# Patient Record
Sex: FEMALE | Race: BLACK OR AFRICAN AMERICAN | NOT HISPANIC OR LATINO | Employment: FULL TIME | URBAN - METROPOLITAN AREA
[De-identification: names, ages, dates, MRNs, and addresses within clinical notes are randomized per-mention and may not be internally consistent; named-entity substitution may affect disease eponyms.]

---

## 2017-01-05 ENCOUNTER — OFFICE VISIT (OUTPATIENT)
Dept: SURGERY | Facility: CLINIC | Age: 32
End: 2017-01-05
Payer: COMMERCIAL

## 2017-01-05 VITALS
BODY MASS INDEX: 24.44 KG/M2 | TEMPERATURE: 99 F | WEIGHT: 165 LBS | HEIGHT: 69 IN | HEART RATE: 93 BPM | DIASTOLIC BLOOD PRESSURE: 79 MMHG | SYSTOLIC BLOOD PRESSURE: 127 MMHG

## 2017-01-05 DIAGNOSIS — K80.20 GALLSTONES: ICD-10-CM

## 2017-01-05 PROCEDURE — 99203 OFFICE O/P NEW LOW 30 MIN: CPT | Mod: S$GLB,,, | Performed by: SURGERY

## 2017-01-05 PROCEDURE — 99999 PR PBB SHADOW E&M-EST. PATIENT-LVL III: CPT | Mod: PBBFAC,,, | Performed by: SURGERY

## 2017-01-05 PROCEDURE — 1159F MED LIST DOCD IN RCRD: CPT | Mod: S$GLB,,, | Performed by: SURGERY

## 2017-01-05 NOTE — LETTER
January 5, 2017        Donna Akhtar MD  [address]  [facility] [address]             [facility] - General Surgery  [address]  [place] [address]  Phone: [phone] January 5, 2017      Donna Akhtar MD  [address]  [facility] [address]    Patient: Gautam Sotomayor   MR Number: [id number]   YOB: 1985   Date of Visit: 1/5/2017     Dear Dr. Akhtar:    Thank you for referring Gautam Sotomayor to me for evaluation. Below are the relevant portions of my assessment and plan of care.    Patient presents with biliary colic.     PLAN:  I think she should have her gallbladder removed.  Likely this could be laparoscopic, depending on adhesions.  Open in the navel and possibly RUQ.    If you have questions, please do not hesitate to call me. I look forward to following Gautam along with you.    Sincerely,    Pasha Nuno MD   Section Head - General, Laparoscopic, Bariatric  Acute Care and Oncologic Surgery   - Surgical Weight Loss Program  [facility]    WSGEO/fer

## 2017-01-05 NOTE — PROGRESS NOTES
History & Physical    SUBJECTIVE:     History of Present Illness:  Patient is a 31 y.o. female presents with gallstones.  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  Starting in 2009 she developed ruq pains but these have worsened over the last 5 years.  The pain is sharp but brief, non-radiating, with no aggravating or relieving factors.  She is not sure if food changes the pain but movement doesn't.      Chief Complaint   Patient presents with    Gall Bladder Problem       Review of patient's allergies indicates:   Allergen Reactions    Nickel Rash       Current Outpatient Prescriptions   Medication Sig Dispense Refill    fluticasone (FLONASE) 50 mcg/actuation nasal spray 2 sprays by Each Nare route once daily. 1 Bottle 4    folic acid (FOLVITE) 400 MCG tablet Take 400 mcg by mouth once daily.       No current facility-administered medications for this visit.        Past Medical History   Diagnosis Date    Allergy     Anemia     Sickle cell anemia      Trait     Past Surgical History   Procedure Laterality Date    Appendectomy      Splenectomy, total      Foot surgery Left     [place] tooth extraction       Family History   Problem Relation Age of Onset    Breast cancer Neg Hx     Colon cancer Neg Hx     Ovarian cancer Neg Hx      Social History   Substance Use Topics    Smoking status: Current Every Day Smoker     Packs/day: 0.25     Years: 10.00    Smokeless tobacco: None    Alcohol use 0.6 oz/week     1 Cans of beer per week      Comment: socially        Review of Systems:  Review of Systems   Constitutional: Negative for fever and unexpected weight change.   Respiratory: Negative for cough, chest tightness and shortness of breath.    Cardiovascular: Negative for chest pain.   Gastrointestinal: Negative for constipation, diarrhea, nausea and vomiting.   Genitourinary: Positive for frequency. Negative for difficulty urinating and dysuria.   Skin: Negative for rash and wound.   Neurological:  "Negative for seizures and headaches.   Hematological:        Has bruising but no easy bleeding       OBJECTIVE:     Vital Signs (Most Recent)  Temp: 98.5 °F (36.9 °C) (01/05/17 1346)  Pulse: 93 (01/05/17 1346)  BP: 127/79 (01/05/17 1346)  5' 9" (1.753 m)  74.8 kg (165 lb)     Physical Exam:  Physical Exam   Constitutional: She is oriented to person, place, and time. She appears well-developed and well-nourished.   Neck: Normal range of motion. Neck supple.   Cardiovascular: Normal rate, regular rhythm and normal heart sounds.    Pulmonary/Chest: Effort normal and breath sounds normal.   Abdominal: Soft. Bowel sounds are normal. There is no tenderness.       Neurological: She is alert and oriented to person, place, and time.   Skin: Skin is warm and dry.   Psychiatric: She has a normal mood and affect. Her behavior is normal. Judgment and thought content normal.   Vitals reviewed.      Laboratory  CBC: Reviewed  CMP: Reviewed    Diagnostic Results:  US: Reviewed    ASSESSMENT/PLAN:     Biliary colic    PLAN:Plan     I think she should have her gallbladder removed.  Likely this could be laparoscopic, depending on adhesions.  Open in the navel and possibly ruq.       "

## 2017-01-09 ENCOUNTER — TELEPHONE (OUTPATIENT)
Dept: SURGERY | Facility: CLINIC | Age: 32
End: 2017-01-09

## 2017-01-09 ENCOUNTER — CLINICAL SUPPORT (OUTPATIENT)
Dept: AUDIOLOGY | Facility: CLINIC | Age: 32
End: 2017-01-09
Payer: COMMERCIAL

## 2017-01-09 ENCOUNTER — OFFICE VISIT (OUTPATIENT)
Dept: OTOLARYNGOLOGY | Facility: CLINIC | Age: 32
End: 2017-01-09
Payer: COMMERCIAL

## 2017-01-09 VITALS — WEIGHT: 168.88 LBS | BODY MASS INDEX: 25.01 KG/M2 | HEIGHT: 69 IN

## 2017-01-09 DIAGNOSIS — H69.90 ETD (EUSTACHIAN TUBE DYSFUNCTION), UNSPECIFIED LATERALITY: ICD-10-CM

## 2017-01-09 DIAGNOSIS — R42 VERTIGO: Primary | ICD-10-CM

## 2017-01-09 DIAGNOSIS — H60.63 CHRONIC NON-INFECTIVE OTITIS EXTERNA OF BOTH EARS, UNSPECIFIED TYPE: ICD-10-CM

## 2017-01-09 DIAGNOSIS — H81.09 VERTIGO, LABYRINTHINE, UNSPECIFIED LATERALITY: ICD-10-CM

## 2017-01-09 DIAGNOSIS — H81.319 AURAL VERTIGO, UNSPECIFIED LATERALITY: Primary | ICD-10-CM

## 2017-01-09 DIAGNOSIS — J30.1 SEASONAL ALLERGIC RHINITIS DUE TO POLLEN: Primary | ICD-10-CM

## 2017-01-09 PROCEDURE — 99205 OFFICE O/P NEW HI 60 MIN: CPT | Mod: S$PBB,,, | Performed by: OTOLARYNGOLOGY

## 2017-01-09 PROCEDURE — 92537 CALORIC VSTBLR TEST W/REC: CPT | Mod: 51,S$GLB,, | Performed by: AUDIOLOGIST-HEARING AID FITTER

## 2017-01-09 PROCEDURE — 92557 COMPREHENSIVE HEARING TEST: CPT | Mod: S$GLB,,, | Performed by: AUDIOLOGIST

## 2017-01-09 PROCEDURE — 92540 BASIC VESTIBULAR EVALUATION: CPT | Mod: S$GLB,,, | Performed by: AUDIOLOGIST-HEARING AID FITTER

## 2017-01-09 PROCEDURE — 92567 TYMPANOMETRY: CPT | Mod: 51,S$GLB,, | Performed by: AUDIOLOGIST

## 2017-01-09 PROCEDURE — 99999 PR PBB SHADOW E&M-EST. PATIENT-LVL III: CPT | Mod: PBBFAC,,, | Performed by: OTOLARYNGOLOGY

## 2017-01-09 RX ORDER — BETAMETHASONE VALERATE 0.1 %
LOTION (ML) TOPICAL 2 TIMES DAILY
Qty: 60 ML | Refills: 2 | Status: SHIPPED | OUTPATIENT
Start: 2017-01-09 | End: 2017-11-16

## 2017-01-09 NOTE — PROGRESS NOTES
VNG/Postuography Evaluation    Referring physician:  Dr. Mckeon    31 y.o. female complains of vertigo (one episode that lasted 3 days), dizziness and occasional imbalance.  Symptoms are provoked by airising from bed and have been recurring over the past year and a half.    Gaze nystagmus was absent.    Oculomotor function was normal.    Spontaneous nystagmus was absent.    The head-hanging left Hallpike was negative.    The head-hanging right Hallpike was negative.    Static positional nystagmus was absent.    Unilateral weakness: 3% (right)  Directional preponderance 13% (right beating)    RC: 33 d/s   d/s  RW: 85 d/s  LW: 73 d/s    Fixation suppression was normal.    Impression: Normal VNG; no evidence of vestibular system dysfunction including BPPV    Recommendations: Trial period with Cawthorne exercises to reduce subjective symptoms

## 2017-01-09 NOTE — PROGRESS NOTES
Otolaryngology - Head and Neck Surgery  Consultation Report      Chief Complaint: dizziness, otalgia, itchy ears AU    History of Present Illness: 31 y.o. female presents for evaluation of dizziness, otalgia, and itchy ears AU. She reports that over the last year or so she has noticed increasing flaking and crusting AU as well as otalgia, AD > AS for years. She reports pain with flying. She also notes dizziness which is an off-balance/lightheaded sensation without triggers. She says it comes and goes, may last for seconds to minutes, and resolves spontaneously. No associated hearing loss or ear fullness. Her last episode was several months ago. She denies hearing loss overall.     She does have a history of sickle cell trait. No migraines or headaches. No prior ear surgery. She has allergies and takes flonase and zyrtec daily -- she has been told she has ETD by her PCP previously.     Review of Systems - Negative except as mentioned in HPI.   History obtained from chart review and the patient  General ROS: negative  Psychological ROS: negative  Ophthalmic ROS: negative  ENT ROS: positive for - nasal congestion, nasal discharge, tinnitus and vertigo  negative for - hearing change  Allergy and Immunology ROS: positive for allergies  Hematological and Lymphatic ROS: negative  Respiratory ROS: no cough, shortness of breath, or wheezing  Cardiovascular ROS: no chest pain or dyspnea on exertion  Gastrointestinal ROS: no abdominal pain, change in bowel habits, or black or bloody stools  Genito-Urinary ROS: no dysuria, trouble voiding, or hematuria  Musculoskeletal ROS: negative  Neurological ROS: no TIA or stroke symptoms  Dermatological ROS: negative    Past Medical History: she  has a past medical history of Allergy; Anemia; and Sickle cell anemia.    Past Surgical History: she  has a past surgical history that includes Appendectomy; Splenectomy, total; Foot surgery (Left); and [place] tooth extraction.    Social History:  she  reports that she has been smoking.  She has a 2.50 pack-year smoking history. She does not have any smokeless tobacco history on file. She reports that she drinks about 0.6 oz of alcohol per week  She reports that she does not use illicit drugs.    Family History: family history is negative for Breast cancer, Colon cancer, and Ovarian cancer.    Medications:     Allergies: she is allergic to nickel.    Physical Exam:  [unfilled]    General: nad, alert, oriented  Head: ncat  Eyes: eomi, perrl  Ears:   AU: dry, flaking skin with mild OE AU, TM intact, no NANCY  Nose: septum straight, congested mucosa  Mouth: adequate dentition, no lesions  Neck: soft, supple  Pulmonary: normal effort  Cardiovascular: RRR        VNG:   Gaze nystagmus was absent.  Oculomotor function was normal.  Spontaneous nystagmus was absent.  The head-hanging left Hallpike was negative.  The head-hanging right Hallpike was negative.  Static positional nystagmus was absent.  Unilateral weakness: 3% (right)  Directional preponderance 13% (right beating)     RC: 33 d/s   d/s  RW: 85 d/s  LW: 73 d/s     Fixation suppression was normal.     Impression: Normal VNG; no evidence of vestibular system dysfunction including BPPV    Assessment: 31F with mild OE, ETD. Hearing normal. VNG normal.     Plan:   - Continue allergy medication  - Clobetasol cream bid  - RTC PRN    Gautam was seen today for dizziness, otalgia, otitis media and itchy ears.    Diagnoses and all orders for this visit:    Seasonal allergic rhinitis due to pollen  -     Ambulatory referral to Audiology    ETD (eustachian tube dysfunction), unspecified laterality  -     Ambulatory referral to Audiology    Chronic non-infective otitis externa of both ears, unspecified type  -     Ambulatory referral to Audiology    Vertigo, labyrinthine, unspecified laterality    Other orders  -     betamethasone valerate 0.1% (VALISONE) 0.1 % Lotn; Apply topically 2 (two) times daily. qtts 3 AU  Bid.

## 2017-01-09 NOTE — MR AVS SNAPSHOT
[facility] - Otorhinolaryngology  [address]  [facility] [address]  Phone: [phone]  Fax: [phone]                  Gautam Sotomayor   2017 9:30 AM   Office Visit    Description:  Female : 1985   Provider:  Tom Mckeon MD   Department:  [facility] - Otorhinolaryngology           Reason for Visit     Dizziness     Otalgia     Otitis Media     itchy ears           Diagnoses this Visit        Comments    Seasonal allergic rhinitis due to pollen    -  Primary     ETD (eustachian tube dysfunction), unspecified laterality         Chronic non-infective otitis externa of both ears, unspecified type         Vertigo, labyrinthine, unspecified laterality                To Do List           Future Appointments        Provider Department Dept Phone    2017 3:30 PM Angela Sullivan MD [facility] - OB/GYN [address] [phone]      Goals (5 Years of Data)     None      Follow-Up and Disposition     Return if symptoms worsen or fail to improve.       These Medications        Disp Refills Start End    betamethasone valerate 0.1% (VALISONE) 0.1 % Lotn 60 mL 2 2017    Apply topically 2 (two) times daily. qtts 3 AU Bid. - Topical    Pharmacy: [facility]/pharm[facility] #[id number] - [place], [place] - [address]  #: [phone]         [facility]     [facility] Care [facility] -  Assistance  Registered nurses in the [facility] On Call Center provide clinical advisement, health education, appointment booking, and other advisory services.  Call for this free service at [phone].             Medications           Message regarding Medications     Verify the changes and/or additions to your medication regime listed below are the same as discussed with your clinician today.  If any of these changes or additions are incorrect, please notify your healthcare provider.        START taking these NEW medications        Refills    betamethasone valerate 0.1%  "(VALISONE) 0.1 % Lotn 2    Sig: Apply topically 2 (two) times daily. qtts 3 AU Bid.    Class: Normal    Route: Topical           Verify that the below list of medications is an accurate representation of the medications you are currently taking.  If none reported, the list may be blank. If incorrect, please contact your healthcare provider. Carry this list with you in case of emergency.           Current Medications     betamethasone valerate 0.1% (VALISONE) 0.1 % Lotn Apply topically 2 (two) times daily. qtts 3 AU Bid.    BIOTIN ORAL Take by mouth.    CETIRIZINE HCL (ZYRTEC ORAL) Take by mouth.    fluticasone (FLONASE) 50 mcg/actuation nasal spray 2 sprays by Each Nare route once daily.    folic acid (FOLVITE) 400 MCG tablet Take 400 mcg by mouth once daily.           Clinical Reference Information           Vital Signs - Last Recorded  Most recent update: 1/9/2017  9:43 AM by Chelita Davis MA    Ht Wt BMI          5' 9" (1.753 m) 76.6 kg (168 lb 14 oz) 24.94 kg/m2        Allergies as of 1/9/2017     Nickel      Immunizations Administered on Date of Encounter - 1/9/2017     None      Orders Placed During Today's Visit      Normal Orders This Visit    Ambulatory referral to Audiology       Smoking Cessation     If you would like to quit smoking:   You may be eligible for free services if you are a [place] resident and started smoking cigarettes before September 1, 1988.  Call the Smoking Cessation Trust (SCT) toll free at ([phone] or [phone].   Call 2-800-QUIT-NOW if you do not meet the above criteria.            "

## 2017-01-12 ENCOUNTER — OFFICE VISIT (OUTPATIENT)
Dept: OBSTETRICS AND GYNECOLOGY | Facility: CLINIC | Age: 32
End: 2017-01-12
Attending: OBSTETRICS & GYNECOLOGY
Payer: COMMERCIAL

## 2017-01-12 VITALS
SYSTOLIC BLOOD PRESSURE: 100 MMHG | DIASTOLIC BLOOD PRESSURE: 60 MMHG | WEIGHT: 167.75 LBS | BODY MASS INDEX: 24.85 KG/M2 | HEIGHT: 69 IN

## 2017-01-12 DIAGNOSIS — N83.202 LEFT OVARIAN CYST: Primary | ICD-10-CM

## 2017-01-12 PROCEDURE — 99213 OFFICE O/P EST LOW 20 MIN: CPT | Mod: S$GLB,,, | Performed by: OBSTETRICS & GYNECOLOGY

## 2017-01-12 PROCEDURE — 99999 PR PBB SHADOW E&M-EST. PATIENT-LVL II: CPT | Mod: PBBFAC,,, | Performed by: OBSTETRICS & GYNECOLOGY

## 2017-01-12 RX ORDER — TERCONAZOLE 8 MG/G
1 CREAM VAGINAL NIGHTLY
Qty: 20 G | Refills: 0 | Status: SHIPPED | OUTPATIENT
Start: 2017-01-12 | End: 2017-01-19

## 2017-01-12 RX ORDER — FLUCONAZOLE 150 MG/1
150 TABLET ORAL ONCE
Qty: 1 TABLET | Refills: 0 | Status: SHIPPED | OUTPATIENT
Start: 2017-01-12 | End: 2017-01-12

## 2017-01-12 NOTE — MR AVS SNAPSHOT
[facility] - OB/GYN [address]  [address] [facility] [address]  Phone: [phone]                  Gautam Sotomayor   2017 3:30 PM   Office Visit    Description:  Female : 1985   Provider:  Angela Sullivan MD   Department:  [facility] - OB/GYN [address]                To Do List           Future Appointments        Provider Department Dept Phone    2017 3:30 PM Angela Sullivan MD [facility] - OB/GYN [address] [phone]    2017 3:00 PM Eloisa Gutierrez MD [facility] General Surgery [phone]      Goals (5 Years of Data)     None      [facility]     [facility] On Call Nurse Care Line -  Assistance  Registered nurses in the [facility] On Call Center provide clinical advisement, health education, appointment booking, and other advisory services.  Call for this free service at [phone].             Medications           Message regarding Medications     Verify the changes and/or additions to your medication regime listed below are the same as discussed with your clinician today.  If any of these changes or additions are incorrect, please notify your healthcare provider.             Verify that the below list of medications is an accurate representation of the medications you are currently taking.  If none reported, the list may be blank. If incorrect, please contact your healthcare provider. Carry this list with you in case of emergency.           Current Medications     betamethasone valerate 0.1% (VALISONE) 0.1 % Lotn Apply topically 2 (two) times daily. qtts 3 AU Bid.    BIOTIN ORAL Take by mouth.    CETIRIZINE HCL (ZYRTEC ORAL) Take by mouth.    fluticasone (FLONASE) 50 mcg/actuation nasal spray 2 sprays by Each Nare route once daily.    folic acid (FOLVITE) 400 MCG tablet Take 400 mcg by mouth once daily.           Clinical Reference Information           Vital Signs - Last Recorded  Most recent update: 2017  3:21 PM by Li Zamudio MA    BP Ht Wt  "LMP BMI    100/60 (BP Location: Right arm, Patient Position: Sitting, BP Method: Manual) 5' 9" (1.753 m) 76.1 kg (167 lb 12.3 oz) 12/13/2016 24.78 kg/m2      Blood Pressure          Most Recent Value    BP  100/60      Allergies as of 1/12/2017     Nickel      Immunizations Administered on Date of Encounter - 1/12/2017     None      Smoking Cessation     If you would like to quit smoking:   You may be eligible for free services if you are a [place] resident and started smoking cigarettes before September 1, 1988.  Call the Smoking Cessation Trust (SCT) toll free at (957) 620-7702 or (188) 615-3070.   Call 0-792-QUIT-NOW if you do not meet the above criteria.            "

## 2017-01-13 ENCOUNTER — TELEPHONE (OUTPATIENT)
Dept: VASCULAR SURGERY | Facility: CLINIC | Age: 32
End: 2017-01-13

## 2017-01-13 NOTE — TELEPHONE ENCOUNTER
----- Message from Collin Brantley sent at 1/13/2017  8:53 AM CST -----  Patient states that she needs to speak with nurse in ref to rescheduling her appt on 01/16 to an earlier time if available;pt states that she will keep the original time if an earlier time isn't available//please call back at [phone]//thank you

## 2017-01-16 ENCOUNTER — INITIAL CONSULT (OUTPATIENT)
Dept: SURGERY | Facility: CLINIC | Age: 32
End: 2017-01-16
Attending: SURGERY
Payer: COMMERCIAL

## 2017-01-16 VITALS
DIASTOLIC BLOOD PRESSURE: 67 MMHG | BODY MASS INDEX: 24.46 KG/M2 | TEMPERATURE: 99 F | WEIGHT: 165.13 LBS | SYSTOLIC BLOOD PRESSURE: 105 MMHG | HEART RATE: 84 BPM | HEIGHT: 69 IN

## 2017-01-16 DIAGNOSIS — E04.1 THYROID NODULE: Primary | ICD-10-CM

## 2017-01-16 PROCEDURE — 99999 PR PBB SHADOW E&M-EST. PATIENT-LVL III: CPT | Mod: PBBFAC,,, | Performed by: SURGERY

## 2017-01-16 PROCEDURE — 99214 OFFICE O/P EST MOD 30 MIN: CPT | Mod: S$GLB,,, | Performed by: SURGERY

## 2017-01-16 NOTE — LETTER
Endocrine/General Surgery  [address] [place] [address]  Phone: [phone]  Fax: [phone] January 16, 2017         Donna Akhtar MD  [address]  [facility] [address]    Patient: Gautam Sotomayor   YOB: 1985   Date of Visit: 1/16/2017     Dear Dr. Akhtar:    I saw Ms. Sotomayor today in clinic. Attached you will find a copy of my note.    As you know, she is a 31 y.o. female who presented with thyromegaly in the setting of a small solitary nodule. I was able to discuss the natural history of thyroid nodules and the indications for intervention. The current plan includes repeat US in approximately 18 months.  Perhaps she could have this completed with you.    If you have any questions or concerns, please don't hesitate to contact my office. Again, thank you kindly for this referral.    Sincerely,    Eloisa Gutierrez MD

## 2017-01-16 NOTE — MR AVS SNAPSHOT
[facility] - Endo Surgery  [address], [address]  [facility] [address]  Phone: [phone]  Fax: [phone]                  Gautam Sotomayor   2017 8:30 AM   Initial consult    Description:  Female : 1985   Provider:  Eloisa Gutierrez MD   Department:  [facility] - Endo Surgery           Reason for Visit     Consult                To Do List           Goals (5 Years of Data)     None      [facility]     [facility] On Call Nurse [facility] Line -  Assistance  Registered nurses in the [facility] On Call Center provide clinical advisement, health education, appointment booking, and other advisory services.  Call for this free service at [phone].             Medications           Message regarding Medications     Verify the changes and/or additions to your medication regime listed below are the same as discussed with your clinician today.  If any of these changes or additions are incorrect, please notify your healthcare provider.             Verify that the below list of medications is an accurate representation of the medications you are currently taking.  If none reported, the list may be blank. If incorrect, please contact your healthcare provider. Carry this list with you in case of emergency.           Current Medications     betamethasone valerate 0.1% (VALISONE) 0.1 % Lotn Apply topically 2 (two) times daily. qtts 3 AU Bid.    BIOTIN ORAL Take by mouth.    CETIRIZINE HCL (ZYRTEC ORAL) Take by mouth.    fluticasone (FLONASE) 50 mcg/actuation nasal spray 2 sprays by Each Nare route once daily.    folic acid (FOLVITE) 400 MCG tablet Take 400 mcg by mouth once daily.    terconazole (TERAZOL 3) 0.8 % vaginal cream Place 1 applicator vaginally every evening.           Clinical Reference Information           Vital Signs - Last Recorded  Most recent update: 2017  8:26 AM by Rex Mckenzie MA    BP Pulse Temp Ht Wt LMP    105/67 (BP Location: Right arm, Patient Position:  "Sitting, BP Method: Automatic) 84 98.7 °F (37.1 °C) 5' 9" (1.753 m) 74.9 kg (165 lb 2 oz) 12/13/2016    BMI                24.38 kg/m2          Blood Pressure          Most Recent Value    BP  105/67      Allergies as of 1/16/2017     Nickel      Immunizations Administered on Date of Encounter - 1/16/2017     None      Smoking Cessation     If you would like to quit smoking:   You may be eligible for free services if you are a [place] resident and started smoking cigarettes before September 1, 1988.  Call the Smoking Cessation Trust (SCT) toll free at ([phone] or (977) 470-8476.   Call 7-182-QUIT-NOW if you do not meet the above criteria.            "

## 2017-01-16 NOTE — PATIENT INSTRUCTIONS
"The patient was given our "Understanding Thyroid Disease" booklet and directed to the [employer] patient education portal as a resource.  "

## 2017-01-16 NOTE — PROGRESS NOTES
Consult Note  Endocrine Surgery    Visit Diagnosis: Thyroid nodule [E04.1]    SUBJECTIVE:     Gautam Sotomayor is a 31 y.o. female seen at the request of Self Referral today in the Endocrine Surgery Clinic for evaluation of thyroid nodule(s). Her history dates back to several months when on routine physical exam thyroid enlargement was palpated. Subsequent evaluation included neck ultrasound. Gautam Sotomayor complains of no thyroid type symptoms currently aside for possible cold intolerance. The patient denies fatigue, unexplained weight changes, difficulty with swallowing, difficulty with shortness of breath especially with postural changes, change in voice quality, palpable neck mass and growth of thyroid nodule over time. She does not have a history of head and neck radiation therapy. She does have a family history of thyroid disease (maternal side). She does not have a family history of endocrinopathies. She is not taking thyroid hormone supplementation. She has not undergone radioactive iodine treatment.      Review of patient's allergies indicates:   Allergen Reactions    Nickel Rash       Current Outpatient Prescriptions   Medication Sig Dispense Refill    betamethasone valerate 0.1% (VALISONE) 0.1 % Lotn Apply topically 2 (two) times daily. qtts 3 AU Bid. 60 mL 2    BIOTIN ORAL Take by mouth.      CETIRIZINE HCL (ZYRTEC ORAL) Take by mouth.      fluticasone (FLONASE) 50 mcg/actuation nasal spray 2 sprays by Each Nare route once daily. 1 Bottle 4    folic acid (FOLVITE) 400 MCG tablet Take 400 mcg by mouth once daily.      terconazole (TERAZOL 3) 0.8 % vaginal cream Place 1 applicator vaginally every evening. 20 g 0     No current facility-administered medications for this visit.        Past Medical History   Diagnosis Date    Allergy     Anemia     Sickle cell anemia      Trait     Past Surgical History   Procedure Laterality Date    Appendectomy      Splenectomy, total      Foot  "surgery Left     [place] tooth extraction       Social History   Substance Use Topics    Smoking status: Current Every Day Smoker     Packs/day: 0.25     Years: 10.00    Smokeless tobacco: None    Alcohol use 0.6 oz/week     1 Cans of beer per week      Comment: socially          Review of Systems:    Review of Systems   Constitutional: negative for chills, fatigue, fevers, malaise and night sweats  Eyes: negative for icterus and irritation  Respiratory: negative for cough and dyspnea on exertion  Cardiovascular: negative for chest pain and palpitations  Gastrointestinal: negative for constipation, diarrhea and dysphagia  Genitourinary:negative for dysuria, hematuria and nocturia  Integument/breast: negative for rash and skin color change  Hematologic/lymphatic: negative for bleeding and easy bruising  Neurological: negative for gait problems, headaches and seizures  Behavioral/Psych: negative for anxiety and depression   ENT ROS: negative  Endocrine ROS: negative for - hair pattern changes, malaise/lethargy, mood swings, palpitations or polydipsia/polyuria      OBJECTIVE:     Vital Signs:  Visit Vitals    /67 (BP Location: Right arm, Patient Position: Sitting, BP Method: Automatic)    Pulse 84    Temp 98.7 °F (37.1 °C)    Ht 5' 9" (1.753 m)    Wt 74.9 kg (165 lb 2 oz)    LMP 12/13/2016    BMI 24.38 kg/m2      Body mass index is 24.38 kg/(m^2).      Physical Exam:    General:  no distress, see vitals for BMI    Eyes:  conjunctivae/corneas clear   Neck: trachea midline and symmetric, no adenopathy    Thyroid:  thyroid enlarged and thyroid no palpable nodule   Lung: clear to auscultation bilaterally   Heart:  regular rate and rhythm   Abdomen: soft, non-tender; bowel sounds normal; no masses,  no organomegaly   Skin/Extremities: warm and well-perfused   Pulses: 2+ and symmetric   Neuro: normal without focal findings and mental status, speech normal, alert and oriented x3       Laboratory/Radiologic " Studies    Studies:  Date:       Results:         Ultrasound:  10/15/2016 Sonographic evaluation of the thyroid gland was performed.  The thyroid gland appears enlarged with the right lobe of the thyroid gland measuring 6.1 x 2.0 x 1.9 cm in size and the left lobe of the thyroid gland measuring 6.7 x 2.2 x 2.5 cm.  The isthmus measures 0.4 cm in thickness.  There is a small left lobe thyroid nodule present within the mid left lobe measuring 0.2 x 0.4 x 0.5 cm in size.  The remainder of the thyroid gland is homogeneous in echotexture.                   Component Value Date   TSH 1.046 10/17/2016   Sodium 142 10/17/2016   Potassium 4.3 10/17/2016   Chloride 110 10/17/2016   CO2 24 10/17/2016   Glucose 90 10/17/2016   BUN, Bld 9 10/17/2016   Creatinine 0.8 10/17/2016   Calcium 9.5 10/17/2016   Anion Gap 8 10/17/2016   eGFR if African American >60.0 10/17/2016   eGFR if non African American >60.0 10/17/2016       ASSESSMENT/PLAN:     Assessment    Ms. Sotomayor is a 31 y.o. female who presents with evidence of a Thyroid nodule [E04.1].    Plan    During this visit, lab and imaging results were reviewed with the patient. Thyroid anatomy and physiology were reviewed and she was given a copy of our patient education booklet, Understanding Thyroid Disease. The above testing and examination support the diagnosis of enlarged thyroid with associated small thyroid nodule.    The risks and benefits of my recommendations, as well as other treatment options were discussed with the patient today. Patient's questions were answered.    No surgical intervention is required at this time.  I recommend that the patient  have a repeat US in 18-24 months.

## 2017-01-19 NOTE — PROGRESS NOTES
SUBJECTIVE:   31 y.o. female   for follow up of ovarian cyst. Patient's last menstrual period was 2016..  See for WWE  and reported RLQ.  Requested pelvic ultrasound to assess for fibroids.  Had initial scan  and had subsequent follow up  for left ovarian cyst 2 cm.  Has regular periods and uses condoms for contraception.      Pelvic ultrasound  showed:  Findings: The uterus measures 6.2 x 2.6 x 4.7 cm. Uterine parenchyma is homogeneous without evidence for masses. The endometrial echo complex is normal in thickness and measures 8 mm.    The right ovary is normal in size and measures 3.3 x 1.3 x 1.9 cm. The left ovary is normal in size and measures 4.0 x 1.8 x 3.6 cm. Follicles are seen in both ovaries.  Left ovarian cyst is visualized measuring 2.8 x 1.6 x 2.2 cm.  Small suspected hemorrhagic cyst visualized measuring 1.5 x 1.4 x 1.8 cm with surrounding hypervascularity.  Arterial and venous flow are preserved bilaterally with resistive indices of 0.48 on the right and 0.65 on the left. No free fluid is seen.   Impression     Left ovarian cyst and suspected small hemorrhagic cyst.  If patient is symptomatic, consider repeat pelvic ultrasound in 6 weeks to ensure resolution.         Pelvic ultrasound  showed  Findings: The uterus measures 6.3 cm x 3.5 cm x 3.9 cm. No uterine masses. The endometrium measures approximately 1 cm. The right ovary measures 2.2 cm x 2 cm x 1.1 cm.  The left ovary measures 2.2 cm x 2.1 cm x 3.8 cm.  A complex cystic structure measuring approximately 2.4 cm x 2.5 cm x 1.9 cm is identified on the left ovary.  Normal ovarian arterial and venous flow.  No adnexal abnormalities identified.   Impression    Complex cystic structure on the left ovary.  Although this is likely physiologic, it would not be unreasonable to perform an additional followup in 8-12 weeks to document resolution.           Past Medical History   Diagnosis Date    Allergy      Anemia     Sickle cell anemia      Trait     Past Surgical History   Procedure Laterality Date    Appendectomy      Splenectomy, total      Foot surgery Left     [place] tooth extraction       Social History     Social History    Marital status: Single     Spouse name: N/A    Number of children: N/A    Years of education: N/A     Occupational History    Not on file.     Social History Main Topics    Smoking status: Current Every Day Smoker     Packs/day: 0.25     Years: 10.00    Smokeless tobacco: Not on file    Alcohol use 0.6 oz/week     1 Cans of beer per week      Comment: socially    Drug use: No    Sexual activity: Yes     Partners: Male     Birth control/ protection: None, Condom     Other Topics Concern    Not on file     Social History Narrative     Family History   Problem Relation Age of Onset    Breast cancer Neg Hx     Colon cancer Neg Hx     Ovarian cancer Neg Hx      OB History    Para Term  AB SAB TAB Ectopic Multiple Living   0 0 0 0 0 0 0 0 0 0               Current Outpatient Prescriptions   Medication Sig Dispense Refill    betamethasone valerate 0.1% (VALISONE) 0.1 % Lotn Apply topically 2 (two) times daily. qtts 3 AU Bid. 60 mL 2    BIOTIN ORAL Take by mouth.      CETIRIZINE HCL (ZYRTEC ORAL) Take by mouth.      fluticasone (FLONASE) 50 mcg/actuation nasal spray 2 sprays by Each Nare route once daily. 1 Bottle 4    folic acid (FOLVITE) 400 MCG tablet Take 400 mcg by mouth once daily.      terconazole (TERAZOL 3) 0.8 % vaginal cream Place 1 applicator vaginally every evening. 20 g 0     No current facility-administered medications for this visit.      Allergies: Nickel     ROS:  Constitutional: no weight loss, weight gain, fever, fatigue  Eyes:  No vision changes, glasses/contacts  ENT/Mouth: No ulcers, sinus problems, ears ringing, headache  Cardiovascular: No inability to lie flat, chest pain, exercise intolerance, swelling, heart  "palpitations  Respiratory: No wheezing, coughing blood, shortness of breath, or cough  Gastrointestinal: No diarrhea, bloody stool, nausea/vomiting, constipation, gas, hemorrhoids  Genitourinary: No blood in urine, painful urination, urgency of urination, frequency of urination, incomplete emptying, incontinence, abnormal bleeding, painful periods, heavy periods, vaginal discharge, vaginal odor, painful intercourse, sexual problems, bleeding after intercourse.  Musculoskeletal: No muscle weakness  Skin/Breast: No painful breasts, nipple discharge, masses, rash, ulcers  Neurological: No passing out, seizures, numbness, headache  Endocrine: No diabetes, hypothyroid, hyperthyroid, hot flashes, hair loss, abnormal hair growth, ance  Psychiatric: No depression, crying  Hematologic: No bruises, bleeding, swollen lymph nodes, anemia.      OBJECTIVE:   The patient appears well, alert, oriented x 3, in no distress.  Visit Vitals    /60 (BP Location: Right arm, Patient Position: Sitting, BP Method: Manual)    Ht 5' 9" (1.753 m)    Wt 76.1 kg (167 lb 12.3 oz)    LMP 12/13/2016    BMI 24.78 kg/m2       ASSESSMENT:   1. Left ovarian cyst  US Pelvis Comp with Transvag NON-OB (xpd       PLAN:   Orders Placed This Encounter    US Pelvis Comp with Transvag NON-OB (xpd    terconazole (TERAZOL 3) 0.8 % vaginal cream    fluconazole (DIFLUCAN) 150 MG Tab     Discussed pelvic ultrasound and 2 cm left ovarian cyst.  Has some occasional RLQ pain, but not persistent.  Discussed cyst characteristics and low suspicion for malignancy.  Option of surgery and risks thereof.  I recommend following in 8-12 weeks.  Sooner if pain bothersome.  Return to clinic 12 weeks after next ultrasound    At the end of the visit, patient reported taking ABX for a sinus infection.  Now has vaginal irritation.  Requests meds for a yeast infection.  "

## 2017-02-22 ENCOUNTER — PATIENT MESSAGE (OUTPATIENT)
Dept: INTERNAL MEDICINE | Facility: CLINIC | Age: 32
End: 2017-02-22

## 2017-03-10 ENCOUNTER — HOSPITAL ENCOUNTER (EMERGENCY)
Facility: HOSPITAL | Age: 32
Discharge: HOME | End: 2017-03-10
Attending: EMERGENCY MEDICINE | Admitting: EMERGENCY MEDICINE
Payer: COMMERCIAL

## 2017-03-10 VITALS
WEIGHT: 165 LBS | RESPIRATION RATE: 16 BRPM | SYSTOLIC BLOOD PRESSURE: 101 MMHG | OXYGEN SATURATION: 100 % | HEIGHT: 69 IN | BODY MASS INDEX: 24.44 KG/M2 | HEART RATE: 101 BPM | TEMPERATURE: 99 F | DIASTOLIC BLOOD PRESSURE: 53 MMHG

## 2017-03-10 DIAGNOSIS — D57.00 SICKLE CELL PAIN CRISIS: ICD-10-CM

## 2017-03-10 DIAGNOSIS — A08.4 VIRAL GASTROENTERITIS: Primary | ICD-10-CM

## 2017-03-10 DIAGNOSIS — E86.0 DEHYDRATION: ICD-10-CM

## 2017-03-10 LAB
ALBUMIN SERPL BCP-MCNC: 4.7 G/DL
ALP SERPL-CCNC: 79 U/L
ALT SERPL W/O P-5'-P-CCNC: 16 U/L
ANION GAP SERPL CALC-SCNC: 13 MMOL/L
ANISOCYTOSIS BLD QL SMEAR: SLIGHT
AST SERPL-CCNC: 21 U/L
B-HCG UR QL: NEGATIVE
BASOPHILS # BLD AUTO: 0.02 K/UL
BASOPHILS NFR BLD: 0.1 %
BILIRUB SERPL-MCNC: 1.8 MG/DL
BILIRUB UR QL STRIP: NEGATIVE
BUN SERPL-MCNC: 13 MG/DL
CALCIUM SERPL-MCNC: 10.3 MG/DL
CHLORIDE SERPL-SCNC: 108 MMOL/L
CLARITY UR REFRACT.AUTO: CLEAR
CO2 SERPL-SCNC: 24 MMOL/L
COLOR UR AUTO: YELLOW
CREAT SERPL-MCNC: 0.8 MG/DL
CTP QC/QA: YES
DIFFERENTIAL METHOD: ABNORMAL
EOSINOPHIL # BLD AUTO: 0 K/UL
EOSINOPHIL NFR BLD: 0 %
ERYTHROCYTE [DISTWIDTH] IN BLOOD BY AUTOMATED COUNT: 14.9 %
EST. GFR  (AFRICAN AMERICAN): >60 ML/MIN/1.73 M^2
EST. GFR  (NON AFRICAN AMERICAN): >60 ML/MIN/1.73 M^2
GLUCOSE SERPL-MCNC: 105 MG/DL
GLUCOSE UR QL STRIP: NEGATIVE
HCT VFR BLD AUTO: 37.1 %
HGB BLD-MCNC: 13.5 G/DL
HGB UR QL STRIP: NEGATIVE
KETONES UR QL STRIP: ABNORMAL
LEUKOCYTE ESTERASE UR QL STRIP: NEGATIVE
LIPASE SERPL-CCNC: 4 U/L
LYMPHOCYTES # BLD AUTO: 0.8 K/UL
LYMPHOCYTES NFR BLD: 3.1 %
MCH RBC QN AUTO: 29.3 PG
MCHC RBC AUTO-ENTMCNC: 36.4 %
MCV RBC AUTO: 81 FL
MONOCYTES # BLD AUTO: 1.9 K/UL
MONOCYTES NFR BLD: 7.4 %
NEUTROPHILS # BLD AUTO: 22.5 K/UL
NEUTROPHILS NFR BLD: 89.4 %
NITRITE UR QL STRIP: NEGATIVE
PH UR STRIP: 7 [PH] (ref 5–8)
PLATELET # BLD AUTO: 319 K/UL
PMV BLD AUTO: 10.8 FL
POIKILOCYTOSIS BLD QL SMEAR: SLIGHT
POTASSIUM SERPL-SCNC: 4 MMOL/L
PROT SERPL-MCNC: 8.7 G/DL
PROT UR QL STRIP: NEGATIVE
RBC # BLD AUTO: 4.61 M/UL
RETICS/RBC NFR AUTO: 3.1 %
SODIUM SERPL-SCNC: 145 MMOL/L
SP GR UR STRIP: 1.01 (ref 1–1.03)
TARGETS BLD QL SMEAR: ABNORMAL
URN SPEC COLLECT METH UR: ABNORMAL
UROBILINOGEN UR STRIP-ACNC: NEGATIVE EU/DL
WBC # BLD AUTO: 25.27 K/UL

## 2017-03-10 PROCEDURE — 63600175 PHARM REV CODE 636 W HCPCS: Performed by: EMERGENCY MEDICINE

## 2017-03-10 PROCEDURE — 99284 EMERGENCY DEPT VISIT MOD MDM: CPT | Mod: 25

## 2017-03-10 PROCEDURE — 96375 TX/PRO/DX INJ NEW DRUG ADDON: CPT

## 2017-03-10 PROCEDURE — 96374 THER/PROPH/DIAG INJ IV PUSH: CPT

## 2017-03-10 PROCEDURE — 99285 EMERGENCY DEPT VISIT HI MDM: CPT | Mod: ,,, | Performed by: EMERGENCY MEDICINE

## 2017-03-10 PROCEDURE — 83690 ASSAY OF LIPASE: CPT

## 2017-03-10 PROCEDURE — 81025 URINE PREGNANCY TEST: CPT | Performed by: EMERGENCY MEDICINE

## 2017-03-10 PROCEDURE — 85025 COMPLETE CBC W/AUTO DIFF WBC: CPT

## 2017-03-10 PROCEDURE — 85045 AUTOMATED RETICULOCYTE COUNT: CPT

## 2017-03-10 PROCEDURE — 25500020 PHARM REV CODE 255: Performed by: EMERGENCY MEDICINE

## 2017-03-10 PROCEDURE — C9113 INJ PANTOPRAZOLE SODIUM, VIA: HCPCS | Performed by: EMERGENCY MEDICINE

## 2017-03-10 PROCEDURE — 25000003 PHARM REV CODE 250: Performed by: EMERGENCY MEDICINE

## 2017-03-10 PROCEDURE — 63600175 PHARM REV CODE 636 W HCPCS: Performed by: PHYSICIAN ASSISTANT

## 2017-03-10 PROCEDURE — 96361 HYDRATE IV INFUSION ADD-ON: CPT

## 2017-03-10 PROCEDURE — 81003 URINALYSIS AUTO W/O SCOPE: CPT

## 2017-03-10 PROCEDURE — 80053 COMPREHEN METABOLIC PANEL: CPT

## 2017-03-10 RX ORDER — DICYCLOMINE HYDROCHLORIDE 20 MG/1
20 TABLET ORAL 2 TIMES DAILY
Qty: 20 TABLET | Refills: 0 | Status: SHIPPED | OUTPATIENT
Start: 2017-03-10 | End: 2017-04-09

## 2017-03-10 RX ORDER — ONDANSETRON 4 MG/1
4 TABLET, FILM COATED ORAL EVERY 8 HOURS PRN
Qty: 12 TABLET | Refills: 0 | Status: SHIPPED | OUTPATIENT
Start: 2017-03-10 | End: 2017-09-18

## 2017-03-10 RX ORDER — PANTOPRAZOLE SODIUM 40 MG/10ML
40 INJECTION, POWDER, LYOPHILIZED, FOR SOLUTION INTRAVENOUS
Status: COMPLETED | OUTPATIENT
Start: 2017-03-10 | End: 2017-03-10

## 2017-03-10 RX ORDER — METOCLOPRAMIDE HYDROCHLORIDE 5 MG/ML
10 INJECTION INTRAMUSCULAR; INTRAVENOUS
Status: COMPLETED | OUTPATIENT
Start: 2017-03-10 | End: 2017-03-10

## 2017-03-10 RX ORDER — MORPHINE SULFATE 2 MG/ML
6 INJECTION, SOLUTION INTRAMUSCULAR; INTRAVENOUS
Status: COMPLETED | OUTPATIENT
Start: 2017-03-10 | End: 2017-03-10

## 2017-03-10 RX ORDER — NAPROXEN 500 MG/1
500 TABLET ORAL 2 TIMES DAILY WITH MEALS
Qty: 14 TABLET | Refills: 0 | Status: SHIPPED | OUTPATIENT
Start: 2017-03-10 | End: 2017-03-17

## 2017-03-10 RX ORDER — KETOROLAC TROMETHAMINE 30 MG/ML
15 INJECTION, SOLUTION INTRAMUSCULAR; INTRAVENOUS
Status: COMPLETED | OUTPATIENT
Start: 2017-03-10 | End: 2017-03-10

## 2017-03-10 RX ADMIN — METOCLOPRAMIDE 10 MG: 5 INJECTION, SOLUTION INTRAMUSCULAR; INTRAVENOUS at 11:03

## 2017-03-10 RX ADMIN — SODIUM CHLORIDE, SODIUM LACTATE, POTASSIUM CHLORIDE, AND CALCIUM CHLORIDE 1000 ML: .6; .31; .03; .02 INJECTION, SOLUTION INTRAVENOUS at 11:03

## 2017-03-10 RX ADMIN — SODIUM CHLORIDE, SODIUM LACTATE, POTASSIUM CHLORIDE, AND CALCIUM CHLORIDE 1000 ML: .6; .31; .03; .02 INJECTION, SOLUTION INTRAVENOUS at 01:03

## 2017-03-10 RX ADMIN — IOHEXOL 75 ML: 350 INJECTION, SOLUTION INTRAVENOUS at 12:03

## 2017-03-10 RX ADMIN — KETOROLAC TROMETHAMINE 15 MG: 30 INJECTION, SOLUTION INTRAMUSCULAR at 11:03

## 2017-03-10 RX ADMIN — PANTOPRAZOLE SODIUM 40 MG: 40 INJECTION, POWDER, FOR SOLUTION INTRAVENOUS at 11:03

## 2017-03-10 RX ADMIN — MORPHINE SULFATE 6 MG: 2 INJECTION, SOLUTION INTRAMUSCULAR; INTRAVENOUS at 11:03

## 2017-03-10 NOTE — ED PROVIDER NOTES
"Encounter Date: 3/10/2017       History     Chief Complaint   Patient presents with    Nausea     N/V/D. denies fever.      Review of patient's allergies indicates:   Allergen Reactions    Nickel Rash     HPI Comments: 31-year-old female with history of sickle cell trait, presents to the ER with chief complaint of bilateral leg pain, nausea, vomiting and diarrhea.  She reports vomiting and diarrhea every hour since 5 pm yesterday.  She describes loose brown stool and denies melena or bloody stool.  She reports left upper and left lower quadrant abdominal pain described as "turning and tightness" with intermittent cramping pain.  Her pain was rated 6/10 this morning but has improved since receiving medications in the ER.  She reports bilateral leg pain involving bilateral hips to her ankles, which began 5 hours prior to arrival.  She denies recent fall, trauma, or heavy lifting.  Patient has sickle cell trait and reports history of sickle cell pain crisis 5 years ago with similar joint pain.  She denies chest pain, shortness of breath, productive cough, dysuria, vaginal discharge, dizziness, near syncope.  The patient had cold symptoms one and a half weeks ago including nasal congestion, cough and body aches, but this has resolved.  She has not taken anything for her pain today.  She denies additional complaints at this time.      Past Medical History:   Diagnosis Date    Allergy     Anemia     Sickle cell anemia     Trait     Past Surgical History:   Procedure Laterality Date    APPENDECTOMY      FOOT SURGERY Left     SPLENECTOMY, TOTAL      WISDOM TOOTH EXTRACTION       Family History   Problem Relation Age of Onset    Breast cancer Neg Hx     Colon cancer Neg Hx     Ovarian cancer Neg Hx      Social History   Substance Use Topics    Smoking status: Current Every Day Smoker     Packs/day: 0.25     Years: 10.00    Smokeless tobacco: None    Alcohol use 0.6 oz/week     1 Cans of beer per week      " Comment: socially     Review of Systems   Constitutional: Negative for chills and fever.   HENT: Negative for sore throat.    Respiratory: Negative for shortness of breath.    Cardiovascular: Negative for chest pain.   Gastrointestinal: Positive for abdominal pain, diarrhea, nausea and vomiting.   Genitourinary: Negative for dysuria.   Musculoskeletal: Negative for back pain.   Skin: Negative for rash.   Neurological: Negative for dizziness, syncope, weakness and headaches.   Hematological: Does not bruise/bleed easily.   Psychiatric/Behavioral: Negative for confusion.       Physical Exam   Initial Vitals   BP Pulse Resp Temp SpO2   03/10/17 0904 03/10/17 0904 03/10/17 0904 03/10/17 0904 03/10/17 0904   113/55 116 16 98.9 °F (37.2 °C) 98 %     Physical Exam    Constitutional: She appears well-developed and well-nourished.   HENT:   Head: Atraumatic.   Eyes: Conjunctivae and EOM are normal. Pupils are equal, round, and reactive to light.   Neck: Normal range of motion. Neck supple.   Cardiovascular: Normal rate and regular rhythm.   Pulmonary/Chest: Breath sounds normal. No respiratory distress. She has no wheezes. She has no rhonchi. She has no rales.   Abdominal: Soft. Bowel sounds are normal. She exhibits no distension. There is no tenderness. There is no rebound and no guarding.   Neurological: She is alert and oriented to person, place, and time.   Skin: No rash noted.   Psychiatric: She has a normal mood and affect.         ED Course   Procedures  Labs Reviewed   CBC W/ AUTO DIFFERENTIAL - Abnormal; Notable for the following:        Result Value    WBC 25.27 (*)     MCV 81 (*)     MCHC 36.4 (*)     RDW 14.9 (*)     Gran # 22.5 (*)     Lymph # 0.8 (*)     Mono # 1.9 (*)     Gran% 89.4 (*)     Lymph% 3.1 (*)     All other components within normal limits   URINALYSIS - Abnormal; Notable for the following:     Ketones, UA 1+ (*)     All other components within normal limits    Narrative:     1 cup of urine    COMPREHENSIVE METABOLIC PANEL - Abnormal; Notable for the following:     Total Protein 8.7 (*)     Total Bilirubin 1.8 (*)     All other components within normal limits   RETICULOCYTES - Abnormal; Notable for the following:     Retic 3.1 (*)     All other components within normal limits   LIPASE   LIPASE   POCT URINE PREGNANCY                   APC / Resident Notes:   Patient presents for evaluation of nausea, vomiting and diarrhea ×2 days.  She reports bilateral hip, knee and ankle pain beginning today.    Patient has sickle cell trait, but does have history of sickle cell pain crisis.  Labs, antiemetics and pain medications were initially started in the intake area.  By the time I evaluated the patient she had received Toradol, Reglan, and fluids.  Her nausea and abdominal pain had improved but she reported continued severe bilateral leg pain.  She has no joint swelling, redness or recent fever and I do not suspect septic joint.  She is ambulating without difficulty.    DDX: Sickle cell pain crisis, viral gastroenteritis, diverticulitis, colitis, UTI, pyelonephritis, dehydration, electrolyte abnormalities.    Patient has elevated white blood cell count and reticulocyte count.  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  We will order CT of the abdomen for further evaluation of abdominal pain.      UA is negative for evidence of infection.  Chemistry shows no significant electrolyte abnormalities, or signs of severe dehydration.    CT is negative for acute process.      Her pain is significantly improved with morphine given in the ER.  She is given 2 liters IV fluids and is tolerating PO in the ER.  The patient is stable for discharge.  I will discharge home with prescription for Zofran and Bentyl to take as needed for symptomatic treatment of suspected viral gastroenteritis.  Her evaluation is also consistent with sickle cell pain crisis.  I'll instruct on close  follow-up with her PCP and hematologist within one week.  I'll prescribe NSAIDs as needed for pain control.  The patient is comfortable with this plan.    I discussed the care of this patient with my supervising MD.                ED Course     Clinical Impression:   The primary encounter diagnosis was Viral gastroenteritis. Diagnoses of Sickle cell pain crisis and Dehydration were also pertinent to this visit.          AYAN Kaye  03/10/17 [id number]

## 2017-03-10 NOTE — DISCHARGE INSTRUCTIONS
Return to the ER for any change or worsening of symptoms, including those listed below.        Viral Gastroenteritis (Adult)    Gastroenteritis is commonly called the stomach flu. It is most often caused by a virus that affects the stomach and intestinal tract and usually lasts from 2 to 7 days. Common viruses causing gastroenteritis include norovirus, rotavirus, and hepatitis A. Non-viral causes of gastroenteritis include bacteria, parasites, and toxins.  The danger from repeated vomiting or diarrhea is dehydration. This is the loss of too much fluid from the body. When this occurs, body fluids must be replaced. Antibiotics do not help with this illness because it is usually viral.Simple home treatment will be helpful.  Symptoms of viral gastroenteritis may include:  · Watery, loose stools  · Stomach pain or abdominal cramps  · Fever and chills  · Nausea and vomiting  · Loss of bowel control  · Headache  Home care  Gastroenteritis is transmitted by contact with the stool or vomit of an infected person. This can occur from person to person or from contact with a contaminated surface.  Follow these guidelines when caring for yourself at home:  · If symptoms are severe, rest at home for the next 24 hours or until you are feeling better.  · Wash your hands with soap and water or use alcohol-based  to prevent the spread of infection. Wash your hands after touching anyone who is sick.  · Wash your hands or use alcohol-based  after using the toilet and before meals. Clean the toilet after each use.  Remember these tips when preparing food:  · People with diarrhea should not prepare or serve food to others. When preparing foods, wash your hands before and after.  · Wash your hands after using cutting boards, countertops, knives, or utensils that have been in contact with raw food.  · Keep uncooked meats away from cooked and ready-to-eat foods.  Medicine  You may use acetaminophen or NSAID medicines like  ibuprofen or naproxen to control fever unless another medicine was given. If you have chronic liver or kidney disease, talk with your healthcare provider before using these medicines. Also talk with your provider if you've had a stomach ulcer or gastrointestinal bleeding. Don't give aspirin to anyone under 18 years of age who is ill with a fever. It may cause severe liver damage. Don't use NSAIDS is you are already taking one for another condition (like arthritis) or are on aspirin (such as for heart disease or after a stroke).  If medicine for vomiting or diarrhea are prescribed, take these only as directed. Do not take over-the-counter medicines for vomiting or diarrhea unless instructed by your healthcare provider.  Diet  Follow these guidelines for food:  · Water and liquids are important so you don't get dehydrated. Drink a small amount at a time or suck on ice chips if you are vomiting.  · If you eat, avoid fatty, greasy, spicy, or fried foods.  · Don't eat dairy if you have diarrhea. This can make diarrhea worse.  · Avoid tobacco, alcohol, and caffeine which may worsen symptoms.  During the first 24 hours (the first full day), follow the diet below:  · Beverages. Sports drinks, soft drinks without caffeine, ginger ale, mineral water (plain or flavored), decaffeinated tea and coffee. If you are very dehydrated, sports drinks aren't a good choice. They have too much sugar and not enough electrolytes. In this case, commercially available products called oral rehydration solutions, are best.  · Soups. Eat clear broth, consommé, and bouillon.  · Desserts. Eat gelatin, popsicles, and fruit juice bars.  During the next 24 hours (the second day), you may add the following to the above:  · Hot cereal, plain toast, bread, rolls, and crackers  · Plain noodles, rice, mashed potatoes, chicken noodle or rice soup  · Unsweetened canned fruit (avoid pineapple), bananas  · Limit fat intake to less than 15 grams per day. Do  this by avoiding margarine, butter, oils, mayonnaise, sauces, gravies, fried foods, peanut butter, meat, poultry, and fish.  · Limit fiber and avoid raw or cooked vegetables, fresh fruits (except bananas), and bran cereals.  · Limit caffeine and chocolate. Don't use spices or seasonings other than salt.  · Limit dairy products.  · Avoid alcohol.  During the next 24 hours:  · Gradually resume a normal diet as you feel better and your symptoms improve.  · If at any time it starts getting worse again, go back to clear liquids until you feel better.  Follow-up care  Follow up with your healthcare provider, or as advised. Call your provider if you don't get better within 24 hours or if diarrhea lasts more than a week. Also follow up if you are unable to keep down liquids and get dehydrated. If a stool (diarrhea) sample was taken, call as directed for the results.  Call 911  Call 911 if any of these occur:  · Trouble breathing  · Chest pain  · Confused  · Severe drowsiness or trouble awakening  · Fainting or loss of consciousness  · Rapid heart rate  · Seizure  · Stiff neck  When to seek medical advice  Call your healthcare provider right away if any of these occur:  · Abdominal pain that gets worse  · Continued vomiting (unable to keep liquids down)  · Frequent diarrhea (more than 5 times a day)  · Blood in vomit or stool (black or red color)  · Dark urine, reduced urine output, or extreme thirst  · Weakness or dizziness  · Drowsiness  · Fever of 100.4°F (38°C) oral or higher that does not get better with fever medicine  · New rash  Date Last Reviewed: 1/3/2016  © 0261-2555 [employer]. [address] [place] [address]. All rights reserved. This information is not intended as a substitute for professional medical care. Always follow your healthcare professional's instructions.      Sickle Cell Pain Crisis  Sickle cell anemia is an inherited disease that affects your red blood cells. It occurs most  often in people of  descent. The most common symptom of sickle cell anemia is a pain crisis. This occurs when the red blood cells change shape and block blood flow in the smaller blood vessels. Pain crisis can affect the bones, joints, chest, or abdomen. This may happen if you:  · Are dehydrated  · Have an infection  · Drink too much alcohol  · Are stressed  · Are exhausted  Having too little oxygen in your blood may also trigger a pain crisis.  Home care  Follow these guidelines when caring for yourself at home:  · Drink at least 3 quarts of fluid (12 8-ounce glasses) over the next 24 hours. This will make sure you are not dehydrated.  · Rest until all your pain is gone.  · Apply heat to the painful areas.  · Take any prescribed pain medicines as directed. You may take acetaminophen or ibuprofen instead for milder pain. If you have chronic liver or kidney disease, talk with your healthcare provider before using these medicines. Also talk with your provider if youve had a stomach ulcer or GI bleeding.  Preventing future attacks  · Keep yourself well-hydrated. Drink at least 8 glasses of water and other fluids every day. Drink more when you are sick with a fever, driving at high altitudes, or traveling by air.  · Avoid overexerting yourself, exposure to cold temperatures, and prolonged strenuous work. These may make you overly tired or dehydrated.  · Use oxygen during air travel. Contact the airline to make these arrangements.  · Limit how much alcohol you drink. An occasional drink may be OK when you dont have symptoms.  · Dont allow smoking in your home.  Follow-up care  Follow up with your healthcare provider in 1 week, or as advised.  When to seek medical advice  Call your healthcare provider right away if any of these occur:  · Pain that doesnt get better after taking the medicines prescribed  · Fever of 100.4°F (38ºC) or higher, or as directed by your healthcare provider  · Cough with dark sputum or  shortness of breath  · Blood (pink, brown, or red) in your urine  · Difficulty with speech or vision  · Any painful joint that becomes hot, swollen, or red  · Headache that is different from normal  · Not being able to move, or having part of your body become numb  · Sudden weakness or numbness  · Painful erection that does not go away  Date Last Reviewed: 2/28/2016  © 5004-5839 [employer]. [address] [place] [address], [place] [address]. All rights reserved. This information is not intended as a substitute for professional medical care. Always follow your healthcare professional's instructions.

## 2017-03-10 NOTE — ED NOTES
Bed: [facility] 01  Expected date:   Expected time:   Means of arrival:   Comments:

## 2017-03-10 NOTE — PROVIDER PROGRESS NOTES - EMERGENCY DEPT.
"Encounter Date: 3/10/2017    ED Physician Progress Notes       SCRIBE NOTE: I, Anne Moser, am scribing for, and in the presence of,  Dr. Mata.  Physician Statement: I, Dr. Mata, personally performed the services described in this documentation as scribed by Anne Moser in my presence, and it is both accurate and complete.     Physician Note:   I initially evaluated this patient and ordered workup while in intake.  The patient will receive a full evaluation in an ED pod when space is available.  All results from tests ordered in intake will not be followed by the intake team, including myself. All results will be followed by the ED Pod team.     Time seen by provider: 10:28 AM    This is a 31 y.o. female with the sickle cell trait and history of sickle cell crisis who presents with complaint of vomiting, inability to eat, severe abdominal pain which she describes as "turning" but not cramp-like, and several bilateral lower extremity pain.  The patient also indicates slight bilateral upper extremity pain and CP.  She denies associated fever and SOB. The patient indicates that she received the flu vaccination.       "

## 2017-03-10 NOTE — ED AVS SNAPSHOT
[facility]  [address]  [place] [address]               Gautam Sotomayor   3/10/2017  9:49 AM   ED    Description:  Female : 1985   Department:  [facility]           Your Care was Coordinated By:     Provider Role From To    Mata Mata MD Attending Provider 03/10/17 1010 03/10/17 1109    Joe Hsu MD Attending Provider 03/10/17 1109 --    AYAN Kaye Physician Assistant 03/10/17 110 --      Reason for Visit     Nausea           ED Disposition     None           To Do List            These Medications        Disp Refills Start End    dicyclomine (BENTYL) 20 mg tablet 20 tablet 0 3/10/2017 2017    Take 1 tablet (20 mg total) by mouth 2 (two) times daily. - Oral    Pharmacy: [facility]/pharmacy #[id number] [facility] [place] - [address] Ph #: [phone]       ondansetron (ZOFRAN) 4 MG tablet 12 tablet 0 3/10/2017     Take 1 tablet (4 mg total) by mouth every 8 (eight) hours as needed. - Oral    Pharmacy: [facility]/pharmacy #[facility] [place] - [address] Ph #: [phone]       naproxen (NAPROSYN) 500 MG tablet 14 tablet 0 3/10/2017 3/17/2017    Take 1 tablet (500 mg total) by mouth 2 (two) times daily with meals. - Oral    Pharmacy: [facility]/pharmacy #[facility] [place] - [address] Ph #: [phone]         [facility]     [facility] -  Assistance  Registered nurses in the [facility] provide clinical advisement, health education, appointment booking, and other advisory services.  Call for this free service at [phone].             Medications           Message regarding Medications     Verify the changes and/or additions to your medication regime listed below are the same as discussed with your clinician today.  If any of these changes or additions are incorrect, please notify your healthcare provider.        START taking these NEW  medications        Refills    dicyclomine (BENTYL) 20 mg tablet 0    Sig: Take 1 tablet (20 mg total) by mouth 2 (two) times daily.    Class: Print    Route: Oral    ondansetron (ZOFRAN) 4 MG tablet 0    Sig: Take 1 tablet (4 mg total) by mouth every 8 (eight) hours as needed.    Class: Print    Route: Oral    naproxen (NAPROSYN) 500 MG tablet 0    Sig: Take 1 tablet (500 mg total) by mouth 2 (two) times daily with meals.    Class: Print    Route: Oral      These medications were administered today        Dose Freq    lactated ringers bolus 1,000 mL 1,000 mL ED 1 Time    Sig: Inject 1,000 mLs into the vein ED 1 Time.    Class: Normal    Route: Intravenous    lactated ringers bolus 1,000 mL 1,000 mL ED 1 Time    Sig: Inject 1,000 mLs into the vein ED 1 Time.    Class: Normal    Route: Intravenous    ketorolac injection 15 mg 15 mg ED 1 Time    Sig: Inject 15 mg into the vein ED 1 Time.    Class: Normal    Route: Intravenous    pantoprazole injection 40 mg 40 mg ED 1 Time    Sig: Inject 40 mg into the vein ED 1 Time.    Class: Normal    Route: Intravenous    metoclopramide HCl injection 10 mg 10 mg ED 1 Time    Sig: Inject 2 mLs (10 mg total) into the vein ED 1 Time.    Class: Normal    Route: Intravenous    morphine injection 6 mg 6 mg ED 1 Time    Sig: Inject 3 mLs (6 mg total) into the vein ED 1 Time.    Class: Normal    Route: Intravenous    Cosign for Ordering: Required by Joe Hsu MD    omnipaque 350 iohexol 75 mL 75 mL IMG once as needed    Sig: Inject 75 mLs into the vein ONCE PRN for contrast.    Class: Normal    Route: Intravenous           Verify that the below list of medications is an accurate representation of the medications you are currently taking.  If none reported, the list may be blank. If incorrect, please contact your healthcare provider. Carry this list with you in case of emergency.           Current Medications     BIOTIN ORAL Take by mouth.    CETIRIZINE HCL (ZYRTEC ORAL) Take by  "mouth.    fluticasone (FLONASE) 50 mcg/actuation nasal spray 2 sprays by Each Nare route once daily.    betamethasone valerate 0.1% (VALISONE) 0.1 % Lotn Apply topically 2 (two) times daily. qtts 3 AU Bid.    dicyclomine (BENTYL) 20 mg tablet Take 1 tablet (20 mg total) by mouth 2 (two) times daily.    folic acid (FOLVITE) 400 MCG tablet Take 400 mcg by mouth once daily.    lactated ringers bolus 1,000 mL Inject 1,000 mLs into the vein ED 1 Time.    naproxen (NAPROSYN) 500 MG tablet Take 1 tablet (500 mg total) by mouth 2 (two) times daily with meals.    ondansetron (ZOFRAN) 4 MG tablet Take 1 tablet (4 mg total) by mouth every 8 (eight) hours as needed.           Clinical Reference Information           Your Vitals Were     BP Pulse Temp Resp Height Weight    101/53 (BP Location: Left arm, Patient Position: Sitting, BP Method: Automatic) 101 99.2 °F (37.3 °C) (Oral) 16 5' 9" (1.753 m) 74.8 kg (165 lb)    SpO2 BMI             100% 24.37 kg/m2         Allergies as of 3/10/2017        Reactions    Nickel Rash      Immunizations Administered on Date of Encounter - 3/10/2017     None      ED Micro, Lab, POCT     Start Ordered       Status Ordering Provider    03/10/17 1203 03/10/17 1202  Lipase  Add-on      Completed     03/10/17 1105 03/10/17 1104  POCT urine pregnancy  Once      Final result     03/10/17 1036 03/10/17 1036  Lipase  Once      In process     03/10/17 1034 03/10/17 1036  CBC auto differential  STAT      Final result     03/10/17 1034 03/10/17 1036  Urinalysis  STAT      Final result     03/10/17 1034 03/10/17 1036  Comprehensive metabolic panel  STAT      Final result     03/10/17 1034 03/10/17 1036  Reticulocytes  Once      Final result       ED Imaging Orders     Start Ordered       Status Ordering Provider    03/10/17 1141 03/10/17 1141  CT Abdomen Pelvis With Contrast  1 time imaging      Final result         Discharge Instructions       Return to the ER for any change or worsening of symptoms, " including those listed below.        Viral Gastroenteritis (Adult)    Gastroenteritis is commonly called the stomach flu. It is most often caused by a virus that affects the stomach and intestinal tract and usually lasts from 2 to 7 days. Common viruses causing gastroenteritis include norovirus, rotavirus, and hepatitis A. Non-viral causes of gastroenteritis include bacteria, parasites, and toxins.  The danger from repeated vomiting or diarrhea is dehydration. This is the loss of too much fluid from the body. When this occurs, body fluids must be replaced. Antibiotics do not help with this illness because it is usually viral.Simple home treatment will be helpful.  Symptoms of viral gastroenteritis may include:  · Watery, loose stools  · Stomach pain or abdominal cramps  · Fever and chills  · Nausea and vomiting  · Loss of bowel control  · Headache  Home care  Gastroenteritis is transmitted by contact with the stool or vomit of an infected person. This can occur from person to person or from contact with a contaminated surface.  Follow these guidelines when caring for yourself at home:  · If symptoms are severe, rest at home for the next 24 hours or until you are feeling better.  · Wash your hands with soap and water or use alcohol-based  to prevent the spread of infection. Wash your hands after touching anyone who is sick.  · Wash your hands or use alcohol-based  after using the toilet and before meals. Clean the toilet after each use.  Remember these tips when preparing food:  · People with diarrhea should not prepare or serve food to others. When preparing foods, wash your hands before and after.  · Wash your hands after using cutting boards, countertops, knives, or utensils that have been in contact with raw food.  · Keep uncooked meats away from cooked and ready-to-eat foods.  Medicine  You may use acetaminophen or NSAID medicines like ibuprofen or naproxen to control fever unless another  medicine was given. If you have chronic liver or kidney disease, talk with your healthcare provider before using these medicines. Also talk with your provider if you've had a stomach ulcer or gastrointestinal bleeding. Don't give aspirin to anyone under 18 years of age who is ill with a fever. It may cause severe liver damage. Don't use NSAIDS is you are already taking one for another condition (like arthritis) or are on aspirin (such as for heart disease or after a stroke).  If medicine for vomiting or diarrhea are prescribed, take these only as directed. Do not take over-the-counter medicines for vomiting or diarrhea unless instructed by your healthcare provider.  Diet  Follow these guidelines for food:  · Water and liquids are important so you don't get dehydrated. Drink a small amount at a time or suck on ice chips if you are vomiting.  · If you eat, avoid fatty, greasy, spicy, or fried foods.  · Don't eat dairy if you have diarrhea. This can make diarrhea worse.  · Avoid tobacco, alcohol, and caffeine which may worsen symptoms.  During the first 24 hours (the first full day), follow the diet below:  · Beverages. Sports drinks, soft drinks without caffeine, ginger ale, mineral water (plain or flavored), decaffeinated tea and coffee. If you are very dehydrated, sports drinks aren't a good choice. They have too much sugar and not enough electrolytes. In this case, commercially available products called oral rehydration solutions, are best.  · Soups. Eat clear broth, consommé, and bouillon.  · Desserts. Eat gelatin, popsicles, and fruit juice bars.  During the next 24 hours (the second day), you may add the following to the above:  · Hot cereal, plain toast, bread, rolls, and crackers  · Plain noodles, rice, mashed potatoes, chicken noodle or rice soup  · Unsweetened canned fruit (avoid pineapple), bananas  · Limit fat intake to less than 15 grams per day. Do this by avoiding margarine, butter, oils, mayonnaise,  sauces, gravies, fried foods, peanut butter, meat, poultry, and fish.  · Limit fiber and avoid raw or cooked vegetables, fresh fruits (except bananas), and bran cereals.  · Limit caffeine and chocolate. Don't use spices or seasonings other than salt.  · Limit dairy products.  · Avoid alcohol.  During the next 24 hours:  · Gradually resume a normal diet as you feel better and your symptoms improve.  · If at any time it starts getting worse again, go back to clear liquids until you feel better.  Follow-up care  Follow up with your healthcare provider, or as advised. Call your provider if you don't get better within 24 hours or if diarrhea lasts more than a week. Also follow up if you are unable to keep down liquids and get dehydrated. If a stool (diarrhea) sample was taken, call as directed for the results.  Call 911  Call 911 if any of these occur:  · Trouble breathing  · Chest pain  · Confused  · Severe drowsiness or trouble awakening  · Fainting or loss of consciousness  · Rapid heart rate  · Seizure  · Stiff neck  When to seek medical advice  Call your healthcare provider right away if any of these occur:  · Abdominal pain that gets worse  · Continued vomiting (unable to keep liquids down)  · Frequent diarrhea (more than 5 times a day)  · Blood in vomit or stool (black or red color)  · Dark urine, reduced urine output, or extreme thirst  · Weakness or dizziness  · Drowsiness  · Fever of 100.4°F (38°C) oral or higher that does not get better with fever medicine  · New rash  Date Last Reviewed: 1/3/2016  © 4195-8302 [employer]. [address] [place] [address], [place] [address]. All rights reserved. This information is not intended as a substitute for professional medical care. Always follow your healthcare professional's instructions.      Sickle Cell Pain Crisis  Sickle cell anemia is an inherited disease that affects your red blood cells. It occurs most often in people of  descent. The most common  symptom of sickle cell anemia is a pain crisis. This occurs when the red blood cells change shape and block blood flow in the smaller blood vessels. Pain crisis can affect the bones, joints, chest, or abdomen. This may happen if you:  · Are dehydrated  · Have an infection  · Drink too much alcohol  · Are stressed  · Are exhausted  Having too little oxygen in your blood may also trigger a pain crisis.  Home care  Follow these guidelines when caring for yourself at home:  · Drink at least 3 quarts of fluid (12 8-ounce glasses) over the next 24 hours. This will make sure you are not dehydrated.  · Rest until all your pain is gone.  · Apply heat to the painful areas.  · Take any prescribed pain medicines as directed. You may take acetaminophen or ibuprofen instead for milder pain. If you have chronic liver or kidney disease, talk with your healthcare provider before using these medicines. Also talk with your provider if youve had a stomach ulcer or GI bleeding.  Preventing future attacks  · Keep yourself well-hydrated. Drink at least 8 glasses of water and other fluids every day. Drink more when you are sick with a fever, driving at high altitudes, or traveling by air.  · Avoid overexerting yourself, exposure to cold temperatures, and prolonged strenuous work. These may make you overly tired or dehydrated.  · Use oxygen during air travel. Contact the airline to make these arrangements.  · Limit how much alcohol you drink. An occasional drink may be OK when you dont have symptoms.  · Dont allow smoking in your home.  Follow-up care  Follow up with your healthcare provider in 1 week, or as advised.  When to seek medical advice  Call your healthcare provider right away if any of these occur:  · Pain that doesnt get better after taking the medicines prescribed  · Fever of 100.4°F (38ºC) or higher, or as directed by your healthcare provider  · Cough with dark sputum or shortness of breath  · Blood (pink, brown, or red)  in your urine  · Difficulty with speech or vision  · Any painful joint that becomes hot, swollen, or red  · Headache that is different from normal  · Not being able to move, or having part of your body become numb  · Sudden weakness or numbness  · Painful erection that does not go away  Date Last Reviewed: 2/28/2016  © 9010-0861 "[employer]. [address] [place] [address], [place] [address]. All rights reserved. This information is not intended as a substitute for professional medical care. Always follow your healthcare professional's instructions.             [facility] complies with applicable Federal civil rights laws and does not discriminate on the basis of race, color, national origin, age, disability, or sex.        Language Assistance Services     ATTENTION: Language assistance services are available, free of charge. Please call [phone].      ATENCIÓN: Si santy toussaint, tiene a kwon disposición servicios gratuitos de asistencia lingüística. Llame al [phone].     CHÚ Ý: N?u b?n nói Ti?ng Vi?t, có các d?ch v? h? tr? ngôn ng? mi?n phí dành cho b?n. G?i s? [phone].

## 2017-03-17 ENCOUNTER — TELEPHONE (OUTPATIENT)
Dept: HEMATOLOGY/ONCOLOGY | Facility: CLINIC | Age: 32
End: 2017-03-17

## 2017-03-17 NOTE — TELEPHONE ENCOUNTER
----- Message from Natan Contreras sent at 3/16/2017  2:34 PM CDT -----  Contact: self   Pt is calling to schedule a new consult for sickle cell trait, pt was seen in ER and was referred to Hematology.  Contact number [phone]

## 2017-03-20 ENCOUNTER — OFFICE VISIT (OUTPATIENT)
Dept: INTERNAL MEDICINE | Facility: CLINIC | Age: 32
End: 2017-03-20
Attending: FAMILY MEDICINE
Payer: COMMERCIAL

## 2017-03-20 VITALS
BODY MASS INDEX: 24.23 KG/M2 | HEART RATE: 86 BPM | DIASTOLIC BLOOD PRESSURE: 66 MMHG | HEIGHT: 69 IN | WEIGHT: 163.56 LBS | SYSTOLIC BLOOD PRESSURE: 118 MMHG

## 2017-03-20 DIAGNOSIS — H66.92 LEFT OTITIS MEDIA, UNSPECIFIED CHRONICITY, UNSPECIFIED OTITIS MEDIA TYPE: ICD-10-CM

## 2017-03-20 DIAGNOSIS — D57.3 SICKLE CELL TRAIT: ICD-10-CM

## 2017-03-20 DIAGNOSIS — A08.4 VIRAL GASTROENTERITIS: Primary | ICD-10-CM

## 2017-03-20 DIAGNOSIS — H92.02 CHRONIC LEFT EAR PAIN: ICD-10-CM

## 2017-03-20 DIAGNOSIS — G89.29 CHRONIC LEFT EAR PAIN: ICD-10-CM

## 2017-03-20 PROCEDURE — 99213 OFFICE O/P EST LOW 20 MIN: CPT | Mod: S$GLB,,, | Performed by: FAMILY MEDICINE

## 2017-03-20 PROCEDURE — 99999 PR PBB SHADOW E&M-EST. PATIENT-LVL III: CPT | Mod: PBBFAC,,, | Performed by: FAMILY MEDICINE

## 2017-03-20 RX ORDER — AZITHROMYCIN 250 MG/1
TABLET, FILM COATED ORAL
Qty: 6 TABLET | Refills: 0 | Status: SHIPPED | OUTPATIENT
Start: 2017-03-20 | End: 2017-03-25

## 2017-03-20 NOTE — MR AVS SNAPSHOT
[facility]  [address]  [facility] [address]  Phone: [phone]  Fax: [phone]                  Gautam Sotomayor   3/20/2017 2:40 PM   Office Visit    Description:  Female : 1985   Provider:  Donna Akhtar MD   Department:  [facility]           Reason for Visit     ED F/U           Diagnoses this Visit        Comments    Sickle cell trait    -  Primary     Chronic left ear pain         Left otitis media, unspecified chronicity, unspecified otitis media type                To Do List           Future Appointments        Provider Department Dept Phone    3/29/2017 9:00 AM Yao Le Jr., MD [place] - Hematology Oncology [phone]      Goals (5 Years of Data)     None       These Medications        Disp Refills Start End    azithromycin (Z-ADRIAN) 250 MG tablet 6 tablet 0 3/20/2017 3/25/2017    Take 2 tablets by mouth on day 1; Take 1 tablet by mouth on days 2-5    Pharmacy: [facility]/pharmacy #[id number] - [place], [place] - [address]  #: [phone]         [facility]     [facility] On Call Nurse Care Line -  Assistance  Registered nurses in the [facility] Call Center provide clinical advisement, health education, appointment booking, and other advisory services.  Call for this free service at [phone].             Medications           Message regarding Medications     Verify the changes and/or additions to your medication regime listed below are the same as discussed with your clinician today.  If any of these changes or additions are incorrect, please notify your healthcare provider.        START taking these NEW medications        Refills    azithromycin (Z-ADRIAN) 250 MG tablet 0    Sig: Take 2 tablets by mouth on day 1; Take 1 tablet by mouth on days 2-5    Class: Normal           Verify that the below list of medications is an accurate representation of the medications you are currently taking.  If none reported, the  "list may be blank. If incorrect, please contact your healthcare provider. Carry this list with you in case of emergency.           Current Medications     azithromycin (Z-ADRIAN) 250 MG tablet Take 2 tablets by mouth on day 1; Take 1 tablet by mouth on days 2-5    betamethasone valerate 0.1% (VALISONE) 0.1 % Lotn Apply topically 2 (two) times daily. qtts 3 AU Bid.    BIOTIN ORAL Take by mouth.    CETIRIZINE HCL (ZYRTEC ORAL) Take by mouth.    dicyclomine (BENTYL) 20 mg tablet Take 1 tablet (20 mg total) by mouth 2 (two) times daily.    fluticasone (FLONASE) 50 mcg/actuation nasal spray 2 sprays by Each Nare route once daily.    folic acid (FOLVITE) 400 MCG tablet Take 400 mcg by mouth once daily.    ondansetron (ZOFRAN) 4 MG tablet Take 1 tablet (4 mg total) by mouth every 8 (eight) hours as needed.           Clinical Reference Information           Your Vitals Were     BP Pulse Height Weight BMI    118/66 (BP Location: Left arm, Patient Position: Sitting, BP Method: Manual) 86 5' 9" (1.753 m) 74.2 kg (163 lb 9.3 oz) 24.16 kg/m2      Blood Pressure          Most Recent Value    BP  118/66      Allergies as of 3/20/2017     Nickel      Immunizations Administered on Date of Encounter - 3/20/2017     None      Orders Placed During Today's Visit      Normal Orders This Visit    Ambulatory referral to ENT     Ambulatory Referral to Hematology / Oncology       Smoking Cessation     If you would like to quit smoking:   You may be eligible for free services if you are a [place] resident and started smoking cigarettes before September 1, 1988.  Call the [employer]oking [employer]essation [employer] (S[employer]) toll free at (524) 092-0099 or (146) [phone].   Call 0-223-QUIT-NOW if you do not meet the above criteria.            Language Assistance Services     ATTENTION: Language assistance services are available, free of charge. Please call [phone].      ATENCIÓN: Si habla español, tiene a kwon disposición servicios gratuitos de asistencia " lingüística. Agustin al [phone].     ELZA Ý: N?u b?n nói Ti?ng Vi?t, có các d?ch v? h? tr? ngôn ng? mi?n phí dành cho b?n. G?i s? [phone].         [facility] complies with applicable Federal civil rights laws and does not discriminate on the basis of race, color, national origin, age, disability, or sex.

## 2017-03-21 NOTE — PROGRESS NOTES
Subjective:       Patient ID: Gautam Sotomayor is a 31 y.o. female.    Chief Complaint: ED F/U (stomach virus)    HPI Comments: Pt presents today for f/u from ED. Pt was dx'd with viral gastroenteritis along with sickle cell crisis. Pt also c/o today with left ear pain. Has ongoing sinus issues and uses flonase as well as zyrtec daily. Helping pt but she is also not using nasal sprays regularly. Pt does have chronic pain in her left ear and was told by ENT that it was not infectious and just to take steroid ear drops to help. Pt states that it still hurts    Review of Systems   Constitutional: Negative.    HENT: Positive for congestion, ear pain and sinus pressure.    Eyes: Negative.    Respiratory: Negative for cough, chest tightness and shortness of breath.    Cardiovascular: Negative for chest pain, palpitations and leg swelling.   Gastrointestinal: Negative.    Musculoskeletal: Negative.  Negative for joint swelling.   Skin: Negative.    Neurological: Negative for dizziness, weakness, light-headedness and headaches.       Objective:      Physical Exam   Constitutional: She is oriented to person, place, and time. She appears well-developed and well-nourished. No distress.   HENT:   Head: Normocephalic and atraumatic.   Right Ear: A middle ear effusion is present.   Left Ear: No tenderness. Tympanic membrane is not erythematous.  No middle ear effusion.   Nose: Mucosal edema and rhinorrhea present.   Mouth/Throat: Posterior oropharyngeal edema and posterior oropharyngeal erythema present. No oropharyngeal exudate or tonsillar abscesses.   Eyes: Conjunctivae and EOM are normal. Pupils are equal, round, and reactive to light.   Neck: Normal range of motion. Neck supple. No thyromegaly present.   Cardiovascular: Normal rate, regular rhythm, normal heart sounds and intact distal pulses.    No murmur heard.  Pulmonary/Chest: Effort normal and breath sounds normal. No respiratory distress. She has no wheezes. She  has no rales. She exhibits no tenderness.   Musculoskeletal: Normal range of motion. She exhibits no edema.   Lymphadenopathy:     She has no cervical adenopathy.   Neurological: She is alert and oriented to person, place, and time. She displays normal reflexes. No cranial nerve deficit. She exhibits normal muscle tone. Coordination normal.   Skin: Skin is warm. She is not diaphoretic. No erythema.   Psychiatric: She has a normal mood and affect. Her behavior is normal. Judgment and thought content normal.       Assessment:       1. Sickle cell trait    2. Chronic left ear pain    3. Left otitis media, unspecified chronicity, unspecified otitis media type        Plan:       Sickle cell trait: pt states that pain has since resolved. Will refer to heme  Chronic left ear pain: early ssx of sinusitis /acute OM noted, will order abx  Viral Gastro: resolved. BRAT diet suggested with increased fluids  RTC prn symptoms  Sickle cell trait  -     Ambulatory Referral to Hematology / Oncology    Chronic left ear pain  -     Ambulatory referral to ENT    Left otitis media, unspecified chronicity, unspecified otitis media type  -     azithromycin (Z-ADRIAN) 250 MG tablet; Take 2 tablets by mouth on day 1; Take 1 tablet by mouth on days 2-5  Dispense: 6 tablet; Refill: 0

## 2017-03-29 ENCOUNTER — LAB VISIT (OUTPATIENT)
Dept: LAB | Facility: HOSPITAL | Age: 32
End: 2017-03-29
Attending: INTERNAL MEDICINE
Payer: COMMERCIAL

## 2017-03-29 ENCOUNTER — INITIAL CONSULT (OUTPATIENT)
Dept: HEMATOLOGY/ONCOLOGY | Facility: CLINIC | Age: 32
End: 2017-03-29
Payer: COMMERCIAL

## 2017-03-29 VITALS
WEIGHT: 166 LBS | HEART RATE: 68 BPM | DIASTOLIC BLOOD PRESSURE: 78 MMHG | BODY MASS INDEX: 24.59 KG/M2 | SYSTOLIC BLOOD PRESSURE: 118 MMHG | HEIGHT: 69 IN

## 2017-03-29 DIAGNOSIS — Z90.81 POST-SPLENECTOMY: ICD-10-CM

## 2017-03-29 DIAGNOSIS — D57.20 SICKLE CELL-HEMOGLOBIN C DISEASE WITHOUT CRISIS: ICD-10-CM

## 2017-03-29 DIAGNOSIS — D57.20 SICKLE CELL-HEMOGLOBIN C DISEASE WITHOUT CRISIS: Primary | ICD-10-CM

## 2017-03-29 DIAGNOSIS — D72.829 LEUKOCYTOSIS, UNSPECIFIED TYPE: ICD-10-CM

## 2017-03-29 LAB
BASOPHILS # BLD AUTO: 0.06 K/UL
BASOPHILS NFR BLD: 0.7 %
DIFFERENTIAL METHOD: ABNORMAL
EOSINOPHIL # BLD AUTO: 0.1 K/UL
EOSINOPHIL NFR BLD: 1.6 %
ERYTHROCYTE [DISTWIDTH] IN BLOOD BY AUTOMATED COUNT: 15.7 %
FERRITIN SERPL-MCNC: 143 NG/ML
HCT VFR BLD AUTO: 32.3 %
HGB BLD-MCNC: 11.5 G/DL
LYMPHOCYTES # BLD AUTO: 3.4 K/UL
LYMPHOCYTES NFR BLD: 41.1 %
MCH RBC QN AUTO: 28.9 PG
MCHC RBC AUTO-ENTMCNC: 35.6 %
MCV RBC AUTO: 81 FL
MONOCYTES # BLD AUTO: 0.9 K/UL
MONOCYTES NFR BLD: 10.3 %
NEUTROPHILS # BLD AUTO: 3.9 K/UL
NEUTROPHILS NFR BLD: 46.1 %
PLATELET # BLD AUTO: 334 K/UL
PMV BLD AUTO: 10.7 FL
RBC # BLD AUTO: 3.98 M/UL
WBC # BLD AUTO: 8.35 K/UL

## 2017-03-29 PROCEDURE — 99244 OFF/OP CNSLTJ NEW/EST MOD 40: CPT | Mod: S$GLB,,, | Performed by: INTERNAL MEDICINE

## 2017-03-29 PROCEDURE — 85025 COMPLETE CBC W/AUTO DIFF WBC: CPT

## 2017-03-29 PROCEDURE — 83020 HEMOGLOBIN ELECTROPHORESIS: CPT

## 2017-03-29 PROCEDURE — 99999 PR PBB SHADOW E&M-EST. PATIENT-LVL III: CPT | Mod: PBBFAC,,, | Performed by: INTERNAL MEDICINE

## 2017-03-29 PROCEDURE — 83020 HEMOGLOBIN ELECTROPHORESIS: CPT | Mod: 91

## 2017-03-29 PROCEDURE — 36415 COLL VENOUS BLD VENIPUNCTURE: CPT

## 2017-03-29 PROCEDURE — 82728 ASSAY OF FERRITIN: CPT

## 2017-03-29 NOTE — LETTER
March 29, 2017      Donna Akhtar MD  [address]  [facility] [address]           [facility]  [address]  [facility] [address]  Phone: [phone]          Patient: Gautam Sotomayor   MR Number: [id number]   YOB: 1985   Date of Visit: 3/29/2017       Dear Dr. Donna Akhtar:    Thank you for referring Gautam Sotomayor to me for evaluation. Attached you will find relevant portions of my assessment and plan of care.    If you have questions, please do not hesitate to call me. I look forward to following Gautam Sotomayor along with you.    Sincerely,    Yao Le Jr., MD    Enclosure  CC:  No Recipients    If you would like to receive this communication electronically, please contact [email or web address] or [phone] to request more information on [employer] access.    For providers and/or their staff who would like to refer a patient to [facility], please contact us through our one-stop-shop provider referral line, [facility], at [phone].    If you feel you have received this communication in error or would no longer like to receive these types of communications, please e-mail [email or web address]

## 2017-03-29 NOTE — MR AVS SNAPSHOT
[facility]  [address]  [facility] [address]  Phone: [phone]                  Gautam Sotomayor   3/29/2017 9:00 AM   Appointment    Description:  Female : 1985   Provider:  Yao Le Jr., MD   Department:  [facility]                To Do List           Goals (5 Years of Data)     None      [facility]     [facility] Nurse Care Line -  Assistance  Registered nurses in the [facility] Center provide clinical advisement, health education, appointment booking, and other advisory services.  Call for this free service at [phone].             Medications           Message regarding Medications     Verify the changes and/or additions to your medication regime listed below are the same as discussed with your clinician today.  If any of these changes or additions are incorrect, please notify your healthcare provider.             Verify that the below list of medications is an accurate representation of the medications you are currently taking.  If none reported, the list may be blank. If incorrect, please contact your healthcare provider. Carry this list with you in case of emergency.           Current Medications     betamethasone valerate 0.1% (VALISONE) 0.1 % Lotn Apply topically 2 (two) times daily. qtts 3 AU Bid.    BIOTIN ORAL Take by mouth.    CETIRIZINE HCL (ZYRTEC ORAL) Take by mouth.    dicyclomine (BENTYL) 20 mg tablet Take 1 tablet (20 mg total) by mouth 2 (two) times daily.    fluticasone (FLONASE) 50 mcg/actuation nasal spray 2 sprays by Each Nare route once daily.    folic acid (FOLVITE) 400 MCG tablet Take 400 mcg by mouth once daily.    ondansetron (ZOFRAN) 4 MG tablet Take 1 tablet (4 mg total) by mouth every 8 (eight) hours as needed.           Clinical Reference Information           Allergies as of 3/29/2017     Nickel      Immunizations Administered on Date of Encounter - 3/29/2017     None      Smoking  Cessation     If you would like to quit smoking:   You may be eligible for free services if you are a [place] resident and started smoking cigarettes before September 1, 1988.  Call the Smoking Cessation Trust ([employer]) toll free at (061) 276-7515 or (480) 503-8238.   Call 1-800-QUIT-NOW if you do not meet the above criteria.            Language Assistance Services     ATTENTION: Language assistance services are available, free of charge. Please call [phone].      ATENCIÓN: Si habla breana, tiene a kwon disposición servicios gratuitos de asistencia lingüística. Llame al [phone].     CHÚ Ý: N?u b?n nói Ti?ng Vi?t, có các d?ch v? h? tr? ngôn ng? mi?n phí dành cho b?n. G?i s? [phone].         [facility] complies with applicable Federal civil rights laws and does not discriminate on the basis of race, color, national origin, age, disability, or sex.

## 2017-03-29 NOTE — PROGRESS NOTES
"REFERRING PHYSICIAN:  Donna Akhtar M.D.    Ms. Sotomayor is a 31-year-old woman who is seen in consultation from Dr. Donna Akhtar.  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    Unfortunately, the term sickle cell trait appears at many locations in her   chart.  This is probably because the patient was confused about her diagnosis.    After reviewing her history, it is very clear that she does not have sickle cell   trait.  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    She was found to have a hemoglobin disorder as a small child.  She recalls   having various pains primarily in joints and sometimes in bones that prompted   physician or Emergency Room visits.  As she aged, these became far less common.    She had one episode of pain about five years ago and two weeks ago, she had   pain involving both of her legs.  She had a visit to the Emergency Room on   03/13/2017.  She had symptoms of gastroenteritis and apparently became volume   depleted.  It was at that time that she had the leg pain.  She was treated with   intravenous analgesics and intravenous fluids, and the symptoms have resolved.    She has had some fatigue since then.    She was told at an early age that she had an enlarged spleen, which was likely a   consequence of her hemoglobinopathy.  At age 21, she had splenic rupture, which   she thinks was a consequence of receiving a massage in a machine that caused    abdominal pressure.  She had an emergency appendectomy at a hospital in   [place] and appendectomy was performed at that time.    She has had some of the appropriate vaccines for patients who have no spleen.    She had a 23-valent pneumococcal vaccine in October 2016.  She last had a   meningococcal vaccine in 2010, and she has had haemophilus influenza B vaccines.    At this time, she should have a pneumococcal 13 vaccine and " "a booster of   meningococcal vaccine.  The meningococcal booster should be repeated every five   years.  It is unclear at this time whether repeated pneumococcal vaccines are of   value, but it is reasonable to consider repeating the 23-valent vaccine every   5-10 years.    I gave her a long explanation about the extreme importance of explaining to any   medical personnel who are seeing her for fever or infection that she does not   have a spleen and I explained to her that she has an increased risk of infection   with encapsulated organisms that cannot be controlled easily.  One is much more   liberal with early antibiotic use in patients who are splenectomized.    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  I told her that it is important   to continue with these visits, since retinopathy is the most common   manifestation of hemoglobin SC disease.  She has cholelithiasis, which is likely   a consequence of lifelong hemolysis.  She has had several ear infections in the   last six months.    She has regular menstrual periods, which she describes as being light and   lasting for three or four days.  She has thyroid nodules and ovarian cyst.    She drinks little alcohol.  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  She smokes one or two cigarettes a day.  She is single.  She   has no children.    Her father had "sickle cell trait," as did a paternal cousin.  A paternal uncle   had sickle cell anemia and  in his 60s of complications of this disease.    Additional past history, system review, social history and family history have   been reviewed and updated in the electronic record.    PHYSICAL EXAMINATION:  GENERAL APPEARANCE:  A well-developed, well-nourished  female in   no distress.  EYES:  No jaundice or pallor.  SINUSES:  No tenderness.  MOUTH AND THROAT:  Good dentition.  No mucosal " lesions.  NECK:  Mild thyromegaly with multiple tiny nodules.  No bruits.  LYMPH NODES:  No enlarged cervical, axillary or inguinal nodes.  CHEST AND LUNGS:  Normal respiratory effort.  Clear to auscultation and   percussion.  HEART:  Regular rate and rhythm, without murmur or gallop.  ABDOMEN:  Soft, without masses or tenderness.  No hepatosplenomegaly.  Midline   abdominal scar.  EXTREMITIES:  No edema or cyanosis.  PERIPHERAL VASCULATURE:  Good pulses in the feet and ankles.  NEUROLOGIC:  Motor function is normal.  Mental status is good.  She is fully   oriented.  SKIN:  Several tattoos, including one on the lower abdomen.    IMPRESSION:  1.  Hemoglobin SC disease.  2.  Prior splenectomy.  3.  Intermittent mild leukocytosis and thrombocytosis, which is commonly seen in   patients who have had splenectomy.    RECOMMENDATIONS:  1.  As noted above, she should have pneumococcal 13 and meningococcal   vaccinations and I ordered them  2.  She should continue to take folic acid daily.  3.  She has been informed about the importance of notifying any medical   personnel that she has no spleen and seeking immediate attention for any fever   or other signs of infection.  4.  Blood will be drawn today for CBC, hemoglobin electrophoresis and ferritin.  5.  She is considering pregnancy.  I have told her that her partner should have   a CBC and a hemoglobin electrophoresis to determine if he has any hemoglobin   disorder.  If he does not have microcytosis or anemia and has a normal   hemoglobin electrophoresis, I do not think any additional investigation is   required.  If there is some evidence of a hemoglobinopathy, this should be   reviewed in detail with an obstetrician and/or a .  6.  I described in the above note issues of future vaccinations.  7.  I am not giving her a return appointment, but she can arrange to see me, if   other problems related to hemoglobin SC disease occur in the future.      ERIBERTOB/HN  dd:  03/29/2017 09:59:30 (CDT)  td: 03/30/2017 10:09:33 (CDT)  Doc ID   #[id number]  Job ID #[id number]    CC: Donna Akhtar M.D.

## 2017-04-04 LAB
HGB A2 MFR BLD HPLC: 3.7 %
HGB FRACT BLD ELPH PH6.0-IMP: NORMAL
HGB FRACT BLD ELPH-IMP: ABNORMAL
HGB FRACT BLD ELPH-IMP: ABNORMAL

## 2017-04-05 ENCOUNTER — CLINICAL SUPPORT (OUTPATIENT)
Dept: INFECTIOUS DISEASES | Facility: CLINIC | Age: 32
End: 2017-04-05
Payer: COMMERCIAL

## 2017-04-05 PROCEDURE — 90670 PCV13 VACCINE IM: CPT | Mod: S$GLB,,, | Performed by: INTERNAL MEDICINE

## 2017-04-05 PROCEDURE — 90734 MENACWYD/MENACWYCRM VACC IM: CPT | Mod: S$GLB,,, | Performed by: INTERNAL MEDICINE

## 2017-04-05 PROCEDURE — 90471 IMMUNIZATION ADMIN: CPT | Mod: S$GLB,,, | Performed by: INTERNAL MEDICINE

## 2017-04-05 PROCEDURE — 99999 PR PBB SHADOW E&M-EST. PATIENT-LVL II: CPT | Mod: PBBFAC,,,

## 2017-04-05 PROCEDURE — 90472 IMMUNIZATION ADMIN EACH ADD: CPT | Mod: S$GLB,,, | Performed by: INTERNAL MEDICINE

## 2017-04-06 ENCOUNTER — OFFICE VISIT (OUTPATIENT)
Dept: OPTOMETRY | Facility: CLINIC | Age: 32
End: 2017-04-06
Payer: COMMERCIAL

## 2017-04-06 DIAGNOSIS — D57.20 SICKLE CELL-HEMOGLOBIN C DISEASE WITHOUT CRISIS: ICD-10-CM

## 2017-04-06 DIAGNOSIS — H35.9 RETINA DISORDER, BILATERAL: Primary | ICD-10-CM

## 2017-04-06 DIAGNOSIS — Z46.0 ENCOUNTER FOR FITTING OR ADJUSTMENT OF SPECTACLES OR CONTACT LENSES: Primary | ICD-10-CM

## 2017-04-06 DIAGNOSIS — H52.13 MYOPIA, BILATERAL: ICD-10-CM

## 2017-04-06 PROCEDURE — 92310 CONTACT LENS FITTING OU: CPT | Mod: ,,, | Performed by: OPTOMETRIST

## 2017-04-06 PROCEDURE — 92250 FUNDUS PHOTOGRAPHY W/I&R: CPT | Mod: S$GLB,,, | Performed by: OPTOMETRIST

## 2017-04-06 PROCEDURE — 92015 DETERMINE REFRACTIVE STATE: CPT | Mod: S$GLB,,, | Performed by: OPTOMETRIST

## 2017-04-06 PROCEDURE — 99499 UNLISTED E&M SERVICE: CPT | Mod: S$GLB,,, | Performed by: OPTOMETRIST

## 2017-04-06 PROCEDURE — 92004 COMPRE OPH EXAM NEW PT 1/>: CPT | Mod: S$GLB,,, | Performed by: OPTOMETRIST

## 2017-04-06 PROCEDURE — 99999 PR PBB SHADOW E&M-EST. PATIENT-LVL III: CPT | Mod: PBBFAC,,, | Performed by: OPTOMETRIST

## 2017-04-06 PROCEDURE — 99999 PR PBB SHADOW E&M-EST. PATIENT-LVL II: CPT | Mod: PBBFAC,,, | Performed by: OPTOMETRIST

## 2017-04-06 NOTE — PATIENT INSTRUCTIONS
History of sickle cell hemoglobin C disease.  Bilateral periheral retinal changes/scarring.      Bilateral fundus photos.  Generate referral for retina consult.     Myopia in each eye.  Spectacle lens Rx issued for use in lieu of CLs.    Wearing soft contact leneses.  Good contact lens lens fit in each eye.  Wearing CLs well in both eyes.   Power of right CL okay as is.  Showing need for power change in left lens per over-refraction done today.   Defer CL Rx.  Ms. Sotomayor will send info on current CL Rx,  Thereafter, finalize CL Rx and will send new CL Rx to Ms. Sotomayor.    Repeat general eye examination and refraction, and contact lens follow-up, in one year.     Received message from Ms. Sotomayor that she is currently wearing Acuvue Oasys SCLs:    OD  8.4  14.0   -2.75    OS  8.4   14.0  -2.75  Thus, if new CL Rx were to be issued per over-refraction done on 04/06/2017, the bertrand would be -2.75 in the right eye, and -3.25 in the left eye.  However, the refraction done following removal of the CLs on 04/06/2017 indicated the need for correction as follows:     OD  -4.50 DS     OS  -3.50 DS   Defer new CL Rx.  Will have Ms. Mason call Ms. Sotomayor to have her return for a few minutes for reassessment of over-refraction (over CLs) AND repeat of post CL refraction

## 2017-04-06 NOTE — MR AVS SNAPSHOT
[facility]  [address]  [facility] [address]  Phone: [phone]  Fax: [phone]                  Gautam Sotomayor   2017 10:15 AM   Office Visit    Description:  Female : 1985   Provider:  Jaylon Crisostomo OD   Department:  [facility]           Reason for Visit     Contact Lens Follow Up                To Do List           Future Appointments        Provider Department Dept Phone    5/15/2017 1:30 PM Skylar Aviles MD [facility] - OB[facility]N [address] [phone]      Goals (5 Years of Data)     None      [facility]     [facility] On Call Nurse Care Line -  Assistance  Unless otherwise directed by your provider, please contact [facility], our nurse care line that is available for  assistance.     Registered nurses in the [facility] On Call Center provide: appointment scheduling, clinical advisement, health education, and other advisory services.  Call: [phone] (toll free)               Medications           Message regarding Medications     Verify the changes and/or additions to your medication regime listed below are the same as discussed with your clinician today.  If any of these changes or additions are incorrect, please notify your healthcare provider.             Verify that the below list of medications is an accurate representation of the medications you are currently taking.  If none reported, the list may be blank. If incorrect, please contact your healthcare provider. Carry this list with you in case of emergency.           Current Medications     betamethasone valerate 0.1% (VALISONE) 0.1 % Lotn Apply topically 2 (two) times daily. qtts 3 AU Bid.    BIOTIN ORAL Take by mouth.    CETIRIZINE HCL (ZYRTEC ORAL) Take by mouth.    dicyclomine (BENTYL) 20 mg tablet Take 1 tablet (20 mg total) by mouth 2 (two) times daily.    fluticasone (FLONASE) 50 mcg/actuation nasal spray 2 sprays by Each Nare route once daily.    folic acid  (FOLVITE) 400 MCG tablet Take 400 mcg by mouth once daily.    ondansetron (ZOFRAN) 4 MG tablet Take 1 tablet (4 mg total) by mouth every 8 (eight) hours as needed.           Clinical Reference Information           Your Vitals Were     Last Period                   03/24/2017           Allergies as of 4/6/2017     Nickel      Immunizations Administered on Date of Encounter - 4/6/2017     None      Instructions    Bilateral periheral retinal changes/scarring.  Note h/o Hemoglobin SC  Bilateral fundus photos.  Generate referral for retina consult.   Defer CL Rx.  Send info on CL Rx - will send new CL rx thereafter.  Myopia OD and OS.  New spectacle lens Rx issuued       Smoking Cessation     If you would like to quit smoking:   You may be eligible for free services if you are a [place] resident and started smoking cigarettes before September 1, 1988.  Call the Smoking Cessation Trust ([facility]) toll free at ([phone] [phone] or (981) 496-0501.   Call 1-800-QUIT-NOW if you do not meet the above criteria.   Contact us via email: [email or web address]   View our website for more information: [email or web address]        Language Assistance Services     ATTENTION: Language assistance services are available, free of charge. Please call [phone].      ATENCIÓN: Si habla katerynaañol, tiene a kwon disposición servicios gratuitos de asistencia lingüística. Llame al [phone].     ELZA Ý: N?u b?n nói Ti?ng Vi?t, có các d?ch v? h? tr? ngôn ng? mi?n phí dành cho b?n. G?i s? [phone].         [facility] complies with applicable Federal civil rights laws and does not discriminate on the basis of race, color, national origin, age, disability, or sex.

## 2017-04-06 NOTE — MR AVS SNAPSHOT
[facility]  [address]  [facility] [address]  Phone: [phone]  Fax: [phone]                  Gautam Sotomayor   2017 10:45 AM   Office Visit    Description:  Female : 1985   Provider:  Jaylon Crisostomo OD   Department:  [facility]           Reason for Visit     Concerns About Ocular Health           Diagnoses this Visit        Comments    Retina disorder, bilateral    -  Primary            To Do List           Future Appointments        Provider Department Dept Phone    5/15/2017 1:30 PM Skylar Aviles MD [facility] - OB/GYN [address] [phone]      Goals (5 Years of Data)     None      [facility]     [facility] On Call Nurse Care Line -  Assistance  Unless otherwise directed by your provider, please contact [facility], our nurse care line that is available for  assistance.     Registered nurses in the [facility] On Call Center provide: appointment scheduling, clinical advisement, health education, and other advisory services.  Call: [phone] (toll free)               Medications           Message regarding Medications     Verify the changes and/or additions to your medication regime listed below are the same as discussed with your clinician today.  If any of these changes or additions are incorrect, please notify your healthcare provider.             Verify that the below list of medications is an accurate representation of the medications you are currently taking.  If none reported, the list may be blank. If incorrect, please contact your healthcare provider. Carry this list with you in case of emergency.           Current Medications     betamethasone valerate 0.1% (VALISONE) 0.1 % Lotn Apply topically 2 (two) times daily. qtts 3 AU Bid.    BIOTIN ORAL Take by mouth.    CETIRIZINE HCL (ZYRTEC ORAL) Take by mouth.    dicyclomine (BENTYL) 20 mg tablet Take 1 tablet (20 mg total) by mouth 2 (two) times daily.    fluticasone  (FLONASE) 50 mcg/actuation nasal spray 2 sprays by Each Nare route once daily.    folic acid (FOLVITE) 400 MCG tablet Take 400 mcg by mouth once daily.    ondansetron (ZOFRAN) 4 MG tablet Take 1 tablet (4 mg total) by mouth every 8 (eight) hours as needed.           Clinical Reference Information           Your Vitals Were     Last Period                   03/24/2017           Allergies as of 4/6/2017     Nickel      Immunizations Administered on Date of Encounter - 4/6/2017     None      Orders Placed During Today's Visit      Normal Orders This Visit    Color Fundus Photography - OU - Both Eyes       Instructions    Bilateral periheral retinal changes/scarring.  Note h/o Hemoglobin SC  Bilateral fundus photos.  Generate referral for retina consult.   Defer CL Rx.  Send info on CL Rx - will send new CL rx thereafter.  Myopia OD and OS.  New spectacle lens Rx issuued         Smoking Cessation     If you would like to quit smoking:   You may be eligible for free services if you are a [place] resident and started smoking cigarettes before September 1, 1988.  Call the Smoking Cessation Trust ([facility]) toll free at (998) 098-5904 or (044) 499-6043.   Call 1-800-QUIT-NOW if you do not meet the above criteria.   Contact us via email: [email or web address]   View our website for more information: [email or web address]        Language Assistance Services     ATTENTION: Language assistance services are available, free of charge. Please call [phone].      ATENCIÓN: Si habla español, tiene a kwon disposición servicios gratuitos de asistencia lingüística. Llame al [phone].     CHÚ Ý: N?u b?n nói Ti?ng Vi?t, có các d?ch v? h? tr? ngôn ng? mi?n phí dành cho b?n. G?i s? [phone].         [facility] complies with applicable Federal civil rights laws and does not discriminate on the basis of race, color, national origin, age, disability, or sex.

## 2017-04-07 ENCOUNTER — PATIENT MESSAGE (OUTPATIENT)
Dept: OPTOMETRY | Facility: CLINIC | Age: 32
End: 2017-04-07

## 2017-04-07 NOTE — PROGRESS NOTES
HPI     Concerns About Ocular Health    Additional comments: Eye exam and refraction, and contact lens follow-up              Comments   Patient's age: 31 y.o. AA female  Occupation: Nurse [employer]   Approximate date of last eye examination: 1 yr ago  Name of last eye doctor seen: Dr. tanner Ramachandran   City/State: [employer]  In [place], [place]   Wears glasses? No - states she has no glasses.    Any problem with [facility] with glasses?  Pt would like a rx for eye glasses   Wears CLs?:  Yes            If yes:              Type of CL worn:  SCL's  - did not bring any info on current   CL Rx, but has info at home.               Wears full-time or part-time:  Full time                Sleeps with contact lenses:  No                CL Solution used:                 How often replace CLs:  2-3 weeks               Any problem with [facility] with CLs?  Patient states the contact lens   are not as comfortable as before.              Has patient read and signed the contact lens fee information   document? yes  Headaches?  No  Eye pain/discomfort?  No                                                                                     Flashes?  No  Floaters?  No  Diplopia/Double vision?  No  Patient's Ocular History:         Any eye surgeries? No         Any eye injury?  No         Any treatment for eye disease?  ? Sickle cell - like retinal   changes - followed only by Dr. Ramachandran  - s/p spleenectomy.    Family history of eye disease?  No  Significant patient medical history:         1. Diabetes?  No       If yes, IDDM or NIDDM? n/a   2. HBP?  No              3. 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 OTC eyedrops currently using: Visine    ! Prescription eye meds currently using:  No   ! Any history of allergy/adverse reaction to any eye meds used   previously?  No    ! Any history of allergy/adverse reaction to eyedrops used during prior   eye exam(s)? No    ! Any history of allergy/adverse reaction to Novacaine or similar  meds?   No   ! Any history of allergy/adverse reaction to Epinephrine or similar meds?   No    ! Patient okay with use of anesthetic eyedrops to check eye pressure?    yes        ! Patient okay with use of eyedrops to dilate pupils today?  yes   !  Allergies/Medications/Medical History/Family History reviewed today?    Yes       PD =   65/61    Post CL auto refraction:    OD  -5.50  DS     OS  -4.00 DS                                                  Last edited by Jaylon Crisostomo, OD on 4/6/2017 11:37 AM. (History)            Assessment /Plan     For exam results, see Encounter Report.    1. Retina disorder, bilateral  Color Fundus Photography - OU - Both Eyes    Ambulatory Referral to Ophthalmology   2. 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 Myopia, bilateral                  History of sickle cell hemoglobin C disease.  Bilateral periheral retinal changes/scarring.      Bilateral fundus photos.  Generate referral for retina consult.     Myopia in each eye.  Spectacle lens Rx issued for use in lieu of CLs.    Wearing soft contact leneses.  Good contact lens lens fit in each eye.  Wearing CLs well in both eyes.   Power of right CL okay as is.  Showing need for power change in left lens per over-refraction done today.   Defer CL Rx.  Ms. Sotomayor will send info on current CL Rx,  Thereafter, finalize CL Rx and will send new CL Rx to Ms. Sotomayor.    Repeat general eye examination and refraction, and contact lens follow-up, in one year.       Received message from Ms. Sotomayor that she is currently wearing Acuvue Oasys SCLs:    OD  8.4  14.0   -2.75    OS  8.4   14.0  -2.75  Thus, if new CL Rx were to be issued per over-refraction done on 04/06/2017, the bertrand would be -2.75 in the right eye, and -3.25 in the left eye.  However, the refraction done following removal of the CLs on 04/06/2017 indicated the need for correction as follows:     OD  -4.50 DS     OS   -3.50 DS   Defer new CL Rx.  Will have Ms. Mason call Ms. Sotomayor to have her return for a few minutes for reassessment of over-refraction (over CLs) AND repeat of post CL refraction

## 2017-04-19 DIAGNOSIS — J30.1 SEASONAL ALLERGIC RHINITIS DUE TO POLLEN: ICD-10-CM

## 2017-04-20 RX ORDER — FLUTICASONE PROPIONATE 50 MCG
2 SPRAY, SUSPENSION (ML) NASAL DAILY
Qty: 16 G | Refills: 0 | Status: SHIPPED | OUTPATIENT
Start: 2017-04-20 | End: 2017-10-16

## 2017-04-25 ENCOUNTER — INITIAL CONSULT (OUTPATIENT)
Dept: OPHTHALMOLOGY | Facility: CLINIC | Age: 32
End: 2017-04-25
Payer: COMMERCIAL

## 2017-04-25 DIAGNOSIS — D57.20 SICKLE CELL-HEMOGLOBIN C DISEASE WITHOUT CRISIS: ICD-10-CM

## 2017-04-25 DIAGNOSIS — D57.1 SICKLE CELL RETINOPATHY WITHOUT CRISIS: Primary | ICD-10-CM

## 2017-04-25 DIAGNOSIS — H36.89 SICKLE CELL RETINOPATHY WITHOUT CRISIS: Primary | ICD-10-CM

## 2017-04-25 PROCEDURE — 92014 COMPRE OPH EXAM EST PT 1/>: CPT | Mod: S$GLB,,, | Performed by: OPHTHALMOLOGY

## 2017-04-25 PROCEDURE — 99999 PR PBB SHADOW E&M-EST. PATIENT-LVL II: CPT | Mod: PBBFAC,,, | Performed by: OPHTHALMOLOGY

## 2017-04-25 PROCEDURE — 92225 PR SPECIAL EYE EXAM, INITIAL: CPT | Mod: LT,S$GLB,, | Performed by: OPHTHALMOLOGY

## 2017-04-25 NOTE — LETTER
April 25, 2017      Jaylon Crisostomo, OD  [address]  [facility] [address]           [facility] - Ophthalmology  [address]  [facility] [address]  Phone: [phone]  Fax: [phone]          Patient: Gautam Sotomayor   MR Number: [id number]   YOB: 1985   Date of Visit: 4/25/2017       Dear Dr. Jaylon Crisostomo:    Thank you for referring Gautam Sotomayor to me for evaluation. Attached you will find relevant portions of my assessment and plan of care.    If you have questions, please do not hesitate to call me. I look forward to following Gautam Sotomayor along with you.    Sincerely,    TREVOR Wright MD    Enclosure  CC:  No Recipients    If you would like to receive this communication electronically, please contact [email or web address] or [phone] to request more information on [employer] access.    For providers and/or their staff who would like to refer a patient to [facility], please contact us through our one-stop-shop provider referral line, [facility], at [phone].    If you feel you have received this communication in error or would no longer like to receive these types of communications, please e-mail [email or web address]

## 2017-04-25 NOTE — PROGRESS NOTES
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        1 yr.

## 2017-04-25 NOTE — MR AVS SNAPSHOT
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      [facility] On Call     [facility] On Call Nurse Care Line -  Assistance  Unless otherwise directed by your provider, please contact [facility], our nurse care line that is available for  assistance.     Registered nurses in the [facility] On [facility] Center provide: appointment scheduling, clinical advisement, health education, and other advisory services.  Call: [phone] (toll free)               Medications           Message regarding Medications     Verify the changes and/or additions to your medication regime listed below are the same as discussed with your clinician today.  If any of these changes or additions are incorrect, please notify your healthcare provider.             Verify that the below list of medications is an accurate representation of the medications you are currently taking.  If none reported, the list may be blank. If incorrect, please contact your healthcare provider. Carry this list with you in case of emergency.           Current Medications     BIOTIN ORAL Take by mouth.    CETIRIZINE HCL (ZYRTEC ORAL)  Take by mouth.    fluticasone (FLONASE) 50 mcg/actuation nasal spray 2 sprays by Each Nare route once daily.    folic acid (FOLVITE) 400 MCG tablet Take 400 mcg by mouth once daily.    betamethasone valerate 0.1% (VALISONE) 0.1 % Lotn Apply topically 2 (two) times daily. qtts 3 AU Bid.    ondansetron (ZOFRAN) 4 MG tablet Take 1 tablet (4 mg total) by mouth every 8 (eight) hours as needed.           Clinical Reference Information           Your Vitals Were     Last Period                   03/24/2017           Allergies as of 4/25/2017     Nickel      Immunizations Administered on Date of Encounter - 4/25/2017     None      Smoking Cessation     If you would like to quit smoking:   You may be eligible for free services if you are a [place] resident and started smoking cigarettes before September 1, 1988.  Call the Smoking Cessation Trust ([facility]) toll free at (870) 184-6785 or (204) 826-7253.   Call 1-800-QUIT-NOW if you do not meet the above criteria.   Contact us via email: [email or web address]   View our website for more information: [email or web address]        Language Assistance Services     ATTENTION: Language assistance services are available, free of charge. Please call [phone].      ATENCIÓN: Si bassamla katerynanyasia, tiene a kwon disposición servicios gratuitos de asistencia lingüística. Llame al [phone].     [facility] Ý: N?u b?n nói Ti?ng Vi?t, có các d?ch v? h? tr? ngôn ng? mi?n phí dành cho b?n. G?i s? [phone].         [facility] complies with applicable Federal civil rights laws and does not discriminate on the basis of race, color, national origin, age, disability, or sex.

## 2017-05-02 ENCOUNTER — OFFICE VISIT (OUTPATIENT)
Dept: OPTOMETRY | Facility: CLINIC | Age: 32
End: 2017-05-02
Payer: COMMERCIAL

## 2017-05-02 DIAGNOSIS — Z46.0 ENCOUNTER FOR FITTING OR ADJUSTMENT OF SPECTACLES OR CONTACT LENSES: Primary | ICD-10-CM

## 2017-05-02 PROCEDURE — 99999 PR PBB SHADOW E&M-EST. PATIENT-LVL II: CPT | Mod: PBBFAC,,, | Performed by: OPTOMETRIST

## 2017-05-02 PROCEDURE — 99499 UNLISTED E&M SERVICE: CPT | Mod: S$GLB,,, | Performed by: OPTOMETRIST

## 2017-05-02 NOTE — MR AVS SNAPSHOT
[facility]  [address]  [facility] [address]  Phone: [phone]  Fax: [phone]                  Gautam Sotomayor   2017 4:30 PM   Office Visit    Description:  Female : 1985   Provider:  Jaylon Crisostomo OD   Department:  [facility]           Reason for Visit     Contact Lens Follow Up                To Do List           Future Appointments        Provider Department Dept Phone    5/15/2017 1:30 PM Skylar Aviles MD [facility] - OB/GYN [address] [phone]      Goals (5 Years of Data)     None      [facility] On Call     [facility] On Call Nurse Care Line -  Assistance  Unless otherwise directed by your provider, please contact [facility], our nurse care line that is available for  assistance.     Registered nurses in the [facility] On Call Center provide: appointment scheduling, clinical advisement, health education, and other advisory services.  Call: [phone] (toll free)               Medications           Message regarding Medications     Verify the changes and/or additions to your medication regime listed below are the same as discussed with your clinician today.  If any of these changes or additions are incorrect, please notify your healthcare provider.             Verify that the below list of medications is an accurate representation of the medications you are currently taking.  If none reported, the list may be blank. If incorrect, please contact your healthcare provider. Carry this list with you in case of emergency.           Current Medications     betamethasone valerate 0.1% (VALISONE) 0.1 % Lotn Apply topically 2 (two) times daily. qtts 3 AU Bid.    BIOTIN ORAL Take by mouth.    CETIRIZINE HCL (ZYRTEC ORAL) Take by mouth.    fluticasone (FLONASE) 50 mcg/actuation nasal spray 2 sprays by Each Nare route once daily.    folic acid (FOLVITE) 400 MCG tablet Take 400 mcg by mouth once daily.    ondansetron (ZOFRAN) 4 MG tablet Take 1  tablet (4 mg total) by mouth every 8 (eight) hours as needed.           Clinical Reference Information           Allergies as of 5/2/2017     Nickel      Immunizations Administered on Date of Encounter - 5/2/2017     None      Instructions    Prior diagnosis of sickle cell retinopathy bilaterally.  Followed by Dr. Wright in retina clinic.  Good CL fit with present Acuvue Oasys SCLs in both eyes.  Wearing CLs well in each eye, and showing need for only minor power change in each lens per over-refraction done today.  Rechecked post CL refraction, and results are more consistent with the results of the over-refraction done over CLs today.  New spectacle lens Rx issued, and patient advised to return to optical shop for lens make of both spectacle lenses.   New CL Rx issued with slight power change in each lens.  Return in one year, or prior if any problems noted in the interim.  Follow up in retina clinic as advised.        Smoking Cessation     If you would like to quit smoking:   You may be eligible for free services if you are a [place] resident and started smoking cigarettes before September 1, 1988.  Call the Smoking Cessation Trust ([facility]) toll free at (371) 651-6982 or (663) 291-7688.   Call 0-752-QUIT-NOW if you do not meet the above criteria.   Contact us via email: [email or web address]   View our website for more information: [email or web address]        Language Assistance Services     ATTENTION: Language assistance services are available, free of charge. Please call [phone].      ATENCIÓN: Si habla español, tiene a kwon disposición servicios gratuitos de asistencia lingüística. Llame al [phone].     CHÚ Ý: N?u b?n nói Ti?ng Vi?t, có các d?ch v? h? tr? ngôn ng? mi?n phí dành cho b?n. G?i s? [phone].         [facility] complies with applicable Federal civil rights laws and does not discriminate on the basis of race, color, national origin, age, disability, or sex.

## 2017-05-02 NOTE — PROGRESS NOTES
HPI     Contact Lens Follow Up    Additional comments: Patient in for recheck of CLFU, as previously   advised.  Wearing Acuvue Oasys 8.4   14.0  -2.75 OU            Comments   Patient in for contact lens follow-up.  See previous notes.     Contact lenses patient currently wearing:  Acuvue Oasys SCLs                                                                       OD     8.4   14.0   -2.75                                                                       OS     8.4   14.0   -2.75    Any problems with Contact Lens comfort?  no    Contact lens wearing time (hours/per day):  10 - 11 hours    How long have Contact Lenses been in today?  10 hours    Does patient sleep with the contact lenses in?  no         Last edited by Jaylon Crisostomo, OD on 5/2/2017  5:14 PM. (History)            Assessment /Plan     For exam results, see Encounter Report.    1. Encounter for fitting or adjustment of spectacles or contact lenses                Prior diagnosis of sickle cell retinopathy bilaterally.  Followed by Dr. Wright in retina clinic.  Good CL fit with present Acuvue Oasys SCLs in both eyes.  Wearing CLs well in each eye, and showing need for only minor power change in each lens per over-refraction done today.  Rechecked post CL refraction, and results are more consistent with the results of the over-refraction done over CLs today.  New spectacle lens Rx issued, and patient advised to return to optical shop for lens make of both spectacle lenses.   New CL Rx issued with slight power change in each lens.  Return in one year, or prior if any problems noted in the interim.  Follow up in retina clinic as advised.

## 2017-05-02 NOTE — PATIENT INSTRUCTIONS
Prior diagnosis of sickle cell retinopathy bilaterally.  Followed by Dr. Wright in retina clinic.  Good CL fit with present Acuvue Oasys SCLs in both eyes.  Wearing CLs well in each eye, and showing need for only minor power change in each lens per over-refraction done today.  Rechecked post CL refraction, and results are more consistent with the results of the over-refraction done over CLs today.  New spectacle lens Rx issued, and patient advised to return to optical shop for lens make of both spectacle lenses.   New CL Rx issued with slight power change in each lens.  Return in one year, or prior if any problems noted in the interim.  Follow up in retina clinic as advised.

## 2017-05-16 DIAGNOSIS — B00.1 HERPES LABIALIS: Primary | ICD-10-CM

## 2017-05-16 RX ORDER — VALACYCLOVIR HYDROCHLORIDE 1 G/1
TABLET, FILM COATED ORAL
Qty: 30 TABLET | Refills: 3 | Status: SHIPPED | OUTPATIENT
Start: 2017-05-16 | End: 2017-11-16

## 2017-08-10 ENCOUNTER — OFFICE VISIT (OUTPATIENT)
Dept: OBSTETRICS AND GYNECOLOGY | Facility: CLINIC | Age: 32
End: 2017-08-10
Payer: COMMERCIAL

## 2017-08-10 VITALS — SYSTOLIC BLOOD PRESSURE: 110 MMHG | BODY MASS INDEX: 22.89 KG/M2 | WEIGHT: 155 LBS | DIASTOLIC BLOOD PRESSURE: 70 MMHG

## 2017-08-10 DIAGNOSIS — D57.20 SICKLE CELL-HEMOGLOBIN C DISEASE WITHOUT CRISIS: Primary | ICD-10-CM

## 2017-08-10 DIAGNOSIS — Z01.419 WOMEN'S ANNUAL ROUTINE GYNECOLOGICAL EXAMINATION: ICD-10-CM

## 2017-08-10 DIAGNOSIS — Z11.3 SCREENING EXAMINATION FOR STD (SEXUALLY TRANSMITTED DISEASE): ICD-10-CM

## 2017-08-10 LAB
C TRACH DNA SPEC QL NAA+PROBE: NOT DETECTED
CANDIDA RRNA VAG QL PROBE: NEGATIVE
G VAGINALIS RRNA GENITAL QL PROBE: POSITIVE
N GONORRHOEA DNA SPEC QL NAA+PROBE: NOT DETECTED
T VAGINALIS RRNA GENITAL QL PROBE: NEGATIVE

## 2017-08-10 PROCEDURE — 87660 TRICHOMONAS VAGIN DIR PROBE: CPT

## 2017-08-10 PROCEDURE — 87624 HPV HI-RISK TYP POOLED RSLT: CPT

## 2017-08-10 PROCEDURE — 87480 CANDIDA DNA DIR PROBE: CPT

## 2017-08-10 PROCEDURE — 87591 N.GONORRHOEAE DNA AMP PROB: CPT

## 2017-08-10 PROCEDURE — 88142 CYTOPATH C/V THIN LAYER: CPT | Performed by: PATHOLOGY

## 2017-08-10 PROCEDURE — 88141 CYTOPATH C/V INTERPRET: CPT | Mod: ,,, | Performed by: PATHOLOGY

## 2017-08-10 PROCEDURE — 99999 PR PBB SHADOW E&M-EST. PATIENT-LVL II: CPT | Mod: PBBFAC,,, | Performed by: OBSTETRICS & GYNECOLOGY

## 2017-08-10 PROCEDURE — 99395 PREV VISIT EST AGE 18-39: CPT | Mod: S$GLB,,, | Performed by: OBSTETRICS & GYNECOLOGY

## 2017-08-10 NOTE — PROGRESS NOTES
HISTORY OF PRESENT ILLNESS:    Gautam Sotomayor is a 32 y.o. female  Patient's last menstrual period was 2016. presents today complaining of recurrent vaginal discharge.   History of ovarian cyst on last US. Patient desires STD testing.     Past Medical History:   Diagnosis Date    Abnormal Pap smear of cervix     colpo    Allergic rhinitis     Anemia     Genital herpes     Sickle cell disease, type SC        Past Surgical History:   Procedure Laterality Date    APPENDECTOMY      FOOT SURGERY Left     SPLENECTOMY, TOTAL      WISDOM TOOTH EXTRACTION         MEDICATIONS AND ALLERGIES:      Current Outpatient Prescriptions:     BIOTIN ORAL, Take by mouth., Disp: , Rfl:     CETIRIZINE HCL (ZYRTEC ORAL), Take by mouth., Disp: , Rfl:     fluticasone (FLONASE) 50 mcg/actuation nasal spray, 2 sprays by Each Nare route once daily., Disp: 16 g, Rfl: 0    folic acid (FOLVITE) 400 MCG tablet, Take 400 mcg by mouth once daily., Disp: , Rfl:     ondansetron (ZOFRAN) 4 MG tablet, Take 1 tablet (4 mg total) by mouth every 8 (eight) hours as needed., Disp: 12 tablet, Rfl: 0    valacyclovir (VALTREX) 1000 MG tablet, Take 2 pills po bid x 2 days prn first signs of outbreak, Disp: 30 tablet, Rfl: 3    betamethasone valerate 0.1% (VALISONE) 0.1 % Lotn, Apply topically 2 (two) times daily. qtts 3 AU Bid., Disp: 60 mL, Rfl: 2    metronidazole (METROGEL VAGINAL) 0.75 % vaginal gel, Place 1 applicator vaginally once daily. Use at bedtime, Disp: 1 g, Rfl: 0    Review of patient's allergies indicates:   Allergen Reactions    Nickel Rash       COMPREHENSIVE GYN HISTORY:  PAP History: Denies abnormal Paps.  Infection History: Denies STDs. Denies PID.  Benign History: Denies uterine fibroids. Denies ovarian cysts. Denies endometriosis. Denies other conditions.  Cancer History: Denies cervical cancer. Denies uterine cancer or hyperplasia. Denies ovarian cancer. Denies vulvar cancer or pre-cancer. Denies vaginal  cancer or pre-cancer. Denies breast cancer. Denies colon cancer.  Sexual Activity History: Reports currently being sexually active  Menstrual History: Every 28 days, flows for 4 days. Light flow.  Dysmenorrhea History: Denies dysmenorrhea.    ROS:  GENERAL: No fever or chills.  BREASTS: No pain. No lumps. No discharge.  ABDOMEN: No pain. No nausea. No vomiting. No diarrhea. No constipation.  REPRODUCTIVE: No abnormal bleeding.   VULVA: No pain. No lesions. No itching.  VAGINA: No relaxation. No itching. No odor. No discharge. No lesions.  URINARY: No incontinence. No nocturia. No frequency. No dysuria.    PE:  APPEARANCE: Well nourished, well developed, in no acute distress.  AFFECT: WNL, alert and oriented x 3.  ABDOMEN: Soft. No tenderness or masses. No hepatosplenomegaly. No hernias.  BREASTS: Symmetrical, no skin changes or visible lesions. No palpable masses, nipple discharge bilaterally.  PELVIC: Normal external female genitalia without lesions. Normal hair distribution. Adequate perineal body, normal urethral meatus. Vagina pink and well rugated without lesions or discharge. Cervix pink without lesions, discharge or tenderness. No significant cystocele or rectocele. Bimanual exam shows uterus to be 4-6 weeks size, regular, mobile and nontender. Adnexa without masses or tenderness.      1. 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 Screening examination for STD (sexually transmitted disease)    3. Women's annual routine gynecological examination        PLAN:    Orders Placed This Encounter    Vaginosis Screen by DNA Probe    C. trachomatis/N. gonorrhoeae by AMP DNA Cervix    HPV High Risk Genotypes, PCR    HIV-1 and HIV-2 antibodies    RPR    Hepatitis panel, acute    Liquid-based pap smear, screening       FOLLOW-UP with me pending test results.

## 2017-08-15 ENCOUNTER — PATIENT MESSAGE (OUTPATIENT)
Dept: OBSTETRICS AND GYNECOLOGY | Facility: CLINIC | Age: 32
End: 2017-08-15

## 2017-08-15 ENCOUNTER — TELEPHONE (OUTPATIENT)
Dept: OBSTETRICS AND GYNECOLOGY | Facility: CLINIC | Age: 32
End: 2017-08-15

## 2017-08-15 NOTE — TELEPHONE ENCOUNTER
----- Message from Tonia Bosch sent at 8/15/2017  2:33 PM CDT -----  Contact: YARI MUELLER [[id number]]  _x  1st Request  _  2nd Request  _  3rd Request        Who: YARI MUELLER [[id number]]    Why: patient states she would like a call back in regards to getting a pelvis U/S scheduled from her last visit on 8/10. (patient has an order from 1/12 but unsure if that can ne scheduled) please advise    What Number to Call Back: [phone]    When to Expect a call back: (Before the end of the day)   -- if call after 3:00 call back will be tomorrow.

## 2017-08-17 ENCOUNTER — TELEPHONE (OUTPATIENT)
Dept: OBSTETRICS AND GYNECOLOGY | Facility: CLINIC | Age: 32
End: 2017-08-17

## 2017-08-17 DIAGNOSIS — N76.0 BV (BACTERIAL VAGINOSIS): Primary | ICD-10-CM

## 2017-08-17 DIAGNOSIS — B96.89 BV (BACTERIAL VAGINOSIS): Primary | ICD-10-CM

## 2017-08-17 LAB
HPV HR 12 DNA CVX QL NAA+PROBE: POSITIVE
HPV16 DNA SPEC QL NAA+PROBE: NEGATIVE
HPV18 DNA SPEC QL NAA+PROBE: NEGATIVE

## 2017-08-17 RX ORDER — METRONIDAZOLE 7.5 MG/G
1 GEL VAGINAL DAILY
Qty: 1 G | Refills: 0 | Status: SHIPPED | OUTPATIENT
Start: 2017-08-17 | End: 2017-08-24

## 2017-08-22 ENCOUNTER — TELEPHONE (OUTPATIENT)
Dept: OBSTETRICS AND GYNECOLOGY | Facility: CLINIC | Age: 32
End: 2017-08-22

## 2017-08-22 ENCOUNTER — HOSPITAL ENCOUNTER (OUTPATIENT)
Dept: RADIOLOGY | Facility: OTHER | Age: 32
Discharge: HOME | End: 2017-08-22
Attending: OBSTETRICS & GYNECOLOGY
Payer: COMMERCIAL

## 2017-08-22 DIAGNOSIS — B96.89 BV (BACTERIAL VAGINOSIS): Primary | ICD-10-CM

## 2017-08-22 DIAGNOSIS — N83.202 LEFT OVARIAN CYST: ICD-10-CM

## 2017-08-22 DIAGNOSIS — N76.0 BV (BACTERIAL VAGINOSIS): Primary | ICD-10-CM

## 2017-08-22 PROCEDURE — 76856 US EXAM PELVIC COMPLETE: CPT | Mod: 26,,, | Performed by: RADIOLOGY

## 2017-08-22 PROCEDURE — 76856 US EXAM PELVIC COMPLETE: CPT | Mod: TC

## 2017-08-22 PROCEDURE — 76830 TRANSVAGINAL US NON-OB: CPT | Mod: 26,,, | Performed by: RADIOLOGY

## 2017-08-22 RX ORDER — METRONIDAZOLE 7.5 MG/G
1 GEL VAGINAL DAILY
Qty: 1 G | Refills: 0 | Status: SHIPPED | OUTPATIENT
Start: 2017-08-22 | End: 2017-08-29

## 2017-08-22 NOTE — TELEPHONE ENCOUNTER
Schedule colposcopy  Patient needs to have colposcopy done.  ASCUS Pap, HR HPV positive     Once a year we do pap smears to screen for cervical cancer for all women. The results usually take a couple of weeks to return. The pap smear samples some of the cells from the cervix and on your pap smear some of the cells were abnormal. Sometimes this can reflect an abnormality or dysplasia on the cervix. When the pap is abnormal, we schedule the patient to have a colposcopy. This procedure looks at the cervix with a microscope more closely and samples any abnormal looking areas. These biopsies are then sent to the pathology lab for close inspection. Most of the time patients with abnormal pap smears have been exposed to HPV in the past during sexual intercourse. We are not able to tell when this exposure occurred. The changes of dysplasia on the cervix can occur slowly over time, so we can schedule your appointment at some time within the next 4 weeks when you are not on your menstrual cycle.  Dr. Aviles will discuss all of this further with you at the time of your appointment.

## 2017-08-25 PROBLEM — N83.209 OVARIAN CYST: Status: ACTIVE | Noted: 2017-08-25

## 2017-08-25 NOTE — TELEPHONE ENCOUNTER
I left a message for the pt to call the office back to inform her that she has to have a colpo done and it was schedule

## 2017-08-25 NOTE — TELEPHONE ENCOUNTER
----- Message from Tonia Bosch sent at 8/25/2017  9:46 AM CDT -----  Contact: YARI MUELLER [[id number]]  _  1st Request  _  2nd Request  _  3rd Request        Who: YARI MUELLER [[id number]]    Why: patient is returning a call    What Number to Call Back: [phone]    When to Expect a call back: (Before the end of the day)   -- if call after 3:00 call back will be tomorrow.

## 2017-08-28 ENCOUNTER — PATIENT MESSAGE (OUTPATIENT)
Dept: OBSTETRICS AND GYNECOLOGY | Facility: CLINIC | Age: 32
End: 2017-08-28

## 2017-09-06 NOTE — TELEPHONE ENCOUNTER
----- Message from Onelia Pruett sent at 9/6/2017  9:51 AM CDT -----  Contact: self    Who Called: YARI MUELLER [[id number]]  Date of Positive Preg Test: September 5th 1st day of Last Menstrual Cycle: July 28 th    List Any Difficulties: none    What Number to Call Back:[phone]  Pt should expect a return call by the end of the day.

## 2017-09-06 NOTE — TELEPHONE ENCOUNTER
Pt was scheduled for a colpo and then called back and stated that she was pregnant. Pt apt was changed from a colpo to a new ob visit.

## 2017-09-18 ENCOUNTER — OFFICE VISIT (OUTPATIENT)
Dept: OBSTETRICS AND GYNECOLOGY | Facility: CLINIC | Age: 32
End: 2017-09-18
Payer: COMMERCIAL

## 2017-09-18 ENCOUNTER — LAB VISIT (OUTPATIENT)
Dept: LAB | Facility: OTHER | Age: 32
End: 2017-09-18
Attending: NURSE PRACTITIONER
Payer: COMMERCIAL

## 2017-09-18 VITALS
SYSTOLIC BLOOD PRESSURE: 118 MMHG | HEIGHT: 69 IN | DIASTOLIC BLOOD PRESSURE: 60 MMHG | WEIGHT: 158.75 LBS | BODY MASS INDEX: 23.51 KG/M2

## 2017-09-18 DIAGNOSIS — D57.20 SICKLE CELL-HEMOGLOBIN C DISEASE WITHOUT CRISIS: ICD-10-CM

## 2017-09-18 DIAGNOSIS — Z90.81 POST-SPLENECTOMY: ICD-10-CM

## 2017-09-18 DIAGNOSIS — N91.2 AMENORRHEA: Primary | ICD-10-CM

## 2017-09-18 DIAGNOSIS — O36.80X0 PREGNANCY WITH UNCERTAIN FETAL VIABILITY, NOT APPLICABLE OR UNSPECIFIED FETUS: ICD-10-CM

## 2017-09-18 DIAGNOSIS — Z34.01 PREGNANCY, FIRST, FIRST TRIMESTER: ICD-10-CM

## 2017-09-18 DIAGNOSIS — E04.1 THYROID NODULE: ICD-10-CM

## 2017-09-18 DIAGNOSIS — H36.89 SICKLE CELL RETINOPATHY WITHOUT CRISIS: ICD-10-CM

## 2017-09-18 DIAGNOSIS — O21.9 NAUSEA AND VOMITING IN PREGNANCY: ICD-10-CM

## 2017-09-18 DIAGNOSIS — K80.20 GALLSTONES: ICD-10-CM

## 2017-09-18 DIAGNOSIS — D57.1 SICKLE CELL RETINOPATHY WITHOUT CRISIS: ICD-10-CM

## 2017-09-18 DIAGNOSIS — B00.9 HSV-2 INFECTION COMPLICATING PREGNANCY, FIRST TRIMESTER: ICD-10-CM

## 2017-09-18 DIAGNOSIS — O98.511 HSV-2 INFECTION COMPLICATING PREGNANCY, FIRST TRIMESTER: ICD-10-CM

## 2017-09-18 PROBLEM — O98.519 HSV-2 INFECTION COMPLICATING PREGNANCY: Status: ACTIVE | Noted: 2017-09-18

## 2017-09-18 PROBLEM — Z34.00 PRENATAL CARE, FIRST PREGNANCY: Status: ACTIVE | Noted: 2017-09-18

## 2017-09-18 LAB
ABO + RH BLD: NORMAL
BILIRUB UR QL STRIP: NEGATIVE
BLD GP AB SCN CELLS X3 SERPL QL: NORMAL
CLARITY UR REFRACT.AUTO: CLEAR
COLOR UR AUTO: YELLOW
GLUCOSE UR QL STRIP: NEGATIVE
HGB UR QL STRIP: NEGATIVE
KETONES UR QL STRIP: NEGATIVE
LEUKOCYTE ESTERASE UR QL STRIP: NEGATIVE
NITRITE UR QL STRIP: NEGATIVE
PH UR STRIP: 6 [PH] (ref 5–8)
PROT UR QL STRIP: NEGATIVE
SP GR UR STRIP: 1.01 (ref 1–1.03)
TSH SERPL DL<=0.005 MIU/L-ACNC: 0.41 UIU/ML
URN SPEC COLLECT METH UR: NORMAL
UROBILINOGEN UR STRIP-ACNC: NEGATIVE EU/DL

## 2017-09-18 PROCEDURE — 87340 HEPATITIS B SURFACE AG IA: CPT

## 2017-09-18 PROCEDURE — 87086 URINE CULTURE/COLONY COUNT: CPT

## 2017-09-18 PROCEDURE — 86762 RUBELLA ANTIBODY: CPT

## 2017-09-18 PROCEDURE — 84443 ASSAY THYROID STIM HORMONE: CPT

## 2017-09-18 PROCEDURE — 83020 HEMOGLOBIN ELECTROPHORESIS: CPT

## 2017-09-18 PROCEDURE — 83021 HEMOGLOBIN CHROMOTOGRAPHY: CPT

## 2017-09-18 PROCEDURE — 99214 OFFICE O/P EST MOD 30 MIN: CPT | Mod: S$GLB,,, | Performed by: NURSE PRACTITIONER

## 2017-09-18 PROCEDURE — 99999 PR PBB SHADOW E&M-EST. PATIENT-LVL III: CPT | Mod: PBBFAC,,, | Performed by: NURSE PRACTITIONER

## 2017-09-18 PROCEDURE — 86900 BLOOD TYPING SEROLOGIC ABO: CPT

## 2017-09-18 PROCEDURE — 36415 COLL VENOUS BLD VENIPUNCTURE: CPT

## 2017-09-18 PROCEDURE — 87591 N.GONORRHOEAE DNA AMP PROB: CPT

## 2017-09-18 PROCEDURE — 81003 URINALYSIS AUTO W/O SCOPE: CPT

## 2017-09-18 PROCEDURE — 86850 RBC ANTIBODY SCREEN: CPT

## 2017-09-18 RX ORDER — ONDANSETRON 4 MG/1
4 TABLET, FILM COATED ORAL DAILY PRN
Qty: 30 TABLET | Refills: 1 | Status: SHIPPED | OUTPATIENT
Start: 2017-09-18 | End: 2018-09-18

## 2017-09-18 NOTE — PROGRESS NOTES
"HISTORY OF PRESENT ILLNESS:    Gautam Sotomayor is a 32 y.o. female, ,  Patient's last menstrual period was 2017.  for a routine exam complaining of amenorrhea.    -Has mild nausea, no vomiting and requests Zofran.  -Denies cramping, bleeding.   -Worried because "years ago" was told she needed gall bladder surgery, but never followed throught.   -Also has a thyroid nodule "they are following" and history of HSV - takes episodic Valtrex.      Past Medical History:   Diagnosis Date    Abnormal Pap smear of cervix , 2017    colpo    Allergic rhinitis     Anemia     Genital herpes     Sickle cell disease, type SC        Past Surgical History:   Procedure Laterality Date    APPENDECTOMY      FOOT SURGERY Left     SPLENECTOMY, TOTAL      WISDOM TOOTH EXTRACTION         MEDICATIONS AND ALLERGIES:      Current Outpatient Prescriptions:     BIOTIN ORAL, Take by mouth., Disp: , Rfl:     CETIRIZINE HCL (ZYRTEC ORAL), Take by mouth., Disp: , Rfl:     fluticasone (FLONASE) 50 mcg/actuation nasal spray, 2 sprays by Each Nare route once daily., Disp: 16 g, Rfl: 0    folic acid (FOLVITE) 400 MCG tablet, Take 400 mcg by mouth once daily., Disp: , Rfl:     valacyclovir (VALTREX) 1000 MG tablet, Take 2 pills po bid x 2 days prn first signs of outbreak, Disp: 30 tablet, Rfl: 3    betamethasone valerate 0.1% (VALISONE) 0.1 % Lotn, Apply topically 2 (two) times daily. qtts 3 AU Bid., Disp: 60 mL, Rfl: 2    ondansetron (ZOFRAN, AS HYDROCHLORIDE,) 4 MG tablet, Take 1 tablet (4 mg total) by mouth daily as needed for Nausea., Disp: 30 tablet, Rfl: 1    Review of patient's allergies indicates:   Allergen Reactions    Nickel Rash       Family History   Problem Relation Age of Onset    Breast cancer Neg Hx     Colon cancer Neg Hx     Ovarian cancer Neg Hx        Social History     Social History    Marital status: Single     Spouse name: N/A    Number of children: N/A    Years of education: N/A " "    Occupational History    Not on file.     Social History Main Topics    Smoking status: Current Every Day Smoker     Packs/day: 0.25     Years: 10.00    Smokeless tobacco: Never Used    Alcohol use 0.6 oz/week     1 Cans of beer per week      Comment: socially    Drug use: No    Sexual activity: Yes     Partners: Male     Birth control/ protection: None, Condom     Other Topics Concern    Not on file     Social History Narrative    No narrative on file       OB HISTORY: None.     COMPREHENSIVE GYN HISTORY:  PAP History: Reports abnormal Pap: 2016 Treatment: Colposcopy. LAST PAP 8-10-17  ASCUS + HRHPV  Infection History: Reports STDs: HPV. Denies PID.  Benign History: Denies uterine fibroids. Denies ovarian cysts. Denies endometriosis. Denies other conditions.  Cancer History: Denies cervical cancer. Denies uterine cancer or hyperplasia. Denies ovarian cancer. Denies vulvar cancer or pre-cancer. Denies vaginal cancer or pre-cancer. Denies breast cancer. Denies colon cancer.  Sexual Activity History: Reports currently being sexually active  Menstrual History: None.  Contraception: None    ROS:  GENERAL: No weight changes. No swelling. No fatigue. No fever.  CARDIOVASCULAR: No chest pain. No shortness of breath. No leg cramps.   NEUROLOGICAL: No headaches. No vision changes.  BREASTS: No pain. No lumps. No discharge.  ABDOMEN: No pain. + NAUSEA. No vomiting. No diarrhea. No constipation.  REPRODUCTIVE: No abnormal bleeding.   VULVA: No pain. No lesions. No itching.  VAGINA: No relaxation. No itching. No odor. No discharge. No lesions.  URINARY: No incontinence. No nocturia. No frequency. No dysuria.    /60   Ht 5' 9" (1.753 m)   Wt 72 kg (158 lb 11.7 oz)   LMP 07/28/2017   BMI 23.44 kg/m²     PE:  AFFECT: Alert and oriented X 3.  GENERAL: Appearance well-nourished, well-developed, in no acute distress.  HEENT: PERRL, Anicteric Sclera.  TEETH: Good dentition.  THYROID: No thyromegally or " nodules.  BREASTS: No masses, skin changes, nipple discharge or adenopathy bilaterally.  LUNGS: Clear to auscultation bilaterally, no wheezes or rhonchi.  HEART: Regular rate and rhythm without murmurs, gallops or rubs.  ABDOMEN: Soft and nontender without masses or organomegally.  EXTREMITIES: No cyanosis, clubbing or edema. No calf tenderness.  SKIN: No lesions or rashes. MIDLINE VERTICAL SCAR FROM SPLENECTOMY.  LYMPH NODES: No axillary, cervical or inguinal adenopathy.  VULVA: No lesions, masses or tenderness.  VAGINA: Moist and well rugated without lesions or discharge.  CERVIX: Moist and pink without lesions, discharge or tenderness.      UTERUS SIZE: 6 - 8 week size, nontender and without masses.  ADNEXA: No masses or tenderness.  RECTUM: Deferred.  ESTIMATE OF PELVIC CAPACITY: Adequate    PROCEDURES:  UPT Positive    DIAGNOSIS:  1. Amenorrhea    2. Pregnancy, first, first trimester    3. Pregnancy with uncertain fetal viability, not applicable or unspecified fetus    4. Nausea and vomiting in pregnancy    5. 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 Post-splenectomy    8. Gallstones    9. Thyroid nodule    10. HSV-2 infection complicating pregnancy, first trimester        PLAN:    Orders Placed This Encounter    Urine culture    C. trachomatis/N. gonorrhoeae by AMP DNA Cervix    Hepatitis B surface antigen    Rubella antibody, IgG    Urinalysis    TSH    Hemoglobin Electrophoresis,Hgb A2 [place].    POCT Urine Pregnancy    Type & Screen    ondansetron (ZOFRAN, AS HYDROCHLORIDE,) 4 MG tablet    US OB/GYN Procedure ([facility])-Future   WANTS SEQUENTIAL SCREENING - WILL SCHEDULE AFTER DATING TVS      1st TRIMESTER COUNSELING:  Common complaints of pregnancy  HIV and other routine prenatal tests including  genetic screening  Risk factors identified by prenatal history  Anticipated course of prenatal care  Nutrition and weight gain  counseling  Toxoplasmosis precautions (Cats/Raw Meat)  Sexual activity and exercise  Environmental/Work hazards  Travel  Tobacco (Ask, Advise, Assess, Assist, and Arrange), as well as alcohol and drug use  Use of any medications (Including supplements, Vitamins, Herbs, or OTC Drugs)  Indications for Ultrasound  Domestic violence  Seat belt use  Childbirth classes/Hospital facilities   Chronic disease in pregnancy (thyroid nodule, gallstones)  HSV in pregnancy    TERATOLOGY COUNSELING:   Neural tube effect (Meningomyelocele, Spina Bifida, or Anencephaly)  Down Syndrome  Sickle Cell Disease or Trait ()    FOLLOW-UP for a dating TVS and a New Ob Visit in 4 weeks with Dr Katz.    ADDENDUM:  Indication  ========  Estimation of gestational age.    History  ======  General History  Other: BONY KATZ  Previous Outcomes   1  Para 0    Method  ======  Transabdominal ultrasound examination. View: Good view.    Pregnancy  =========  [facility] pregnancy. Number of fetuses: 1.    Dating  ======  LMP on: 2017  GA by LMP 10 w + 4 d  KAYLEE by LMP: 2018  Ultrasound examination on: 10/10/2017  GA by U/S based upon: CRL  GA by U/S 11 w + 5 d  KAYLEE by U/S: 2018  Assigned: Dating performed on 10/10/2017, based on ultrasound (CRL)  Assigned GA 11 w + 5 d  Assigned KAYLEE: 2018    General Evaluation  ===============  Cardiac activity: present.  Placenta: anterior.  Cord vessels: 3 vessel cord.  Amniotic fluid: normal amount.    Fetal Biometry  ===========  CRL 49.9 mm 11w 5d Hadlock   bpm    Fetal Anatomy  ===========  The following structures appear normal:  Cranium. GI tract.  The following structures were visualized:  Urogenital tract. Arms. Legs.    Maternal Structures  ===============  Ovaries / Tubes / Adnexa  Rt ovary D1 4.2 cm  Rt ovary D2 2.3 cm  Rt ovary D3 2.3 cm  Rt ovary mean 2.9 cm  Rt ovary vol 11.6 cmÂ³  Rt ovarian corpus luteum: visualized  Rt ovarian corpus luteum D1 18.0 mm  Rt  ovarian corpus luteum D2 15.0 mm  Rt ovarian corpus luteum D3 16.0 mm  Lt ovary: Visualized  Lt ovary D1 3.7 cm  Lt ovary D2 3.0 cm  Lt ovary D3 1.5 cm  Lt ovary mean 2.7 cm  Lt ovary vol 8.8 cmÂ³  Pouch of Ron / Other Structures  Free fluid: No free fluid visualized    Impression  =========  [facility]son living IUP, not consistent with menstrual dating. KAYLEE reassigned accordingly.                                                      Sonographer: Madalyn Clay  Electronically Signed by: Monica Yoo at 10/10/2017-15:53    SEQUENTIAL SCREENING ORDER PLACED 10-10-17 GH

## 2017-09-19 ENCOUNTER — TELEPHONE (OUTPATIENT)
Dept: OBSTETRICS AND GYNECOLOGY | Facility: CLINIC | Age: 32
End: 2017-09-19

## 2017-09-19 LAB
BACTERIA UR CULT: NO GROWTH
C TRACH DNA SPEC QL NAA+PROBE: NOT DETECTED
HBV SURFACE AG SERPL QL IA: NEGATIVE
N GONORRHOEA DNA SPEC QL NAA+PROBE: NOT DETECTED
RUBV IGG SER-ACNC: 59.4 IU/ML
RUBV IGG SER-IMP: REACTIVE

## 2017-09-19 NOTE — TELEPHONE ENCOUNTER
----- Message from Daniel Prieto sent at 9/19/2017  1:33 PM CDT -----  Ob pt needs to schedule a appt in 4 wks [phone]

## 2017-09-21 LAB
HGB A2 MFR BLD HPLC: 3.2 %
HGB FRACT BLD ELPH-IMP: NORMAL
HGB FRACT BLD ELPH-IMP: NORMAL

## 2017-10-10 ENCOUNTER — PROCEDURE VISIT (OUTPATIENT)
Dept: OBSTETRICS AND GYNECOLOGY | Facility: CLINIC | Age: 32
End: 2017-10-10
Payer: COMMERCIAL

## 2017-10-10 DIAGNOSIS — Z34.01 ENCOUNTER FOR PRENATAL CARE IN FIRST TRIMESTER OF FIRST PREGNANCY: Primary | ICD-10-CM

## 2017-10-10 DIAGNOSIS — N91.2 AMENORRHEA: ICD-10-CM

## 2017-10-10 DIAGNOSIS — Z36.89 ESTABLISH GESTATIONAL AGE, ULTRASOUND: ICD-10-CM

## 2017-10-10 DIAGNOSIS — O36.80X0 PREGNANCY WITH UNCERTAIN FETAL VIABILITY, NOT APPLICABLE OR UNSPECIFIED FETUS: ICD-10-CM

## 2017-10-10 PROCEDURE — 76801 OB US < 14 WKS SINGLE FETUS: CPT | Mod: S$GLB,,, | Performed by: OBSTETRICS & GYNECOLOGY

## 2017-10-16 ENCOUNTER — INITIAL PRENATAL (OUTPATIENT)
Dept: OBSTETRICS AND GYNECOLOGY | Facility: CLINIC | Age: 32
End: 2017-10-16
Payer: COMMERCIAL

## 2017-10-16 VITALS
BODY MASS INDEX: 22.79 KG/M2 | SYSTOLIC BLOOD PRESSURE: 116 MMHG | DIASTOLIC BLOOD PRESSURE: 58 MMHG | WEIGHT: 154.31 LBS

## 2017-10-16 DIAGNOSIS — D57.3 SICKLE CELL TRAIT: ICD-10-CM

## 2017-10-16 DIAGNOSIS — O98.511 HERPES SIMPLEX VIRUS TYPE 2 (HSV-2) INFECTION AFFECTING PREGNANCY IN FIRST TRIMESTER: ICD-10-CM

## 2017-10-16 DIAGNOSIS — Z34.01 ENCOUNTER FOR PRENATAL CARE IN FIRST TRIMESTER OF FIRST PREGNANCY: Primary | ICD-10-CM

## 2017-10-16 DIAGNOSIS — D57.20 SICKLE CELL-HEMOGLOBIN C DISEASE WITHOUT CRISIS: ICD-10-CM

## 2017-10-16 DIAGNOSIS — B00.9 HERPES SIMPLEX VIRUS TYPE 2 (HSV-2) INFECTION AFFECTING PREGNANCY IN FIRST TRIMESTER: ICD-10-CM

## 2017-10-16 DIAGNOSIS — Z3A.12 12 WEEKS GESTATION OF PREGNANCY: ICD-10-CM

## 2017-10-16 PROCEDURE — 99999 PR PBB SHADOW E&M-EST. PATIENT-LVL II: CPT | Mod: PBBFAC,,, | Performed by: NURSE PRACTITIONER

## 2017-10-16 PROCEDURE — 0502F SUBSEQUENT PRENATAL CARE: CPT | Mod: S$GLB,,, | Performed by: NURSE PRACTITIONER

## 2017-10-16 PROCEDURE — 90686 IIV4 VACC NO PRSV 0.5 ML IM: CPT | Mod: S$GLB,,, | Performed by: NURSE PRACTITIONER

## 2017-10-16 PROCEDURE — 90471 IMMUNIZATION ADMIN: CPT | Mod: S$GLB,,, | Performed by: NURSE PRACTITIONER

## 2017-10-16 NOTE — PROGRESS NOTES
"Concerned about wt loss; Has URI sx resolving with OTC Sudafed, Robitussin and "as a result, appetite is poor";   Denies n/v, diarrhea; Discussed diet, wt loss (not greater than 10% of body weight) and second trimester; To add Boost and small freqeuent hi protein snacks; During URI had nose bleeds and none since; Gags with brushing teeth and to get soft toothbrush; Declined genetic screening; States [facility] has called her about her anatomy US and she hasn't called them back; Flu vaccine today; F/U 4 weeks for visit.  "

## 2017-10-18 ENCOUNTER — PATIENT MESSAGE (OUTPATIENT)
Dept: OBSTETRICS AND GYNECOLOGY | Facility: CLINIC | Age: 32
End: 2017-10-18

## 2017-10-18 ENCOUNTER — TELEPHONE (OUTPATIENT)
Dept: MATERNAL FETAL MEDICINE | Facility: CLINIC | Age: 32
End: 2017-10-18

## 2017-10-18 NOTE — TELEPHONE ENCOUNTER
Patient was called to schedule her NT on October 11 and we had to leave her a message.  Patient called again today to see if she could come in today for for NT and she declined because of work.  I offered the patient Monday but explained to her if the baby measured out of range she may still not be able to do the screen.  Patient upset because she was not given enough options or time to set this test up.  Patient informed that  could do the Quad screen between 15-21.  Patient verbalized her understanding.

## 2017-10-19 ENCOUNTER — TELEPHONE (OUTPATIENT)
Dept: OBSTETRICS AND GYNECOLOGY | Facility: CLINIC | Age: 32
End: 2017-10-19

## 2017-10-19 NOTE — TELEPHONE ENCOUNTER
----- Message from Daniel Prieto sent at 10/17/2017  2:47 PM CDT -----  PLEASE CALL OB PT REGARDING HER FLU INJECTION [phone]

## 2017-11-16 ENCOUNTER — ROUTINE PRENATAL (OUTPATIENT)
Dept: OBSTETRICS AND GYNECOLOGY | Facility: CLINIC | Age: 32
End: 2017-11-16
Payer: COMMERCIAL

## 2017-11-16 ENCOUNTER — LAB VISIT (OUTPATIENT)
Dept: LAB | Facility: HOSPITAL | Age: 32
End: 2017-11-16
Attending: OBSTETRICS & GYNECOLOGY
Payer: COMMERCIAL

## 2017-11-16 VITALS — WEIGHT: 154 LBS | SYSTOLIC BLOOD PRESSURE: 100 MMHG | BODY MASS INDEX: 22.74 KG/M2 | DIASTOLIC BLOOD PRESSURE: 60 MMHG

## 2017-11-16 DIAGNOSIS — Z34.02 ENCOUNTER FOR PRENATAL CARE IN SECOND TRIMESTER OF FIRST PREGNANCY: ICD-10-CM

## 2017-11-16 DIAGNOSIS — Z34.02 ENCOUNTER FOR SUPERVISION OF NORMAL FIRST PREGNANCY IN SECOND TRIMESTER: ICD-10-CM

## 2017-11-16 DIAGNOSIS — D57.20 SICKLE CELL-HEMOGLOBIN C DISEASE WITHOUT CRISIS: Primary | ICD-10-CM

## 2017-11-16 DIAGNOSIS — D57.20 SICKLE CELL-HEMOGLOBIN C DISEASE WITHOUT CRISIS: ICD-10-CM

## 2017-11-16 DIAGNOSIS — Z11.3 SCREENING EXAMINATION FOR STD (SEXUALLY TRANSMITTED DISEASE): ICD-10-CM

## 2017-11-16 LAB
ALBUMIN SERPL BCP-MCNC: 3.5 G/DL
ALP SERPL-CCNC: 61 U/L
ALT SERPL W/O P-5'-P-CCNC: 10 U/L
ANION GAP SERPL CALC-SCNC: 8 MMOL/L
AST SERPL-CCNC: 16 U/L
BILIRUB SERPL-MCNC: 0.6 MG/DL
BUN SERPL-MCNC: 4 MG/DL
CALCIUM SERPL-MCNC: 9.4 MG/DL
CHLORIDE SERPL-SCNC: 104 MMOL/L
CO2 SERPL-SCNC: 25 MMOL/L
CREAT SERPL-MCNC: 0.7 MG/DL
EST. GFR  (AFRICAN AMERICAN): >60 ML/MIN/1.73 M^2
EST. GFR  (NON AFRICAN AMERICAN): >60 ML/MIN/1.73 M^2
GLUCOSE SERPL-MCNC: 73 MG/DL
POTASSIUM SERPL-SCNC: 3.9 MMOL/L
PROT SERPL-MCNC: 7.3 G/DL
SODIUM SERPL-SCNC: 137 MMOL/L
URATE SERPL-MCNC: 4.6 MG/DL

## 2017-11-16 PROCEDURE — 86703 HIV-1/HIV-2 1 RESULT ANTBDY: CPT

## 2017-11-16 PROCEDURE — 0502F SUBSEQUENT PRENATAL CARE: CPT | Mod: S$GLB,,, | Performed by: OBSTETRICS & GYNECOLOGY

## 2017-11-16 PROCEDURE — 86592 SYPHILIS TEST NON-TREP QUAL: CPT

## 2017-11-16 PROCEDURE — 36415 COLL VENOUS BLD VENIPUNCTURE: CPT

## 2017-11-16 PROCEDURE — 80074 ACUTE HEPATITIS PANEL: CPT

## 2017-11-16 PROCEDURE — 82570 ASSAY OF URINE CREATININE: CPT

## 2017-11-16 PROCEDURE — 84550 ASSAY OF BLOOD/URIC ACID: CPT

## 2017-11-16 PROCEDURE — 99999 PR PBB SHADOW E&M-EST. PATIENT-LVL III: CPT | Mod: PBBFAC,,, | Performed by: OBSTETRICS & GYNECOLOGY

## 2017-11-16 PROCEDURE — 81220 CFTR GENE COM VARIANTS: CPT

## 2017-11-16 PROCEDURE — 80053 COMPREHEN METABOLIC PANEL: CPT

## 2017-11-16 PROCEDURE — 81511 FTL CGEN ABNOR FOUR ANAL: CPT

## 2017-11-16 RX ORDER — METRONIDAZOLE 7.5 MG/G
GEL VAGINAL
Refills: 0 | COMMUNITY
Start: 2017-11-09 | End: 2017-11-16

## 2017-11-17 ENCOUNTER — TELEPHONE (OUTPATIENT)
Dept: OBSTETRICS AND GYNECOLOGY | Facility: CLINIC | Age: 32
End: 2017-11-17

## 2017-11-17 LAB
CREAT UR-MCNC: 107.8 MG/DL
HAV IGM SERPL QL IA: NEGATIVE
HBV CORE IGM SERPL QL IA: NEGATIVE
HBV SURFACE AG SERPL QL IA: NEGATIVE
HCV AB SERPL QL IA: NEGATIVE
HIV 1+2 AB+HIV1 P24 AG SERPL QL IA: NEGATIVE
PROT UR-MCNC: <7 MG/DL
PROT/CREAT RATIO, UR: NORMAL
RPR SER QL: NORMAL

## 2017-11-17 NOTE — TELEPHONE ENCOUNTER
----- Message from Michelle Navas sent at 11/17/2017 10:11 AM CST -----  x_  1st Request  _  2nd Request  _  3rd Request        Who: [facility]    Why: cinda stating off needs to complete collection tab for pt.lab. Please call to discuss    What Number to Call Back:[phone]    When to Expect a call back: (Before the end of the day)   -- if the call is after 12:00, the call back will be tomorrow.

## 2017-11-20 LAB
# FETUSES US: ABNORMAL
2ND TRIMESTER 4 SCREEN PNL SERPL: POSITIVE
2ND TRIMESTER 4 SCREEN SERPL-IMP: ABNORMAL
AFP MOM SERPL: 0.46
AFP SERPL-MCNC: 19.9 NG/ML
AGE AT DELIVERY: 32
B-HCG MOM SERPL: 2.28
B-HCG SERPL-ACNC: 71 IU/ML
CFTR MUT ANL BLD/T: NORMAL
FET TS 21 RISK FROM MAT AGE: ABNORMAL
GA (DAYS): 0 D
GA (WEEKS): 17 WK
GA METHOD: ABNORMAL
IDDM PATIENT QL: ABNORMAL
INHIBIN A MOM SERPL: 1.08
INHIBIN A SERPL-MCNC: 168.4 PG/ML
SMOKING STATUS FTND: NO
TS 18 RISK FETUS: ABNORMAL
TS 21 RISK FETUS: ABNORMAL
U ESTRIOL MOM SERPL: 0.71
U ESTRIOL SERPL-MCNC: 0.71 NG/ML

## 2017-11-20 NOTE — PROGRESS NOTES
Reviewed labs. Discussed Hemoglobin SC disease in detail. Partner to have electrophoresis done here.

## 2017-11-30 ENCOUNTER — TELEPHONE (OUTPATIENT)
Dept: OBSTETRICS AND GYNECOLOGY | Facility: CLINIC | Age: 32
End: 2017-11-30

## 2017-11-30 DIAGNOSIS — Z34.02 ENCOUNTER FOR SUPERVISION OF LOW-RISK FIRST PREGNANCY IN SECOND TRIMESTER: ICD-10-CM

## 2017-11-30 DIAGNOSIS — O28.0 ABNORMAL QUAD SCREEN: Primary | ICD-10-CM

## 2017-11-30 NOTE — TELEPHONE ENCOUNTER
I spoke to the pt and gave her the resutls of her quad screen and informed her that she will be getting a call for a second screen with the [facility] department .

## 2017-11-30 NOTE — TELEPHONE ENCOUNTER
Called to speak with patient. Left message for her to call this office.    Patient has abnormal quad screen, orders placed for MFM consult .

## 2017-12-05 ENCOUNTER — OFFICE VISIT (OUTPATIENT)
Dept: MATERNAL FETAL MEDICINE | Facility: CLINIC | Age: 32
End: 2017-12-05
Payer: COMMERCIAL

## 2017-12-05 ENCOUNTER — LAB VISIT (OUTPATIENT)
Dept: LAB | Facility: OTHER | Age: 32
End: 2017-12-05
Attending: OBSTETRICS & GYNECOLOGY
Payer: COMMERCIAL

## 2017-12-05 VITALS
BODY MASS INDEX: 23.73 KG/M2 | DIASTOLIC BLOOD PRESSURE: 60 MMHG | SYSTOLIC BLOOD PRESSURE: 112 MMHG | WEIGHT: 160.69 LBS

## 2017-12-05 DIAGNOSIS — O30.222: Primary | ICD-10-CM

## 2017-12-05 DIAGNOSIS — Z34.02 ENCOUNTER FOR SUPERVISION OF NORMAL FIRST PREGNANCY IN SECOND TRIMESTER: Primary | ICD-10-CM

## 2017-12-05 DIAGNOSIS — Z36.9 ANTENATAL SCREENING ENCOUNTER: ICD-10-CM

## 2017-12-05 DIAGNOSIS — Z36.89 ENCOUNTER FOR ULTRASOUND TO CHECK FETAL GROWTH: ICD-10-CM

## 2017-12-05 DIAGNOSIS — D57.20 SICKLE CELL-HEMOGLOBIN C DISEASE WITHOUT CRISIS: ICD-10-CM

## 2017-12-05 DIAGNOSIS — Z34.02 ENCOUNTER FOR SUPERVISION OF NORMAL FIRST PREGNANCY IN SECOND TRIMESTER: ICD-10-CM

## 2017-12-05 LAB
BASOPHILS # BLD AUTO: 0.02 K/UL
BASOPHILS NFR BLD: 0.2 %
DIFFERENTIAL METHOD: ABNORMAL
EOSINOPHIL # BLD AUTO: 0.1 K/UL
EOSINOPHIL NFR BLD: 0.6 %
ERYTHROCYTE [DISTWIDTH] IN BLOOD BY AUTOMATED COUNT: 15.4 %
HCT VFR BLD AUTO: 28.8 %
HGB BLD-MCNC: 10.3 G/DL
LYMPHOCYTES # BLD AUTO: 2.3 K/UL
LYMPHOCYTES NFR BLD: 16.9 %
MCH RBC QN AUTO: 30.4 PG
MCHC RBC AUTO-ENTMCNC: 35.8 G/DL
MCV RBC AUTO: 85 FL
MONOCYTES # BLD AUTO: 1 K/UL
MONOCYTES NFR BLD: 7.4 %
NEUTROPHILS # BLD AUTO: 9.9 K/UL
NEUTROPHILS NFR BLD: 74.4 %
PLATELET # BLD AUTO: 285 K/UL
PMV BLD AUTO: 11.3 FL
RBC # BLD AUTO: 3.39 M/UL
WBC # BLD AUTO: 13.31 K/UL

## 2017-12-05 PROCEDURE — 36415 COLL VENOUS BLD VENIPUNCTURE: CPT

## 2017-12-05 PROCEDURE — 76811 OB US DETAILED SNGL FETUS: CPT | Mod: S$GLB,,, | Performed by: OBSTETRICS & GYNECOLOGY

## 2017-12-05 PROCEDURE — 85025 COMPLETE CBC W/AUTO DIFF WBC: CPT

## 2017-12-05 PROCEDURE — 99243 OFF/OP CNSLTJ NEW/EST LOW 30: CPT | Mod: 25,S$GLB,, | Performed by: OBSTETRICS & GYNECOLOGY

## 2017-12-05 PROCEDURE — 99999 PR PBB SHADOW E&M-EST. PATIENT-LVL II: CPT | Mod: PBBFAC,,, | Performed by: OBSTETRICS & GYNECOLOGY

## 2017-12-05 RX ORDER — FOLIC ACID 1 MG/1
4 TABLET ORAL DAILY
Qty: 120 TABLET | Refills: 6 | Status: SHIPPED | OUTPATIENT
Start: 2017-12-05 | End: 2018-08-10 | Stop reason: SDUPTHER

## 2017-12-05 RX ORDER — FOLIC ACID 1 MG/1
4 TABLET ORAL DAILY
Qty: 120 TABLET | Refills: 6 | Status: CANCELLED | OUTPATIENT
Start: 2017-12-05 | End: 2018-12-05

## 2017-12-05 NOTE — LETTER
December 10, 2017      Skylar Aviles MD  [address]  [address]  [place] [address]           [facility]  [address]  [place] [facility] [address]  Phone: [phone]          Patient: Gautam Sotomayor   MR Number: [id number]   YOB: 1985   Date of Visit: 12/5/2017       Dear Dr. Skylar Aviles:    Thank you for referring Gautam Sotomayor to me for evaluation. Attached you will find relevant portions of my assessment and plan of care.    If you have questions, please do not hesitate to call me. I look forward to following Gautam Sotomayor along with you.    Sincerely,    Aubrie Chaudhry MD    Enclosure  CC:  No Recipients    If you would like to receive this communication electronically, please contact [email or web address] or [phone] to request more information on [employer] Link access.    For providers and/or their staff who would like to refer a patient to [facility], please contact us through our one-stop-shop provider referral line, [facility], at [phone].    If you feel you have received this communication in error or would no longer like to receive these types of communications, please e-mail [email or web address]

## 2017-12-07 ENCOUNTER — PATIENT MESSAGE (OUTPATIENT)
Dept: INTERNAL MEDICINE | Facility: CLINIC | Age: 32
End: 2017-12-07

## 2017-12-07 ENCOUNTER — ROUTINE PRENATAL (OUTPATIENT)
Dept: OBSTETRICS AND GYNECOLOGY | Facility: CLINIC | Age: 32
End: 2017-12-07
Payer: COMMERCIAL

## 2017-12-07 VITALS — SYSTOLIC BLOOD PRESSURE: 120 MMHG | WEIGHT: 159 LBS | BODY MASS INDEX: 23.48 KG/M2 | DIASTOLIC BLOOD PRESSURE: 60 MMHG

## 2017-12-07 DIAGNOSIS — Z34.02 ENCOUNTER FOR PRENATAL CARE IN SECOND TRIMESTER OF FIRST PREGNANCY: ICD-10-CM

## 2017-12-07 DIAGNOSIS — Z90.81 POST-SPLENECTOMY: ICD-10-CM

## 2017-12-07 DIAGNOSIS — N76.0 ACUTE VAGINITIS: ICD-10-CM

## 2017-12-07 DIAGNOSIS — D57.20 SICKLE CELL-HEMOGLOBIN C DISEASE WITHOUT CRISIS: Primary | ICD-10-CM

## 2017-12-07 DIAGNOSIS — O98.512 HERPES SIMPLEX VIRUS TYPE 2 (HSV-2) INFECTION AFFECTING PREGNANCY IN SECOND TRIMESTER: ICD-10-CM

## 2017-12-07 DIAGNOSIS — B00.9 HERPES SIMPLEX VIRUS TYPE 2 (HSV-2) INFECTION AFFECTING PREGNANCY IN SECOND TRIMESTER: ICD-10-CM

## 2017-12-07 PROCEDURE — 0502F SUBSEQUENT PRENATAL CARE: CPT | Mod: S$GLB,,, | Performed by: OBSTETRICS & GYNECOLOGY

## 2017-12-07 PROCEDURE — 99999 PR PBB SHADOW E&M-EST. PATIENT-LVL II: CPT | Mod: PBBFAC,,, | Performed by: OBSTETRICS & GYNECOLOGY

## 2017-12-07 RX ORDER — NAPROXEN SODIUM 220 MG/1
81 TABLET, FILM COATED ORAL DAILY
COMMUNITY
End: 2018-11-12

## 2017-12-07 RX ORDER — TERCONAZOLE 4 MG/G
1 CREAM VAGINAL NIGHTLY
Qty: 1 G | Refills: 0 | Status: SHIPPED | OUTPATIENT
Start: 2017-12-07 | End: 2017-12-14

## 2017-12-08 ENCOUNTER — LAB VISIT (OUTPATIENT)
Dept: LAB | Facility: HOSPITAL | Age: 32
End: 2017-12-08
Attending: OBSTETRICS & GYNECOLOGY
Payer: COMMERCIAL

## 2017-12-08 ENCOUNTER — PATIENT MESSAGE (OUTPATIENT)
Dept: INTERNAL MEDICINE | Facility: CLINIC | Age: 32
End: 2017-12-08

## 2017-12-08 DIAGNOSIS — Z36.9 ANTENATAL SCREENING ENCOUNTER: ICD-10-CM

## 2017-12-08 LAB
ESTIMATED AVG GLUCOSE: ABNORMAL MG/DL
HBA1C MFR BLD HPLC: <4 %

## 2017-12-08 PROCEDURE — 36415 COLL VENOUS BLD VENIPUNCTURE: CPT

## 2017-12-08 PROCEDURE — 83036 HEMOGLOBIN GLYCOSYLATED A1C: CPT

## 2017-12-08 NOTE — TELEPHONE ENCOUNTER
Pt requesting zyrtec refill. Pt does have this rx on medication list, but you have never prescribed it and there is no mg. Please advise and authorize if appropriate.

## 2017-12-10 NOTE — PROGRESS NOTES
Indication  ========    Consultation: Fetal anatomy survey, Abnormal Quad Screen.    History  ======    General History  Other: BONY KATZ  Previous Outcomes   1  Para 0  Risk Factors  Details: Sickle Cell Anemia  Details: thyroid nodule    Pregnancy History  ==============    Maternal Lab Tests  Test: Quad/ Penta Screen  Result: Positive Quad Screen DSR 1:55  Wants to know gender: no    Maternal Assessment  =================    Weight 73 kg  Weight (lb) 161 lb  BP syst 112 mmHg  BP diast 60 mmHg    Method  ======    Transabdominal ultrasound examination.    Pregnancy  =========    [facility] pregnancy. Number of fetuses: 1.    Dating  ======    Cycle: regular cycle  Ultrasound examination on: 2017  GA by U/S based upon: AC, BPD, Femur, HC  GA by U/S 19 w + 4 d  KAYLEE by U/S: 2018  Assigned: Dating performed on 10/10/2017, based on ultrasound (CRL)  Assigned GA 19 w + 5 d  Assigned KAYLEE: 2018    General Evaluation  ==============    Cardiac activity: present.  bpm.  Fetal movements: visualized.  Presentation: breech.  Placenta: anterior.  Umbilical cord: Cord vessels: 3 vessel cord. Cord insertion: placental insertion: normal.  Amniotic fluid: normal amount.    Fetal Biometry  ============    Fetal Biometry  BPD 45.2 mm 19w 5d Hadlock  OFD 56.0 mm 19w 5d Dale  .3 mm 19w 2d Hadlock  .0 mm 19w 6d Hadlock  Femur 30.8 mm 19w 4d Hadlock  Cerebellum tr 20.7 mm 20w 3d Zee  CM 4.4 mm  Nuchal fold 3.64 mm  Humerus 30.1 mm 19w 6d Dale   g  Calculated by: Hadlock (BPD-HC-AC-FL)  EFW (lb) 0 lb  EFW (oz) 11 oz  Cephalic index 0.81  HC / AC 1.13  FL / BPD 0.68  FL / AC 0.21  MVP 7.8 cm  DELISA 19.9 cm   bpm  Head / Face / Neck   6.2 mm  Nasal bone 6.5 mm    Fetal Anatomy  ===========    Cranium: normal  Lateral ventricles: normal  Choroid plexus: normal  Midline falx: normal  Cavum septi pellucidi: normal  Cerebellum: normal  Cisterna magna: normal  Head  shape: normal  Rt lateral ventricle: normal  Lt lateral ventricle: normal  Rt choroid plexus: normal  Lt choroid plexus: normal  Parenchyma: normal  Third ventricle: normal  Posterior fossa: normal  Cerebellar lobes: normal  Vermis: normal  Neck: appears normal  Nuchal fold: normal  Lips: normal  Profile: normal  Nose: normal  Maxilla: normal  Mandible: normal  4-chamber view: normal  RVOT: normal  LVOT: normal  Heart / Thorax: Septal views: normal  Situs: normal  Aortic arch: normal  Ductal arch: normal  SVC: normal  IVC: normal  3-vessel view: suboptimal  3-vessel-trachea view: suboptimal  Cardiac position: normal  Cardiac axis: normal  Cardiac size: normal  Cardiac rhythm: normal  Rt lung: normal  Lt lung: normal  Diaphragm: normal  Cord insertion: normal  Stomach: normal  Kidneys: normal  Bladder: normal  Abdom. wall: appears normal  Abdom. cavity: normal  Rt kidney: normal  Lt kidney: normal  Liver: normal  Bowel: normal  Cervical spine: suboptimal  Thoracic spine: suboptimal  Lumbar spine: suboptimal  Sacral spine: suboptimal  Arms: normal  Legs: normal  Rt arm: normal  Lt arm: normal  Rt hand: present  Lt hand: present  Rt leg: normal  Lt leg: normal  Rt foot: normal  Lt foot: normal  Position of hands: normal  Position of feet: normal  Wants to know gender: no    Maternal Structures  ===============    Uterus / Cervix  Uterus: Normal  Cervical length 32.3 mm  Ovaries / Tubes / Adnexa  Rt ovary: Visualized  Lt ovary: Visualized  Other: Uterus and adnexa normal    Consultation  ==========    Consultation for hemoglobin SC disease, + quad screen  Recent bouts of bacterial vaginitis and yeast vaginitis  C/o low back pain.    Meds: zyrtec, zofran, PNV, tylenol  PMH:  1. 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 Crises more frequent in childhood, but less frequent as aged. Has had  splenectomy at age 21. Is up to date on appropriate vaccines post splenectomy. Last crisis 3/2017 when became  dehydrated with  gastroenteritis. No history of acute chest. Transfused when had splenectomy. T&S neg. Not on folic acid or baby aspirin yet.  2. Thyroid nodule  3. HSV 2  4. Cholelithiasis  PSH  Post teeth  Appendectomy  Splenectomy  Foot surgery  FH:  Cousin's child with hydrocephalus  ELMO on quad: 1:55  Mat father: HTN/kidney disease/COPD/emphysema  Partner: Calvin-completed Hg EE and is normal  Counseling:  Discussed disease course with Hg SC disease is usually more mild than Hg SS. However, symptoms and crises can occur during pregnancy.  Discussed we manage Hg SC disease similar to Hg SS. Discussed genetics and transmission risks as well as pregnancy considerations.  Partner is negative. Discussed with Ms. Sotomayor that offspring will carry either Hg S or Hg C trait. Discussed need for close surveillance for  preeclampsia. Serial growth ultrasounds and  testing indicated in third trimester. Recommend delivery at 39 weeks. Will have patient  start folic acid and baby aspirin. Discussed screening with urine culture q TM.  Additionally, we discussed her abnormal second trimester serum screening result. The patient's serum screen showed a risk for Down  Syndrome (DS) of 1 in 55. The risk for trisomy 18 and open neural tube defects (NTDs) was not elevated. We reviewed the testing options for  further evaluation of fetal Down Syndrome. Specifically, we discussed further screening with cell-free DNA (cfDNA) testing and reviewed the  benefits, limitations, and test performance characteristics of cfDNA testing. Ultrasound as a screening tool for DS was reviewed also. The  sensitivity of prenatal ultrasound for the detection of DS ranges from 50 - 90%; thus, it remains a screening test with limited ability to confirm or  exclude DS. We also discussed genetic amniocentesis as the diagnostic prenatal testing option for detection of DS. The risks, benefits, and  test performance characteristics of amniocentesis  were reviewed. A risk of less than 1 in 500-1 in 1000 for pregnancy loss post-amniocentesis  was also reviewed. After discussion, the patient elected to proceed with ultrasound and cell free fetal DNA which was sent after our clinic  visit.  Recurrent yeast infections (urine culture colonized with yeast). Dr. Aviles prescribed treatment for yeast vaginitis. Screened for diabetes with  hemoglobin A1C and negative. Recommend GDM screen between 24-28 weeks gestation.    Time  I overall spent approximately 40 minutes in face to face time with the patient and her family, greater than 50% of which was in counseling and  care coordination.    Impression  =========    A detailed fetal anatomic ultrasound examination was performed for the following high risk indication: hemoglobin SC disease. No fetal  structural malformations are identified; however, fetal imaging is incomplete today. A follow-up study will be scheduled to complete the fetal  anatomic survey. Fetal size today is consistent with established gestational age. Cervical length is normal. Placental location is normal without  evidence of previa.    Recommendation  ==============    Recommend repeat ultrasound and [facility] visit to complete anatomic survey.  Recommend sending hemoglobin electrophoresis on partner (sent and negative)  Given recent yeast vaginitis, recommend screening for diabetes with hemoglobin A1C (sent and normal); recommend GDM screen between  24-28 weeks gestation  Recommend serial growth ultrasounds at 28 weeks gestation  Recommend twice weekly  testing at 32 weeks gestation due to hemoglobin SC disease.  Recommend delivery between 39 0/7 - 39 6/7 weeks gestation.  Recommend starting low dose aspirin and folic acid 4 mg daily  Recommend urine culture every trimester  Will follow-up results of cell free fetal DNA test.

## 2017-12-11 ENCOUNTER — PATIENT MESSAGE (OUTPATIENT)
Dept: INTERNAL MEDICINE | Facility: CLINIC | Age: 32
End: 2017-12-11

## 2017-12-12 ENCOUNTER — TELEPHONE (OUTPATIENT)
Dept: MATERNAL FETAL MEDICINE | Facility: CLINIC | Age: 32
End: 2017-12-12

## 2017-12-20 ENCOUNTER — ROUTINE PRENATAL (OUTPATIENT)
Dept: OBSTETRICS AND GYNECOLOGY | Facility: CLINIC | Age: 32
End: 2017-12-20
Payer: COMMERCIAL

## 2017-12-20 ENCOUNTER — TELEPHONE (OUTPATIENT)
Dept: OBSTETRICS AND GYNECOLOGY | Facility: CLINIC | Age: 32
End: 2017-12-20

## 2017-12-20 VITALS
SYSTOLIC BLOOD PRESSURE: 120 MMHG | BODY MASS INDEX: 23.73 KG/M2 | DIASTOLIC BLOOD PRESSURE: 66 MMHG | WEIGHT: 160.69 LBS

## 2017-12-20 DIAGNOSIS — Z34.02 ENCOUNTER FOR SUPERVISION OF NORMAL FIRST PREGNANCY IN SECOND TRIMESTER: Primary | ICD-10-CM

## 2017-12-20 LAB
BILIRUB SERPL-MCNC: NORMAL MG/DL
BLOOD, POC UA: ABNORMAL
GLUCOSE UR QL STRIP: ABNORMAL
KETONES UR QL STRIP: ABNORMAL
LEUKOCYTE ESTERASE URINE, POC: ABNORMAL
NITRITE, POC UA: ABNORMAL
PH, POC UA: 5
PROTEIN, POC: ABNORMAL
SPECIFIC GRAVITY, POC UA: 1.01
UROBILINOGEN, POC UA: NORMAL

## 2017-12-20 PROCEDURE — 99999 PR PBB SHADOW E&M-EST. PATIENT-LVL II: CPT | Mod: PBBFAC,,,

## 2017-12-20 PROCEDURE — 0502F SUBSEQUENT PRENATAL CARE: CPT | Mod: S$GLB,,, | Performed by: OBSTETRICS & GYNECOLOGY

## 2017-12-20 NOTE — TELEPHONE ENCOUNTER
----- Message from Tonia Bosch sent at 12/20/2017  9:37 AM CST -----  Contact: YARI MUELLER [[id number]]  _x  1st Request  _  2nd Request  _  3rd Request        Who: YARI MUELLER [[id number]]    Why: patient 21 wks states she has been having some sharp pains on her side and back since yesterday and would like a call back to get an appt with CHUNG Bailey sooner than next available on 1/3/18    What Number to Call Back: [phone]    When to Expect a call back: (Before the end of the day)   -- if call after 3:00 call back will be tomorrow.

## 2017-12-20 NOTE — TELEPHONE ENCOUNTER
I left a message for the pt to call the office back to schedule her an apt to come in and be seen for a visit.

## 2017-12-21 NOTE — PROGRESS NOTES
Pt in for routine OB visit- reports some episodic lower back pain and lower L side abd pain. Denies any s/s UTI. Denies LOF or bleeding. Reports  FM. Denies  contractions. Advised negative urinalysis. Discussed common discomforts of pregnancy, discussed ligament pain, reviewed measures to alleviate.

## 2018-01-02 ENCOUNTER — ROUTINE PRENATAL (OUTPATIENT)
Dept: OBSTETRICS AND GYNECOLOGY | Facility: CLINIC | Age: 33
End: 2018-01-02
Payer: COMMERCIAL

## 2018-01-02 ENCOUNTER — OFFICE VISIT (OUTPATIENT)
Dept: MATERNAL FETAL MEDICINE | Facility: CLINIC | Age: 33
End: 2018-01-02
Payer: COMMERCIAL

## 2018-01-02 VITALS
WEIGHT: 166.44 LBS | BODY MASS INDEX: 24.58 KG/M2 | DIASTOLIC BLOOD PRESSURE: 60 MMHG | SYSTOLIC BLOOD PRESSURE: 118 MMHG

## 2018-01-02 DIAGNOSIS — Z3A.23 23 WEEKS GESTATION OF PREGNANCY: Primary | ICD-10-CM

## 2018-01-02 DIAGNOSIS — D57.20 SICKLE CELL-HEMOGLOBIN C DISEASE WITHOUT CRISIS: ICD-10-CM

## 2018-01-02 DIAGNOSIS — Z36.89 ENCOUNTER FOR ULTRASOUND TO CHECK FETAL GROWTH: ICD-10-CM

## 2018-01-02 PROCEDURE — 76816 OB US FOLLOW-UP PER FETUS: CPT | Mod: S$GLB,,, | Performed by: OBSTETRICS & GYNECOLOGY

## 2018-01-02 PROCEDURE — 99999 PR PBB SHADOW E&M-EST. PATIENT-LVL III: CPT | Mod: PBBFAC,,, | Performed by: NURSE PRACTITIONER

## 2018-01-02 PROCEDURE — 0502F SUBSEQUENT PRENATAL CARE: CPT | Mod: S$GLB,,, | Performed by: NURSE PRACTITIONER

## 2018-01-02 PROCEDURE — 99499 UNLISTED E&M SERVICE: CPT | Mod: S$GLB,,, | Performed by: OBSTETRICS & GYNECOLOGY

## 2018-01-02 RX ORDER — CETIRIZINE HYDROCHLORIDE 5 MG/1
5 TABLET ORAL DAILY
Qty: 30 TABLET | Refills: 2 | Status: SHIPPED | OUTPATIENT
Start: 2018-01-02 | End: 2018-04-04 | Stop reason: SDUPTHER

## 2018-01-02 NOTE — PROGRESS NOTES
OB Ultrasound Report (see PDF version under imaging tab)    Indication  ========    Evaluation of fetal growth - maternal SC disease.    History  ======    General History  Other: BONY KATZ  Previous Outcomes   1  Para 0  Risk Factors  Details: Sickle Cell Anemia  Details: thyroid nodule    Pregnancy History  ===============    Maternal Lab Tests  Result:  [id number] Neg    Test: Quad/ Penta Screen  Result: Positive Quad Screen DSR 1:55  Wants to know gender: no    Method  ======    Transabdominal ultrasound examination.    Pregnancy  =========    [facility] pregnancy. Number of fetuses: 1.    Dating  ======    Cycle: regular cycle  Ultrasound examination on: 2018  GA by U/S based upon: AC, BPD, Femur, HC  GA by U/S 23 w + 6 d  KAYLEE by U/S: 2018  Assigned: Dating performed on 10/10/2017, based on ultrasound (CRL)  Assigned GA 23 w + 5 d  Assigned KAYLEE: 2018    General Evaluation  ===============    Cardiac activity: present.  bpm.  Fetal movements: visualized.  Presentation: cephalic.  Placenta: anterior.  Umbilical cord: 3 vessel cord.  Amniotic fluid: normal amount.    Fetal Biometry  ===========    Fetal Biometry  BPD 57.7 mm 23w 5d Hadlock  OFD 75.4 mm 24w 6d Dale  .8 mm 23w 3d Hadlock  .1 mm 24w 1d Hadlock  Femur 43.4 mm 24w 2d Hadlock  Cerebellum tr 28.0 mm 26w 0d Zee  CM 5.3 mm   g 52% Arley  Calculated by: Hadlock (BPD-HC-AC-FL)  EFW (lb) 1 lb  EFW (oz) 7 oz  Cephalic index 0.77  HC / AC 1.10  FL / BPD 0.75  FL / AC 0.22   bpm  Head / Face / Neck   5.2 mm    Fetal Anatomy  ===========    Cranium: normal  Posterior fossa: normal  4-chamber view: normal  3-vessel-trachea view: normal  Stomach: normal  Kidneys: normal  Bladder: normal  Cervical spine: normal  Thoracic spine: normal  Lumbar spine: normal  Sacral spine: normal  Wants to know gender: no  Other: A full anatomy survey previously performed.    Impression  =========    A follow up  fetal anatomical ultrasound was completed today.  The fetal anatomic survey was completed today, and no fetal structural abnormalities were noted. Interval fetal growth has been  normal, and the AFV is normal.  F/U scans to assess fetal growth will be scheduled q 4 - 6 weeks due to maternal SC disease.

## 2018-01-02 NOTE — PROGRESS NOTES
Here for routine OB appt at 23w5d, with no complaints. Had gender reveal over weekend- It's a BOY. Denies VB and cramping. +FM. Had f/u anatomy scan today before this appointment. Refilled zyrtec and prenatal. Pain, bleeding, and PTL precautions given.  F/U scheduled 4 weeks  GTT at next visit

## 2018-01-15 ENCOUNTER — TELEPHONE (OUTPATIENT)
Dept: OBSTETRICS AND GYNECOLOGY | Facility: CLINIC | Age: 33
End: 2018-01-15

## 2018-01-15 ENCOUNTER — OFFICE VISIT (OUTPATIENT)
Dept: HEMATOLOGY/ONCOLOGY | Facility: CLINIC | Age: 33
End: 2018-01-15
Payer: COMMERCIAL

## 2018-01-15 ENCOUNTER — TELEPHONE (OUTPATIENT)
Dept: HEMATOLOGY/ONCOLOGY | Facility: CLINIC | Age: 33
End: 2018-01-15

## 2018-01-15 VITALS
BODY MASS INDEX: 24.95 KG/M2 | HEIGHT: 69 IN | SYSTOLIC BLOOD PRESSURE: 118 MMHG | HEART RATE: 76 BPM | WEIGHT: 168.44 LBS | DIASTOLIC BLOOD PRESSURE: 75 MMHG

## 2018-01-15 DIAGNOSIS — D57.20 SICKLE CELL-HEMOGLOBIN C DISEASE WITHOUT CRISIS: Primary | ICD-10-CM

## 2018-01-15 DIAGNOSIS — Z90.81 POST-SPLENECTOMY: ICD-10-CM

## 2018-01-15 PROCEDURE — 99214 OFFICE O/P EST MOD 30 MIN: CPT | Mod: S$GLB,,, | Performed by: INTERNAL MEDICINE

## 2018-01-15 PROCEDURE — 99999 PR PBB SHADOW E&M-EST. PATIENT-LVL III: CPT | Mod: PBBFAC,,, | Performed by: INTERNAL MEDICINE

## 2018-01-15 NOTE — TELEPHONE ENCOUNTER
Pt CO having pain and burning in her stomach. patient 25 weeks 4 day. No fever, nausea, vomiting nor bleeding. Advised pt that Kristal will give her a call. Verbalized understanding.

## 2018-01-15 NOTE — TELEPHONE ENCOUNTER
----- Message from Lisa Ortiz RN sent at 1/15/2018  1:00 PM CST -----      ----- Message -----  From: Yao Le Jr., MD  Sent: 1/15/2018  11:37 AM  To: Rey ALARCON Jr Staff    Schedule 2 d echo with [facility]

## 2018-01-15 NOTE — PROGRESS NOTES
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  This diagnosis   was confirmed when I saw her in March 2017.  A hemoglobin electrophoresis at   that time had 50% hemoglobin S, 43% hemoglobin C and 3.2% hemoglobin A2.    She is now pregnant for the first time.  She gave me the name and birth date of   her partner.  I reviewed his record.  He does not have microcytosis.  He had a   hemoglobin electrophoresis study, which showed no hemoglobinopathy.    Ms. Sotomayor had a splenic rupture at age 21.  She has had the appropriate   vaccines related to that.  She had an abdominal ultrasound examination in April 2016, which showed the presence of gallstones.    She has been told in the past that she had retinal changes related to sickle   cell disease.  She last saw Dr. Jaylon Crisostomo in the Department of   Ophthalmology in May 2017.  Because of her current pregnancy, I have asked her   to make a return appointment in Ophthalmology for a retina examination since   there is an increased incidence of retinal complications in patients with SC   disease during pregnancy.  She assures me she will do so.    She has generally been feeling well.  She recently had some upper abdominal   pain, which awakens her at night.  She has not had fever or cough.  She   describes the discomfort as cramping.  She is seeing her primary care physician   regarding this and I have told her to  remind her primary care doctor that she   has gallstones.    She reports no symptoms of cardiac disease.  She is scheduled to have an   electrocardiogram.    ADDITIONAL PAST HISTORY, SYSTEM REVIEW, SOCIAL HISTORY AND FAMILY HISTORY:  Have   been reviewed and updated in the electronic record.    PHYSICAL EXAMINATION:  GENERAL APPEARANCE:  Well-developed, well-nourished woman, in no distress.  EYES:  No jaundice or pallor.  MOUTH AND THROAT:  Good dentition.  No mucosal lesions.  NECK:  Mild thyromegaly with several tiny nodules.  No  bruits.  LYMPH NODES:  No enlarged cervical or inguinal nodes.  CHEST AND LUNGS:  Normal respiratory effort.  Clear to auscultation and   percussion.  HEART:  Regular rate and rhythm without murmur or gallop.  ABDOMEN:  Enlarged uterus.  Very minimal tenderness throughout the abdomen.  EXTREMITIES:  No edema or cyanosis.  PERIPHERAL VASCULATURE:  Good pulses in the feet and ankles.  NEUROLOGIC:  Motor function is normal.  Mental status is good.  She is fully   oriented.    LABORATORY STUDIES:  Her last blood count in early December 2017, included   hemoglobin 10.3, WBC 13,310 and platelets 285,000.    IMPRESSION:  1.  Hemoglobin SC disease.  2.  Prior splenectomy.  3.  Intrauterine pregnancy.    RECOMMENDATIONS:  1.  As noted above, she will arrange a retinal examination.  2.  I am ordering a 2D echocardiogram with color flow Doppler as a baseline,   should she have any problems in the peripartum period.  There is an increased   incidence of preeclampsia etc., in patients with hemoglobin SC disease.  3.  I think it is very unlikely this patient will require red cell transfusions   since her hemoglobin is not expected to decline substantially during pregnancy.    She is currently taking both folic acid and prenatal vitamins with iron.  4.  She will notify me if she has any additional problems or difficulties.      DEE/MELINDA  dd: 01/15/2018 11:37:00 (CST)  td: 01/16/2018 03:54:06 (CST)  Doc ID   #[id number]  Job ID #[id number]    CC: Efrain Yoo and Skylar Aviles

## 2018-01-15 NOTE — TELEPHONE ENCOUNTER
Called pt in regards to e-mail.     C/o abdominal pain last week that went away with rest. Over weekend started having pain in middle of night-trouble breathing but better with sitting up in bed with pillows. Woke up this am with pain under her right breast and lower left abdomen. Reports headache today-took tylenol. No improvement. Rates pain 5-6/10. No vision changes. + dizziness and nausea, no vomiting. Denies SOB, chest pain, vaginal bleeding, LOF, and ctx. Drinking plenty of water today. Last BM 3 days ago. Reports +FM. Offered appt in urgent care but is closed today. Discussed precautions and advised to go to [facility] if headache does not improve and/or SOB, abdominal pain returns. Pt has appt scheduled for tomorrow. Pt verbalized understanding.

## 2018-01-16 ENCOUNTER — HOSPITAL ENCOUNTER (EMERGENCY)
Facility: OTHER | Age: 33
Discharge: HOME | End: 2018-01-16
Attending: OBSTETRICS & GYNECOLOGY
Payer: COMMERCIAL

## 2018-01-16 ENCOUNTER — ROUTINE PRENATAL (OUTPATIENT)
Dept: OBSTETRICS AND GYNECOLOGY | Facility: CLINIC | Age: 33
End: 2018-01-16
Payer: COMMERCIAL

## 2018-01-16 VITALS
DIASTOLIC BLOOD PRESSURE: 64 MMHG | WEIGHT: 166.69 LBS | BODY MASS INDEX: 24.61 KG/M2 | SYSTOLIC BLOOD PRESSURE: 122 MMHG

## 2018-01-16 VITALS
DIASTOLIC BLOOD PRESSURE: 63 MMHG | RESPIRATION RATE: 18 BRPM | TEMPERATURE: 98 F | SYSTOLIC BLOOD PRESSURE: 118 MMHG | HEART RATE: 88 BPM | OXYGEN SATURATION: 100 %

## 2018-01-16 DIAGNOSIS — Z3A.25 25 WEEKS GESTATION OF PREGNANCY: ICD-10-CM

## 2018-01-16 DIAGNOSIS — R10.9 ABDOMINAL PAIN DURING PREGNANCY, SECOND TRIMESTER: ICD-10-CM

## 2018-01-16 DIAGNOSIS — O26.899 ABDOMINAL PAIN AFFECTING PREGNANCY: Primary | ICD-10-CM

## 2018-01-16 DIAGNOSIS — O26.892 ABDOMINAL PAIN DURING PREGNANCY, SECOND TRIMESTER: ICD-10-CM

## 2018-01-16 DIAGNOSIS — R10.11 RIGHT UPPER QUADRANT ABDOMINAL PAIN: ICD-10-CM

## 2018-01-16 DIAGNOSIS — R14.1 GAS PAIN: ICD-10-CM

## 2018-01-16 DIAGNOSIS — Z3A.25 25 WEEKS GESTATION OF PREGNANCY: Primary | ICD-10-CM

## 2018-01-16 DIAGNOSIS — R10.9 ABDOMINAL PAIN AFFECTING PREGNANCY: Primary | ICD-10-CM

## 2018-01-16 LAB
ALBUMIN SERPL BCP-MCNC: 3.3 G/DL
ALP SERPL-CCNC: 66 U/L
ALT SERPL W/O P-5'-P-CCNC: 14 U/L
AMYLASE SERPL-CCNC: 116 U/L
ANION GAP SERPL CALC-SCNC: 7 MMOL/L
ANISOCYTOSIS BLD QL SMEAR: SLIGHT
AST SERPL-CCNC: 19 U/L
BASOPHILS # BLD AUTO: ABNORMAL K/UL
BASOPHILS NFR BLD: 0 %
BILIRUB SERPL-MCNC: 0.4 MG/DL
BUN SERPL-MCNC: 6 MG/DL
CALCIUM SERPL-MCNC: 9.1 MG/DL
CHLORIDE SERPL-SCNC: 107 MMOL/L
CO2 SERPL-SCNC: 24 MMOL/L
CREAT SERPL-MCNC: 0.7 MG/DL
DIFFERENTIAL METHOD: ABNORMAL
EOSINOPHIL # BLD AUTO: ABNORMAL K/UL
EOSINOPHIL NFR BLD: 0 %
ERYTHROCYTE [DISTWIDTH] IN BLOOD BY AUTOMATED COUNT: 15.3 %
EST. GFR  (AFRICAN AMERICAN): >60 ML/MIN/1.73 M^2
EST. GFR  (NON AFRICAN AMERICAN): >60 ML/MIN/1.73 M^2
GIANT PLATELETS BLD QL SMEAR: PRESENT
GLUCOSE SERPL-MCNC: 92 MG/DL
HCT VFR BLD AUTO: 30.3 %
HGB BLD-MCNC: 10.9 G/DL
LIPASE SERPL-CCNC: 22 U/L
LYMPHOCYTES # BLD AUTO: ABNORMAL K/UL
LYMPHOCYTES NFR BLD: 25 %
MCH RBC QN AUTO: 31.4 PG
MCHC RBC AUTO-ENTMCNC: 36 G/DL
MCV RBC AUTO: 87 FL
MONOCYTES # BLD AUTO: ABNORMAL K/UL
MONOCYTES NFR BLD: 10 %
NEUTROPHILS NFR BLD: 65 %
PLATELET # BLD AUTO: 305 K/UL
PLATELET BLD QL SMEAR: ABNORMAL
PMV BLD AUTO: 11.6 FL
POLYCHROMASIA BLD QL SMEAR: ABNORMAL
POTASSIUM SERPL-SCNC: 3.9 MMOL/L
PROT SERPL-MCNC: 6.8 G/DL
RBC # BLD AUTO: 3.47 M/UL
SODIUM SERPL-SCNC: 138 MMOL/L
WBC # BLD AUTO: 14.53 K/UL

## 2018-01-16 PROCEDURE — 85007 BL SMEAR W/DIFF WBC COUNT: CPT

## 2018-01-16 PROCEDURE — 76705 ECHO EXAM OF ABDOMEN: CPT

## 2018-01-16 PROCEDURE — 0502F SUBSEQUENT PRENATAL CARE: CPT | Mod: S$GLB,,, | Performed by: NURSE PRACTITIONER

## 2018-01-16 PROCEDURE — 59025 FETAL NON-STRESS TEST: CPT

## 2018-01-16 PROCEDURE — 85027 COMPLETE CBC AUTOMATED: CPT

## 2018-01-16 PROCEDURE — 99284 EMERGENCY DEPT VISIT MOD MDM: CPT | Mod: 25

## 2018-01-16 PROCEDURE — 99999 PR PBB SHADOW E&M-EST. PATIENT-LVL III: CPT | Mod: PBBFAC,,, | Performed by: NURSE PRACTITIONER

## 2018-01-16 PROCEDURE — 82150 ASSAY OF AMYLASE: CPT

## 2018-01-16 PROCEDURE — 59025 FETAL NON-STRESS TEST: CPT | Mod: 26,,, | Performed by: OBSTETRICS & GYNECOLOGY

## 2018-01-16 PROCEDURE — 99284 EMERGENCY DEPT VISIT MOD MDM: CPT | Mod: 25,,, | Performed by: OBSTETRICS & GYNECOLOGY

## 2018-01-16 PROCEDURE — 25000003 PHARM REV CODE 250: Performed by: OBSTETRICS & GYNECOLOGY

## 2018-01-16 PROCEDURE — 80053 COMPREHEN METABOLIC PANEL: CPT

## 2018-01-16 PROCEDURE — 83690 ASSAY OF LIPASE: CPT

## 2018-01-16 RX ORDER — SIMETHICONE 80 MG
80 TABLET,CHEWABLE ORAL EVERY 6 HOURS PRN
Refills: 0 | COMMUNITY
Start: 2018-01-16 | End: 2018-11-12

## 2018-01-16 RX ADMIN — LIDOCAINE HYDROCHLORIDE: 20 SOLUTION ORAL; TOPICAL at 06:01

## 2018-01-16 NOTE — ED PROVIDER NOTES
Encounter Date: 2018       History     Chief Complaint   Patient presents with    Abdominal Pain     Gautam Sotomayor is a 32 y.o. [id number] at 25w5d presents complaining of sharp intermittent abdominal pain worse at night since last Thursday. Not worse with eating. Admits to eating a lot of fatty food. Patient has had asymptomatic gallstones for last ten years and has been told she would need them removed one day. She has a history of splenectomy due to her hgb SC disease and appendectomy.    Patient denies contractions, denies vaginal bleeding, denies LOF.   Fetal Movement: normal.  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          Review of patient's allergies indicates:   Allergen Reactions    Nickel Rash     Past Medical History:   Diagnosis Date    Abnormal Pap smear of cervix ,     colpo    Allergic rhinitis     Anemia     Genital herpes 2012    Outbreaks 1-2 per year, last outbreak 2017    Sickle cell disease, type SC     Last crisis 3/2017     Past Surgical History:   Procedure Laterality Date    APPENDECTOMY      FOOT SURGERY Left     SPLENECTOMY, TOTAL      WISDOM TOOTH EXTRACTION       Family History   Problem Relation Age of Onset    Breast cancer Neg Hx     Colon cancer Neg Hx     Ovarian cancer Neg Hx      Social History   Substance Use Topics    Smoking status: Current Every Day Smoker     Packs/day: 0.25     Years: 10.00    Smokeless tobacco: Never Used    Alcohol use 0.6 oz/week     1 Cans of beer per week      Comment: socially     Review of Systems   Constitutional: Negative for chills and fever.   Eyes: Negative for visual disturbance.   Cardiovascular: Negative for chest pain.   Gastrointestinal: Positive for abdominal pain. Negative for constipation, diarrhea, nausea and vomiting.   Genitourinary: Negative for dysuria, vaginal bleeding and vaginal discharge.   Neurological: Negative for seizures and headaches.       Physical  Exam     Vitals:    01/16/18 1538 01/16/18 1539   BP: 118/63    Pulse: 90 88   Resp: 18    Temp: 98.4 °F (36.9 °C)    TempSrc: Temporal    SpO2: 100%        Physical Exam    Vitals reviewed.  Constitutional: She appears well-developed and well-nourished. She is not diaphoretic. No distress.   HENT:   Head: Normocephalic and atraumatic.   Eyes: EOM are normal.   Neck: Normal range of motion.   Cardiovascular: Normal rate.   Pulmonary/Chest: No respiratory distress.   Abdominal: Soft. There is tenderness (RUQ tenderness. No RLQ tenderness, neg sun's sign ). There is no rebound and no guarding.   Gravid    Neurological: She is alert and oriented to person, place, and time.   Skin: Skin is warm. No rash noted. No erythema.   Psychiatric: She has a normal mood and affect. Her behavior is normal. Judgment and thought content normal.         ED Course   Obtain Fetal nonstress test (NST)  Date/Time: 1/16/2018 6:18 PM  Performed by: REDDY, SUSHMA K  Authorized by: REDDY, SUSHMA K     Nonstress Test:     Variability:  6-25 BPM    Decelerations: occ variables.    Accelerations:  10 bpm    Acoustic Stimulator: No      Baseline:  145    Uterine Irritability: No    Biophysical Profile:     Nonstress Test Interpretation: appropriate for gestational age      Overall Impression:  Reassuring      Labs Reviewed   AMYLASE - Abnormal; Notable for the following:        Result Value    Amylase 116 (*)     All other components within normal limits   COMPREHENSIVE METABOLIC PANEL - Abnormal; Notable for the following:     Albumin 3.3 (*)     Anion Gap 7 (*)     All other components within normal limits   CBC W/ AUTO DIFFERENTIAL - Abnormal; Notable for the following:     WBC 14.53 (*)     RBC 3.47 (*)     Hemoglobin 10.9 (*)     Hematocrit 30.3 (*)     MCH 31.4 (*)     RDW 15.3 (*)     All other components within normal limits   LIPASE             Medical Decision Making:   Initial Assessment:   IUP @ 25.5, history of PUD, here with  nonspecific abdominal pain  Differential Diagnosis:   Gas  PUD  Pancreatitis    ED Management:  NST started on arrival. Reassuring for GA  VSS. Afebrile  No RUQ tenderness, sun's sign neg  RUQ ultrasound shows cholelithiasis without evidence for infection or obstruction of gallbladder. A renal cyst was seen as well.  CBC, CMP ordered; WBC 14.  Amylase elevated to 116 (mild), lipase normal.  GI cocktail given with minimal improvement  Concern for pancreatitis is low at this time  Patient   Other:   I have discussed this case with another health care provider.       <> Summary of the Discussion: D/w Dr. Cartagena              Attending Attestation:   Physician Attestation Statement for Resident:  As the supervising MD   Physician Attestation Statement: I have personally seen and examined this patient.   I agree with the above history. -:   As the supervising MD I agree with the above PE.    As the supervising MD I agree with the above treatment, course, plan, and disposition.  I was personally present during the critical portions of the procedure(s) performed by the resident and was immediately available in the ED to provide services and assistance as needed during the entire procedure.  I have reviewed and agree with the residents interpretation of the following: lab data, x-rays and rhythm strips.  I have reviewed the following: old records at this facility.                    ED Course as of Jan 20 0641 Tue Jan 16, 2018   1615 I examined Ms. Sotomayor and discussed plan with Dr. Weaver.  Pt presents at 25.5wga with upper abdominal pain that does not feel like cramping/contractions.  Burning/pressure in nature.  It is associated with nausea.  She had her cervix checked in clinic just before coming over, which was closed.  She has a h/o gallstones.  VSS.  Fetal status  reassuring for gestational age.  No contractions noted.  Abdomen soft, non-tender.  No guarding or rebound.  Negative murpheys/mcburneys.  Will get US,  CBC, CMP, amylase and lipase to evaluate for gallbladder disease  [HU]   1717 I discussed w pt that the abd US is negative.  I again questioned where the pain was located,she states that it starts in the epigastrum and radiates down her abdomen.  She tried zantac last night that did not help.  Will try GI cocktail  [ANITA]   1841 We discussed the lab results and that everything was basically normal.  She has a mildly elevated WBC and amylase - but otherwise normal.  On further discussion, the hardness of her belly at night, pain all over belly - I wonder about gas pain.  We discussed at this point I am worried about scar tissue from previous surgery and gas pain.  Other etiologies are less likely.  She will be discharged home to try gas x qid over next day or 2 and call [facility] or return to [facility] if not better  []      ED Course User Index  [ANITA] Qing Cartagena DO     Clinical Impression:   The primary encounter diagnosis was Abdominal pain affecting pregnancy. Diagnoses of Right upper quadrant abdominal pain, Gas pain, and 25 weeks gestation of pregnancy were also pertinent to this visit.    Disposition:   Disposition: Discharged  Condition: Stable         Kate Bentley MD  Resident  01/17/18 0901       Qing Cartagena DO  01/20/18 0685

## 2018-01-16 NOTE — ED TRIAGE NOTES
Pt arrived to OB ED with complaints of abdominal pain (not contractions) since Thursday.  She states the pain is mostly on the right side and wake her at night in pain.  Pt denies LOF, vaginal bleeding.  Reports +FM.  MD notified.  TOCO and EFM applied.

## 2018-01-16 NOTE — PROGRESS NOTES
"Here for routine OB appt at 25w5d, with c/o abdominal pain described as "tightening" since this morning. Rates pain 8/10. States headache from yesterday resolved last night. She has not taken any meds or Tylenol today. C/o nausea yesterday-none today. Saw her hematologist yesterday and was reminded that she does have a hx of gallstones. States she has been told in past she would probably need her gallbladder removed one day. She did not go to the [facility] yesterday as advised for abdominal pain. SSE done and cervix visually closed. States pain is intermittent during the day and constant for about an hour during the night. Has had two bottles of water today thus far. Denies VB. + FM.  Pain, bleeding, and PTL precautions given.  F/U scheduled 3 weeks  -To [facility] today for further evaluation and monitoring    "

## 2018-01-17 NOTE — DISCHARGE INSTRUCTIONS
Call clinic [phone] or L & D after hours at [phone] for vaginal bleeding, leakage of fluids, contractions 4-5 in 2 hours, decreased fetal movements ( 10 kicks in 2 hours), headache not relieved by Tylenol, blurry vision, or temp of 100.4 or greater.  Begin doing fetal kick counts, at least 10 movements in 2 hours starting at 28 weeks gestation.  Keep next clinic appointment

## 2018-01-30 ENCOUNTER — OFFICE VISIT (OUTPATIENT)
Dept: MATERNAL FETAL MEDICINE | Facility: CLINIC | Age: 33
End: 2018-01-30
Payer: COMMERCIAL

## 2018-01-30 DIAGNOSIS — Z36.89 ENCOUNTER FOR ULTRASOUND TO CHECK FETAL GROWTH: Primary | ICD-10-CM

## 2018-01-30 DIAGNOSIS — D57.1 MATERNAL SICKLE CELL ANEMIA COMPLICATING PREGNANCY IN SECOND TRIMESTER: ICD-10-CM

## 2018-01-30 DIAGNOSIS — O99.012 MATERNAL SICKLE CELL ANEMIA COMPLICATING PREGNANCY IN SECOND TRIMESTER: ICD-10-CM

## 2018-01-30 PROCEDURE — 99499 UNLISTED E&M SERVICE: CPT | Mod: S$GLB,,, | Performed by: OBSTETRICS & GYNECOLOGY

## 2018-01-30 PROCEDURE — 76816 OB US FOLLOW-UP PER FETUS: CPT | Mod: S$GLB,,, | Performed by: OBSTETRICS & GYNECOLOGY

## 2018-01-30 NOTE — PROGRESS NOTES
OB Ultrasound Report (see PDF version under imaging tab)    Indication  ========    Evaluation of fetal growth . Sickle cell disease.    History  ======    General History  Other: BONY KATZ  Previous Outcomes   1  Para 0  Risk Factors  Details: Sickle Cell Anemia  Details: thyroid nodule    Pregnancy History  ===============    Maternal Lab Tests  Result:  [id number] Neg    Test: Quad/ Penta Screen  Result: Positive Quad Screen DSR 1:55  Wants to know gender: no    Method  ======    Transabdominal ultrasound examination. View: Good view.    Pregnancy  =========    [facility] pregnancy. Number of fetuses: 1.    Dating  ======    Cycle: regular cycle  Ultrasound examination on: 2018  GA by U/S based upon: AC, BPD, Femur, HC  GA by U/S 27 w + 3 d  KAYLEE by U/S: 2018  Assigned: Dating performed on 10/10/2017, based on ultrasound (CRL)  Assigned GA 27 w + 5 d  Assigned KAYLEE: 2018    General Evaluation  ===============    Cardiac activity: present.  bpm.  Fetal movements: visualized.  Presentation: cephalic.  Placenta: anterior.  Umbilical cord: 3 vessel cord.  Amniotic fluid: normal amount.    Fetal Biometry  ===========    Fetal Biometry  BPD 68.0 mm 27w 3d [place]  OFD 87.5 mm 28w 1d [place]  .9 mm 27w 0d Hadlock  .9 mm 27w 4d Hadlock  Femur 51.8 mm 27w 5d Hadlock  EFW 1,093 g 42% Ar[place]  Calculated by: Hadlock (BPD-HC-AC-FL)  EFW (lb) 2 lb  EFW (oz) 7 oz  Cephalic index 0.78  HC / AC 1.07  FL / BPD 0.76  FL / AC 0.22   bpm    Fetal Anatomy  ===========    Cranium: normal  Posterior fossa: normal  4-chamber view: visualized  Stomach: normal  Kidneys: normal  Bladder: normal  Wants to know gender: no  Other: A full anatomy survey previously performed.    Impression  =========    Fetal size is AGA with the EFW at the 42nd percentile.  Normal repeat limited fetal anatomic survey. AFV is normal.    Recommendation  ==============    Continue serial ultrasound exams to  assess fetal growth due to maternal SC disease.

## 2018-02-02 ENCOUNTER — LAB VISIT (OUTPATIENT)
Dept: LAB | Facility: HOSPITAL | Age: 33
End: 2018-02-02
Payer: COMMERCIAL

## 2018-02-02 DIAGNOSIS — Z3A.23 23 WEEKS GESTATION OF PREGNANCY: ICD-10-CM

## 2018-02-02 LAB
BASOPHILS # BLD AUTO: 0.05 K/UL
BASOPHILS NFR BLD: 0.3 %
DIFFERENTIAL METHOD: ABNORMAL
EOSINOPHIL # BLD AUTO: 0.1 K/UL
EOSINOPHIL NFR BLD: 0.8 %
ERYTHROCYTE [DISTWIDTH] IN BLOOD BY AUTOMATED COUNT: 14.6 %
GLUCOSE SERPL-MCNC: 105 MG/DL
HCT VFR BLD AUTO: 27.3 %
HGB BLD-MCNC: 10 G/DL
IMM GRANULOCYTES # BLD AUTO: 0.15 K/UL
IMM GRANULOCYTES NFR BLD AUTO: 1 %
LYMPHOCYTES # BLD AUTO: 2.7 K/UL
LYMPHOCYTES NFR BLD: 17.5 %
MCH RBC QN AUTO: 31.3 PG
MCHC RBC AUTO-ENTMCNC: 36.6 G/DL
MCV RBC AUTO: 85 FL
MONOCYTES # BLD AUTO: 1.4 K/UL
MONOCYTES NFR BLD: 9.2 %
NEUTROPHILS # BLD AUTO: 10.9 K/UL
NEUTROPHILS NFR BLD: 71.2 %
NRBC BLD-RTO: 1 /100 WBC
PLATELET # BLD AUTO: 266 K/UL
PMV BLD AUTO: 12.1 FL
RBC # BLD AUTO: 3.2 M/UL
WBC # BLD AUTO: 15.3 K/UL

## 2018-02-02 PROCEDURE — 36415 COLL VENOUS BLD VENIPUNCTURE: CPT

## 2018-02-02 PROCEDURE — 85025 COMPLETE CBC W/AUTO DIFF WBC: CPT

## 2018-02-02 PROCEDURE — 82950 GLUCOSE TEST: CPT

## 2018-02-08 ENCOUNTER — LAB VISIT (OUTPATIENT)
Dept: LAB | Facility: HOSPITAL | Age: 33
End: 2018-02-08
Attending: OBSTETRICS & GYNECOLOGY
Payer: COMMERCIAL

## 2018-02-08 ENCOUNTER — ROUTINE PRENATAL (OUTPATIENT)
Dept: OBSTETRICS AND GYNECOLOGY | Facility: CLINIC | Age: 33
End: 2018-02-08
Payer: COMMERCIAL

## 2018-02-08 VITALS — SYSTOLIC BLOOD PRESSURE: 110 MMHG | BODY MASS INDEX: 25.84 KG/M2 | DIASTOLIC BLOOD PRESSURE: 70 MMHG | WEIGHT: 175 LBS

## 2018-02-08 DIAGNOSIS — D57.20 SICKLE CELL-HEMOGLOBIN C DISEASE WITHOUT CRISIS: Primary | ICD-10-CM

## 2018-02-08 DIAGNOSIS — O98.513 HERPES SIMPLEX VIRUS TYPE 2 (HSV-2) INFECTION AFFECTING PREGNANCY IN THIRD TRIMESTER: ICD-10-CM

## 2018-02-08 DIAGNOSIS — B00.9 HERPES SIMPLEX VIRUS TYPE 2 (HSV-2) INFECTION AFFECTING PREGNANCY IN THIRD TRIMESTER: ICD-10-CM

## 2018-02-08 DIAGNOSIS — Z87.19 HISTORY OF GALLSTONES: ICD-10-CM

## 2018-02-08 DIAGNOSIS — Z34.03 ENCOUNTER FOR SUPERVISION OF NORMAL FIRST PREGNANCY IN THIRD TRIMESTER: ICD-10-CM

## 2018-02-08 DIAGNOSIS — R10.11 RUQ PAIN: ICD-10-CM

## 2018-02-08 DIAGNOSIS — D57.20 SICKLE CELL-HEMOGLOBIN C DISEASE WITHOUT CRISIS: ICD-10-CM

## 2018-02-08 DIAGNOSIS — Z90.81 POST-SPLENECTOMY: ICD-10-CM

## 2018-02-08 LAB
CREAT UR-MCNC: 83 MG/DL
PROT UR-MCNC: <7 MG/DL
PROT/CREAT RATIO, UR: NORMAL

## 2018-02-08 PROCEDURE — 84156 ASSAY OF PROTEIN URINE: CPT

## 2018-02-08 PROCEDURE — 99999 PR PBB SHADOW E&M-EST. PATIENT-LVL II: CPT | Mod: PBBFAC,,, | Performed by: OBSTETRICS & GYNECOLOGY

## 2018-02-08 PROCEDURE — 0502F SUBSEQUENT PRENATAL CARE: CPT | Mod: S$GLB,,, | Performed by: OBSTETRICS & GYNECOLOGY

## 2018-02-09 ENCOUNTER — TELEPHONE (OUTPATIENT)
Dept: HEMATOLOGY/ONCOLOGY | Facility: CLINIC | Age: 33
End: 2018-02-09

## 2018-02-09 NOTE — TELEPHONE ENCOUNTER
----- Message from Eloisa Velez sent at 2/9/2018 11:47 AM CST -----  Contact: Pt  Pt calling to schedule echo      Pt call back number [phone]

## 2018-02-10 ENCOUNTER — HOSPITAL ENCOUNTER (OUTPATIENT)
Dept: RADIOLOGY | Facility: OTHER | Age: 33
Discharge: HOME | End: 2018-02-10
Attending: OBSTETRICS & GYNECOLOGY
Payer: COMMERCIAL

## 2018-02-10 DIAGNOSIS — R10.11 RUQ PAIN: ICD-10-CM

## 2018-02-10 DIAGNOSIS — Z87.19 HISTORY OF GALLSTONES: ICD-10-CM

## 2018-02-10 PROCEDURE — 76705 ECHO EXAM OF ABDOMEN: CPT | Mod: 26,,, | Performed by: RADIOLOGY

## 2018-02-10 PROCEDURE — 76705 ECHO EXAM OF ABDOMEN: CPT | Mod: TC

## 2018-02-15 ENCOUNTER — CLINICAL SUPPORT (OUTPATIENT)
Dept: OBSTETRICS AND GYNECOLOGY | Facility: CLINIC | Age: 33
End: 2018-02-15
Payer: COMMERCIAL

## 2018-02-15 ENCOUNTER — HOSPITAL ENCOUNTER (OUTPATIENT)
Dept: CARDIOLOGY | Facility: CLINIC | Age: 33
Discharge: HOME | End: 2018-02-15
Attending: INTERNAL MEDICINE
Payer: COMMERCIAL

## 2018-02-15 DIAGNOSIS — D57.20 SICKLE CELL-HEMOGLOBIN C DISEASE WITHOUT CRISIS: ICD-10-CM

## 2018-02-15 DIAGNOSIS — Z34.03 ENCOUNTER FOR SUPERVISION OF NORMAL FIRST PREGNANCY IN THIRD TRIMESTER: ICD-10-CM

## 2018-02-15 DIAGNOSIS — I36.9 NONRHEUMATIC TRICUSPID VALVE DISORDER: ICD-10-CM

## 2018-02-15 LAB
DIASTOLIC DYSFUNCTION: NO
ESTIMATED PA SYSTOLIC PRESSURE: 31.3
RETIRED EF AND QEF - SEE NOTES: 65 (ref 55–65)
TRICUSPID VALVE REGURGITATION: NORMAL

## 2018-02-15 PROCEDURE — 90471 IMMUNIZATION ADMIN: CPT | Mod: S$GLB,,, | Performed by: OBSTETRICS & GYNECOLOGY

## 2018-02-15 PROCEDURE — 93306 TTE W/DOPPLER COMPLETE: CPT | Mod: S$GLB,,, | Performed by: INTERNAL MEDICINE

## 2018-02-15 PROCEDURE — 99999 PR PBB SHADOW E&M-EST. PATIENT-LVL II: CPT | Mod: PBBFAC,,,

## 2018-02-15 PROCEDURE — 90715 TDAP VACCINE 7 YRS/> IM: CPT | Mod: S$GLB,,, | Performed by: OBSTETRICS & GYNECOLOGY

## 2018-02-16 ENCOUNTER — PATIENT MESSAGE (OUTPATIENT)
Dept: OBSTETRICS AND GYNECOLOGY | Facility: CLINIC | Age: 33
End: 2018-02-16

## 2018-02-16 ENCOUNTER — PATIENT MESSAGE (OUTPATIENT)
Dept: HEMATOLOGY/ONCOLOGY | Facility: CLINIC | Age: 33
End: 2018-02-16

## 2018-02-19 ENCOUNTER — ROUTINE PRENATAL (OUTPATIENT)
Dept: OBSTETRICS AND GYNECOLOGY | Facility: CLINIC | Age: 33
End: 2018-02-19
Payer: COMMERCIAL

## 2018-02-19 VITALS
SYSTOLIC BLOOD PRESSURE: 104 MMHG | DIASTOLIC BLOOD PRESSURE: 56 MMHG | BODY MASS INDEX: 26.05 KG/M2 | WEIGHT: 176.38 LBS

## 2018-02-19 DIAGNOSIS — Z3A.30 30 WEEKS GESTATION OF PREGNANCY: Primary | ICD-10-CM

## 2018-02-19 PROCEDURE — 0502F SUBSEQUENT PRENATAL CARE: CPT | Mod: S$GLB,,, | Performed by: NURSE PRACTITIONER

## 2018-02-19 PROCEDURE — 99999 PR PBB SHADOW E&M-EST. PATIENT-LVL III: CPT | Mod: PBBFAC,,, | Performed by: NURSE PRACTITIONER

## 2018-02-19 NOTE — PROGRESS NOTES
Here for routine OB appt at 30w4d, with no complaints. Using Gas X prn which seems to be helping. Signed up for prenatal classes/hospital tours. Reports good FM.  Denies LOF, denies VB, denies contractions.  Reviewed warning signs of Labor and Preeclampsia.  Daily FM counts reinforced.  F/U scheduled 2 weeks

## 2018-02-19 NOTE — PROGRESS NOTES
Patient reports good fm, no vaginal bleeding, occasional contractions, no rupture of membranes. Overall doing well. Labs reviewed. Questions answered today. Completed prenatal classes. Labor, ROM and VB precautions given. Patient denies headaches, blurry vision, chest pain, shortness of breath or right upper quadrant pain.

## 2018-02-27 ENCOUNTER — PATIENT MESSAGE (OUTPATIENT)
Dept: OPTOMETRY | Facility: CLINIC | Age: 33
End: 2018-02-27

## 2018-03-08 ENCOUNTER — ROUTINE PRENATAL (OUTPATIENT)
Dept: OBSTETRICS AND GYNECOLOGY | Facility: CLINIC | Age: 33
End: 2018-03-08
Attending: OBSTETRICS & GYNECOLOGY
Payer: COMMERCIAL

## 2018-03-08 ENCOUNTER — LAB VISIT (OUTPATIENT)
Dept: LAB | Facility: OTHER | Age: 33
End: 2018-03-08
Attending: INTERNAL MEDICINE
Payer: COMMERCIAL

## 2018-03-08 VITALS
WEIGHT: 181.19 LBS | SYSTOLIC BLOOD PRESSURE: 118 MMHG | BODY MASS INDEX: 26.76 KG/M2 | DIASTOLIC BLOOD PRESSURE: 68 MMHG

## 2018-03-08 DIAGNOSIS — O36.8130 DECREASED FETAL MOVEMENTS IN THIRD TRIMESTER, SINGLE OR UNSPECIFIED FETUS: ICD-10-CM

## 2018-03-08 DIAGNOSIS — D57.20 SICKLE CELL-HEMOGLOBIN C DISEASE WITHOUT CRISIS: Primary | ICD-10-CM

## 2018-03-08 DIAGNOSIS — D57.20 SICKLE CELL-HEMOGLOBIN C DISEASE WITHOUT CRISIS: ICD-10-CM

## 2018-03-08 DIAGNOSIS — R10.11 RUQ PAIN: ICD-10-CM

## 2018-03-08 DIAGNOSIS — Z34.03 ENCOUNTER FOR PRENATAL CARE IN THIRD TRIMESTER OF FIRST PREGNANCY: ICD-10-CM

## 2018-03-08 LAB
ALBUMIN SERPL BCP-MCNC: 3 G/DL
ALP SERPL-CCNC: 105 U/L
ALT SERPL W/O P-5'-P-CCNC: 16 U/L
ANION GAP SERPL CALC-SCNC: 9 MMOL/L
AST SERPL-CCNC: 22 U/L
BASOPHILS # BLD AUTO: 0.03 K/UL
BASOPHILS NFR BLD: 0.2 %
BILIRUB SERPL-MCNC: 1 MG/DL
BUN SERPL-MCNC: 5 MG/DL
CALCIUM SERPL-MCNC: 9.3 MG/DL
CHLORIDE SERPL-SCNC: 105 MMOL/L
CO2 SERPL-SCNC: 22 MMOL/L
CREAT SERPL-MCNC: 0.7 MG/DL
DIFFERENTIAL METHOD: ABNORMAL
EOSINOPHIL # BLD AUTO: 0.1 K/UL
EOSINOPHIL NFR BLD: 0.6 %
ERYTHROCYTE [DISTWIDTH] IN BLOOD BY AUTOMATED COUNT: 15.6 %
EST. GFR  (AFRICAN AMERICAN): >60 ML/MIN/1.73 M^2
EST. GFR  (NON AFRICAN AMERICAN): >60 ML/MIN/1.73 M^2
GLUCOSE SERPL-MCNC: 77 MG/DL
HCT VFR BLD AUTO: 30.2 %
HGB BLD-MCNC: 10.8 G/DL
LYMPHOCYTES # BLD AUTO: 2.4 K/UL
LYMPHOCYTES NFR BLD: 12.9 %
MCH RBC QN AUTO: 32.2 PG
MCHC RBC AUTO-ENTMCNC: 35.8 G/DL
MCV RBC AUTO: 90 FL
MONOCYTES # BLD AUTO: 2 K/UL
MONOCYTES NFR BLD: 10.4 %
NEUTROPHILS # BLD AUTO: 14 K/UL
NEUTROPHILS NFR BLD: 75 %
PLATELET # BLD AUTO: 258 K/UL
PMV BLD AUTO: 12.5 FL
POTASSIUM SERPL-SCNC: 4.2 MMOL/L
PROT SERPL-MCNC: 6.8 G/DL
RBC # BLD AUTO: 3.35 M/UL
SODIUM SERPL-SCNC: 136 MMOL/L
WBC # BLD AUTO: 18.69 K/UL

## 2018-03-08 PROCEDURE — 85025 COMPLETE CBC W/AUTO DIFF WBC: CPT

## 2018-03-08 PROCEDURE — 80053 COMPREHEN METABOLIC PANEL: CPT

## 2018-03-08 PROCEDURE — 99999 PR PBB SHADOW E&M-EST. PATIENT-LVL III: CPT | Mod: PBBFAC,,, | Performed by: OBSTETRICS & GYNECOLOGY

## 2018-03-08 PROCEDURE — 36415 COLL VENOUS BLD VENIPUNCTURE: CPT

## 2018-03-08 PROCEDURE — 0502F SUBSEQUENT PRENATAL CARE: CPT | Mod: S$GLB,,, | Performed by: OBSTETRICS & GYNECOLOGY

## 2018-03-08 RX ORDER — VALACYCLOVIR HYDROCHLORIDE 1 G/1
1000 TABLET, FILM COATED ORAL DAILY
Qty: 30 TABLET | Refills: 3 | Status: ON HOLD | OUTPATIENT
Start: 2018-03-08 | End: 2018-03-23 | Stop reason: HOSPADM

## 2018-03-08 NOTE — PROGRESS NOTES
Increased RUQ pain, discussed possible pain and causes, we will repeat u/s today and liver enzymes.  Valtrex rx given  Set up for PNT due to SC disease  Reports increased pelvic pain, using support belt, note for parking at work  Stressed hydration, no sign of sickle crisis.

## 2018-03-08 NOTE — PROGRESS NOTES
Pt c/o severe cramping in the bottom left corner of belly for about 30 minutes now  Pt also has a concern that baby is not moving as much as he should

## 2018-03-09 ENCOUNTER — PATIENT MESSAGE (OUTPATIENT)
Dept: OBSTETRICS AND GYNECOLOGY | Facility: CLINIC | Age: 33
End: 2018-03-09

## 2018-03-12 ENCOUNTER — HOSPITAL ENCOUNTER (EMERGENCY)
Facility: OTHER | Age: 33
Discharge: HOME | End: 2018-03-12
Attending: OBSTETRICS & GYNECOLOGY
Payer: COMMERCIAL

## 2018-03-12 ENCOUNTER — HOSPITAL ENCOUNTER (OUTPATIENT)
Dept: PERINATAL CARE | Facility: OTHER | Age: 33
Discharge: HOME | End: 2018-03-12
Attending: OBSTETRICS & GYNECOLOGY
Payer: COMMERCIAL

## 2018-03-12 VITALS
TEMPERATURE: 99 F | RESPIRATION RATE: 18 BRPM | SYSTOLIC BLOOD PRESSURE: 111 MMHG | OXYGEN SATURATION: 100 % | DIASTOLIC BLOOD PRESSURE: 66 MMHG | HEART RATE: 110 BPM

## 2018-03-12 DIAGNOSIS — O36.8130 DECREASED FETAL MOVEMENTS IN THIRD TRIMESTER, SINGLE OR UNSPECIFIED FETUS: ICD-10-CM

## 2018-03-12 DIAGNOSIS — D57.20 SICKLE CELL-HEMOGLOBIN C DISEASE WITHOUT CRISIS: ICD-10-CM

## 2018-03-12 DIAGNOSIS — Z3A.33 33 WEEKS GESTATION OF PREGNANCY: ICD-10-CM

## 2018-03-12 DIAGNOSIS — Z36.89 NST (NON-STRESS TEST) REACTIVE: ICD-10-CM

## 2018-03-12 PROCEDURE — 59025 FETAL NON-STRESS TEST: CPT | Mod: 26,,, | Performed by: OBSTETRICS & GYNECOLOGY

## 2018-03-12 PROCEDURE — 76815 OB US LIMITED FETUS(S): CPT | Mod: 26,,, | Performed by: PEDIATRICS

## 2018-03-12 PROCEDURE — 99283 EMERGENCY DEPT VISIT LOW MDM: CPT | Mod: 25,,, | Performed by: OBSTETRICS & GYNECOLOGY

## 2018-03-12 PROCEDURE — 99283 EMERGENCY DEPT VISIT LOW MDM: CPT | Mod: 25

## 2018-03-12 PROCEDURE — 59025 FETAL NON-STRESS TEST: CPT

## 2018-03-12 PROCEDURE — 59025 FETAL NON-STRESS TEST: CPT | Mod: 76

## 2018-03-12 PROCEDURE — 59025 FETAL NON-STRESS TEST: CPT | Mod: 26,,, | Performed by: PEDIATRICS

## 2018-03-12 NOTE — ED PROVIDER NOTES
Encounter Date: 3/12/2018       History     Chief Complaint   Patient presents with    Non-stress Test     NRFHT in PNT     Gautam Sotomayor is a 32 y.o. [id number] at 33w4d presents from T for prolonged monitoring 2/2 to 2 late FHT decelerations seen.    This IUP is complicated by SS type Sc, Genital herpes, anemia.  Patient denies contractions, denies vaginal bleeding, denies LOF.   Fetal Movement: normal.            Review of patient's allergies indicates:   Allergen Reactions    Nickel Rash     Past Medical History:   Diagnosis Date    Abnormal Pap smear of cervix ,     colpo    Allergic rhinitis     Anemia     Gallstone     Genital herpes 2012    Outbreaks 1-2 per year, last outbreak 2017    Sickle cell disease, type SC     Last crisis 3/2017     Past Surgical History:   Procedure Laterality Date    APPENDECTOMY      FOOT SURGERY Left     SPLENECTOMY, TOTAL      WISDOM TOOTH EXTRACTION       Family History   Problem Relation Age of Onset    Breast cancer Neg Hx     Colon cancer Neg Hx     Ovarian cancer Neg Hx      Social History   Substance Use Topics    Smoking status: Current Every Day Smoker     Packs/day: 0.25     Years: 10.00    Smokeless tobacco: Never Used    Alcohol use 0.6 oz/week     1 Cans of beer per week      Comment: socially     Review of Systems   Constitutional: Negative for chills, fatigue and fever.   HENT: Negative for congestion.    Eyes: Negative for visual disturbance.   Respiratory: Negative for cough and shortness of breath.    Cardiovascular: Negative for chest pain and palpitations.   Gastrointestinal: Negative for abdominal distention, abdominal pain, constipation, diarrhea, nausea and vomiting.   Genitourinary: Negative for difficulty urinating, dysuria, hematuria, vaginal bleeding and vaginal discharge.   Skin: Negative for rash.   Neurological: Negative for dizziness, seizures, light-headedness and headaches.   Hematological: Does not bruise/bleed  easily.   Psychiatric/Behavioral: Negative for dysphoric mood. The patient is not nervous/anxious.        Physical Exam     Initial Vitals   BP Pulse Resp Temp SpO2   -- -- -- -- --      MAP       --         Physical Exam    Vitals reviewed.  Constitutional: She appears well-developed and well-nourished. She is not diaphoretic. No distress.   Cardiovascular: Normal rate, regular rhythm, normal heart sounds and intact distal pulses.   Pulmonary/Chest: Breath sounds normal. No respiratory distress.   Abdominal: Soft. She exhibits no distension. There is no tenderness.   Gravid     Genitourinary:   Genitourinary Comments: deferred   Musculoskeletal: She exhibits no edema.   Neurological: She is alert and oriented to person, place, and time. She has normal strength. No sensory deficit.   Skin: Skin is warm and dry.   Psychiatric: She has a normal mood and affect. Her behavior is normal. Judgment and thought content normal.         ED Course   Obtain Fetal nonstress test (NST)  Date/Time: 3/12/2018 4:28 PM  Performed by: VINEET CHAUDHRY  Authorized by: VINEET CHAUDHRY     Nonstress Test:     Variability:  6-25 BPM    Decelerations:  None    Accelerations:  15 bpm    Baseline:  155    Uterine Irritability: No      Contractions:  Regular  Biophysical Profile:     Nonstress Test Interpretation: reactive      Overall Impression:  Reassuring          Labs Reviewed - No data to display          Medical Decision Making:   ED Management:  - placed patient on the monitor   - NST reactive and reassuring x2 hours, no fetal decelerations noted  - discharged home with ED and labor precautions discussed   - to continue PNT               Attending Attestation:   Physician Attestation Statement for Resident:  As the supervising MD   Physician Attestation Statement: I have personally seen and examined this patient.   I agree with the above history. -:   As the supervising MD I agree with the above PE.    As the supervising MD I agree  with the above treatment, course, plan, and disposition.   -: I personally reviewed the fetal tracing in PNT - 1 subtle late deceleration. I reviewed the tracing in [facility] ~2hours of reactive and reassuring tracing. Dc'ed home with kick count and labor precautions.  I was personally present during the entire procedure.  I have reviewed and agree with the residents interpretation of the following: rhythm strips.  I have reviewed the following: old records at this facility.                       Clinical Impression:   The primary encounter diagnosis was Late deceleration of fetal heart rate. Diagnoses of NST (non-stress test) reactive and 33 weeks gestation of pregnancy were also pertinent to this visit.                           Anita Allred MD  Resident  03/12/18 7023       Divya Morales MD  03/15/18 3968

## 2018-03-12 NOTE — DISCHARGE INSTRUCTIONS
Call provider/return to clinic if you experience vaginal bleeding (other than spotting in the next 24 hours), leaking of fluid, 4 or more contractions in one hour, or decreased fetal movement (less than 10 movements in 2 hours).    Drink plenty of fluid - at least 10 - 12 glasses of water daily.    Call provider/return to clinic if you experience headache unrelieved by Tylenol, blurry vision, or pain in your right upper abdomen.     Your feedback is important to us.  If you should receive a survey in the next few days, please share your experience with us.

## 2018-03-13 ENCOUNTER — PATIENT MESSAGE (OUTPATIENT)
Dept: OBSTETRICS AND GYNECOLOGY | Facility: CLINIC | Age: 33
End: 2018-03-13

## 2018-03-13 NOTE — TELEPHONE ENCOUNTER
I spoke to the pt and scheduled her an apt for her to come in and discuss her delivery with Dr Aviles.

## 2018-03-13 NOTE — PROGRESS NOTES
Indication  ========    sickle cell.    History  ======    General History  Other: BONY KATZ  Previous Outcomes   1  Para 0  Risk Factors  Details: Sickle Cell Anemia  Details: thyroid nodule    Pregnancy History  ==============    Maternal Lab Tests  Result:  [id number] Neg    Test: Quad/ Penta Screen  Result: Positive Quad Screen DSR 1:55  Wants to know gender: no    Maternal Assessment  =================    BP syst 118 mmHg  BP diast 67 mmHg    Pregnancy  =========    [facility] pregnancy. Number of fetuses: 1.    Dating  ======    Cycle: regular cycle  Assigned: Dating performed on 10/10/2017, based on ultrasound (CRL)  Assigned GA 33 w + 4 d  Assigned KAYLEE: 2018    Impression  =========    discussed tracing w Dr Cordero. Pt to [facility] for prolonged monitoring and MD notified.    NST reactive with late decels.    Recommendation  ==============    To [facility] for monitoring.  Continue fetal surveillance as previously outlined.

## 2018-03-15 ENCOUNTER — HOSPITAL ENCOUNTER (OUTPATIENT)
Dept: RADIOLOGY | Facility: OTHER | Age: 33
Discharge: HOME | End: 2018-03-15
Attending: OBSTETRICS & GYNECOLOGY
Payer: COMMERCIAL

## 2018-03-15 ENCOUNTER — HOSPITAL ENCOUNTER (OUTPATIENT)
Dept: PERINATAL CARE | Facility: OTHER | Age: 33
Discharge: HOME | End: 2018-03-15
Attending: OBSTETRICS & GYNECOLOGY
Payer: COMMERCIAL

## 2018-03-15 DIAGNOSIS — D57.20 SICKLE CELL-HEMOGLOBIN C DISEASE WITHOUT CRISIS: ICD-10-CM

## 2018-03-15 DIAGNOSIS — R10.11 RUQ PAIN: ICD-10-CM

## 2018-03-15 DIAGNOSIS — O36.8130 DECREASED FETAL MOVEMENTS IN THIRD TRIMESTER, SINGLE OR UNSPECIFIED FETUS: ICD-10-CM

## 2018-03-15 PROCEDURE — 76705 ECHO EXAM OF ABDOMEN: CPT | Mod: 26,,, | Performed by: RADIOLOGY

## 2018-03-15 PROCEDURE — 59025 FETAL NON-STRESS TEST: CPT | Mod: 26,,, | Performed by: OBSTETRICS & GYNECOLOGY

## 2018-03-15 PROCEDURE — 76705 ECHO EXAM OF ABDOMEN: CPT | Mod: TC

## 2018-03-15 PROCEDURE — 59025 FETAL NON-STRESS TEST: CPT

## 2018-03-19 ENCOUNTER — HOSPITAL ENCOUNTER (OUTPATIENT)
Dept: PERINATAL CARE | Facility: OTHER | Age: 33
Discharge: HOME | End: 2018-03-19
Attending: OBSTETRICS & GYNECOLOGY
Payer: COMMERCIAL

## 2018-03-19 DIAGNOSIS — O36.8130 DECREASED FETAL MOVEMENTS IN THIRD TRIMESTER, SINGLE OR UNSPECIFIED FETUS: ICD-10-CM

## 2018-03-19 DIAGNOSIS — D57.20 SICKLE CELL-HEMOGLOBIN C DISEASE WITHOUT CRISIS: ICD-10-CM

## 2018-03-19 PROCEDURE — 59025 FETAL NON-STRESS TEST: CPT | Mod: 26,,, | Performed by: PEDIATRICS

## 2018-03-19 PROCEDURE — 59025 FETAL NON-STRESS TEST: CPT

## 2018-03-19 PROCEDURE — 76815 OB US LIMITED FETUS(S): CPT | Mod: 26,,, | Performed by: PEDIATRICS

## 2018-03-19 PROCEDURE — 76815 OB US LIMITED FETUS(S): CPT

## 2018-03-20 NOTE — PROGRESS NOTES
Indication  ========    sickle cell.    History  ======    General History  Other: BONY KATZ  Previous Outcomes   1  Para 0  Risk Factors  Details: Sickle Cell Anemia  Details: thyroid nodule    Maternal Assessment  =================    BP syst 112 mmHg  BP diast 58 mmHg    Method  ======    Transabdominal ultrasound examination. View: Sufficient.    Pregnancy  =========    [place] pregnancy. Number of fetuses: 1.    Dating  ======    Cycle: regular cycle  Assigned: Dating performed on 10/10/2017, based on ultrasound ([facility])  Assigned GA 34 w + 4 d  Assigned KAYLEE: 2018    General Evaluation  ==============    Cardiac activity: present.  bpm.  Fetal movements: visualized.  Presentation: cephalic.  Placenta:  Placental site: anterior.  Umbilical cord: Cord vessels: 3 vessel cord.    Non Stress Test  =============    NST interpretation: reactive. Test duration 23 min. Baseline  bpm. Baseline variability: moderate. Accelerations: present.  Decelerations: absent. Uterine activity: present, x 1 ctx. Acoustic stimulation: no  reports + fetal movement, denies ctx, vag bleeding or loss of fluid    Amniotic Fluid Assessment  =====================    Amount of AF: normal amount  MVP 6.3 cm          Impression  =========    NST R  MVP normal.    Recommendation  ==============    Continue fetal surveillance as previously outlined.

## 2018-03-21 ENCOUNTER — ANESTHESIA (OUTPATIENT)
Dept: OBSTETRICS AND GYNECOLOGY | Facility: OTHER | Age: 33
End: 2018-03-21
Payer: COMMERCIAL

## 2018-03-21 ENCOUNTER — ANESTHESIA EVENT (OUTPATIENT)
Dept: OBSTETRICS AND GYNECOLOGY | Facility: OTHER | Age: 33
End: 2018-03-21
Payer: COMMERCIAL

## 2018-03-21 ENCOUNTER — TELEPHONE (OUTPATIENT)
Dept: OBSTETRICS AND GYNECOLOGY | Facility: CLINIC | Age: 33
End: 2018-03-21

## 2018-03-21 ENCOUNTER — HOSPITAL ENCOUNTER (INPATIENT)
Facility: OTHER | Age: 33
LOS: 3 days | Discharge: HOME | End: 2018-03-24
Attending: OBSTETRICS & GYNECOLOGY | Admitting: OBSTETRICS & GYNECOLOGY
Payer: COMMERCIAL

## 2018-03-21 DIAGNOSIS — O42.90 PREMATURE RUPTURE OF MEMBRANES: ICD-10-CM

## 2018-03-21 DIAGNOSIS — Z3A.34 34 WEEKS GESTATION OF PREGNANCY: ICD-10-CM

## 2018-03-21 DIAGNOSIS — O42.90 PREMATURE RUPTURE OF MEMBRANES, UNSPECIFIED DURATION TO ONSET OF LABOR, UNSPECIFIED GESTATIONAL AGE: Primary | ICD-10-CM

## 2018-03-21 LAB
ABO + RH BLD: NORMAL
BASOPHILS # BLD AUTO: 0.03 K/UL
BASOPHILS NFR BLD: 0.2 %
BLD GP AB SCN CELLS X3 SERPL QL: NORMAL
DIFFERENTIAL METHOD: ABNORMAL
EOSINOPHIL # BLD AUTO: 0.1 K/UL
EOSINOPHIL NFR BLD: 0.5 %
ERYTHROCYTE [DISTWIDTH] IN BLOOD BY AUTOMATED COUNT: 15.1 %
HCT VFR BLD AUTO: 29.6 %
HGB BLD-MCNC: 10.3 G/DL
HIV1+2 IGG SERPL QL IA.RAPID: NEGATIVE
LYMPHOCYTES # BLD AUTO: 2.7 K/UL
LYMPHOCYTES NFR BLD: 15.5 %
MCH RBC QN AUTO: 31.6 PG
MCHC RBC AUTO-ENTMCNC: 34.8 G/DL
MCV RBC AUTO: 91 FL
MONOCYTES # BLD AUTO: 1.4 K/UL
MONOCYTES NFR BLD: 8.2 %
NEUTROPHILS # BLD AUTO: 13.1 K/UL
NEUTROPHILS NFR BLD: 75 %
PLATELET # BLD AUTO: 452 K/UL
PMV BLD AUTO: 11.3 FL
RBC # BLD AUTO: 3.26 M/UL
RPR SER QL: NORMAL
RUPTURE OF MEMBRANE: POSITIVE
WBC # BLD AUTO: 17.47 K/UL

## 2018-03-21 PROCEDURE — 10907ZC DRAINAGE OF AMNIOTIC FLUID, THERAPEUTIC FROM PRODUCTS OF CONCEPTION, VIA NATURAL OR ARTIFICIAL OPENING: ICD-10-PCS | Performed by: OBSTETRICS & GYNECOLOGY

## 2018-03-21 PROCEDURE — 3E0P7VZ INTRODUCTION OF HORMONE INTO FEMALE REPRODUCTIVE, VIA NATURAL OR ARTIFICIAL OPENING: ICD-10-PCS | Performed by: OBSTETRICS & GYNECOLOGY

## 2018-03-21 PROCEDURE — 59025 FETAL NON-STRESS TEST: CPT | Mod: 26,,, | Performed by: OBSTETRICS & GYNECOLOGY

## 2018-03-21 PROCEDURE — 62326 NJX INTERLAMINAR LMBR/SAC: CPT | Performed by: ANESTHESIOLOGY

## 2018-03-21 PROCEDURE — 27800517 HC TRAY,EPIDURAL-CONTINUOUS: Performed by: STUDENT IN AN ORGANIZED HEALTH CARE EDUCATION/TRAINING PROGRAM

## 2018-03-21 PROCEDURE — 25000003 PHARM REV CODE 250: Performed by: STUDENT IN AN ORGANIZED HEALTH CARE EDUCATION/TRAINING PROGRAM

## 2018-03-21 PROCEDURE — 86850 RBC ANTIBODY SCREEN: CPT

## 2018-03-21 PROCEDURE — 99285 EMERGENCY DEPT VISIT HI MDM: CPT | Mod: 25

## 2018-03-21 PROCEDURE — 85025 COMPLETE CBC W/AUTO DIFF WBC: CPT

## 2018-03-21 PROCEDURE — 99284 EMERGENCY DEPT VISIT MOD MDM: CPT | Mod: 25,,, | Performed by: OBSTETRICS & GYNECOLOGY

## 2018-03-21 PROCEDURE — 63600175 PHARM REV CODE 636 W HCPCS: Performed by: STUDENT IN AN ORGANIZED HEALTH CARE EDUCATION/TRAINING PROGRAM

## 2018-03-21 PROCEDURE — 59025 FETAL NON-STRESS TEST: CPT

## 2018-03-21 PROCEDURE — 84112 EVAL AMNIOTIC FLUID PROTEIN: CPT

## 2018-03-21 PROCEDURE — 4A0HXCZ MEASUREMENT OF PRODUCTS OF CONCEPTION, CARDIAC RATE, EXTERNAL APPROACH: ICD-10-PCS | Performed by: OBSTETRICS & GYNECOLOGY

## 2018-03-21 PROCEDURE — 86592 SYPHILIS TEST NON-TREP QUAL: CPT

## 2018-03-21 PROCEDURE — 3E033VJ INTRODUCTION OF OTHER HORMONE INTO PERIPHERAL VEIN, PERCUTANEOUS APPROACH: ICD-10-PCS | Performed by: OBSTETRICS & GYNECOLOGY

## 2018-03-21 PROCEDURE — 27200710 HC EPIDURAL INFUSION PUMP SET: Performed by: STUDENT IN AN ORGANIZED HEALTH CARE EDUCATION/TRAINING PROGRAM

## 2018-03-21 PROCEDURE — 59400 OBSTETRICAL CARE: CPT | Mod: QY,,, | Performed by: ANESTHESIOLOGY

## 2018-03-21 PROCEDURE — 86703 HIV-1/HIV-2 1 RESULT ANTBDY: CPT | Mod: 91

## 2018-03-21 PROCEDURE — 11000001 HC ACUTE MED/SURG PRIVATE ROOM

## 2018-03-21 PROCEDURE — 86703 HIV-1/HIV-2 1 RESULT ANTBDY: CPT

## 2018-03-21 PROCEDURE — 87081 CULTURE SCREEN ONLY: CPT

## 2018-03-21 RX ORDER — LIDOCAINE HYDROCHLORIDE AND EPINEPHRINE 15; 5 MG/ML; UG/ML
INJECTION, SOLUTION EPIDURAL
Status: DISCONTINUED | OUTPATIENT
Start: 2018-03-21 | End: 2018-03-22

## 2018-03-21 RX ORDER — CARBOPROST TROMETHAMINE 250 UG/ML
250 INJECTION, SOLUTION INTRAMUSCULAR
Status: DISCONTINUED | OUTPATIENT
Start: 2018-03-21 | End: 2018-03-24

## 2018-03-21 RX ORDER — FENTANYL/BUPIVACAINE/NS/PF 2MCG/ML-.1
PLASTIC BAG, INJECTION (ML) INJECTION CONTINUOUS PRN
Status: DISCONTINUED | OUTPATIENT
Start: 2018-03-21 | End: 2018-03-22

## 2018-03-21 RX ORDER — SODIUM CHLORIDE 9 MG/ML
INJECTION, SOLUTION INTRAVENOUS
Status: DISCONTINUED | OUTPATIENT
Start: 2018-03-21 | End: 2018-03-24

## 2018-03-21 RX ORDER — METHYLERGONOVINE MALEATE 0.2 MG/ML
200 INJECTION INTRAVENOUS
Status: DISCONTINUED | OUTPATIENT
Start: 2018-03-21 | End: 2018-03-24

## 2018-03-21 RX ORDER — TERBUTALINE SULFATE 1 MG/ML
0.25 INJECTION SUBCUTANEOUS
Status: DISCONTINUED | OUTPATIENT
Start: 2018-03-21 | End: 2018-03-24

## 2018-03-21 RX ORDER — ONDANSETRON 8 MG/1
8 TABLET, ORALLY DISINTEGRATING ORAL EVERY 8 HOURS PRN
Status: DISCONTINUED | OUTPATIENT
Start: 2018-03-21 | End: 2018-03-24

## 2018-03-21 RX ORDER — BETAMETHASONE SODIUM PHOSPHATE AND BETAMETHASONE ACETATE 3; 3 MG/ML; MG/ML
12 INJECTION, SUSPENSION INTRA-ARTICULAR; INTRALESIONAL; INTRAMUSCULAR; SOFT TISSUE ONCE
Status: COMPLETED | OUTPATIENT
Start: 2018-03-21 | End: 2018-03-21

## 2018-03-21 RX ORDER — ACETAMINOPHEN 325 MG/1
650 TABLET ORAL EVERY 6 HOURS PRN
Status: DISCONTINUED | OUTPATIENT
Start: 2018-03-21 | End: 2018-03-24

## 2018-03-21 RX ORDER — OXYTOCIN/RINGER'S LACTATE 20/1000 ML
41.65 PLASTIC BAG, INJECTION (ML) INTRAVENOUS CONTINUOUS
Status: ACTIVE | OUTPATIENT
Start: 2018-03-21 | End: 2018-03-21

## 2018-03-21 RX ORDER — TERBUTALINE SULFATE 1 MG/ML
0.25 INJECTION SUBCUTANEOUS ONCE
Status: COMPLETED | OUTPATIENT
Start: 2018-03-21 | End: 2018-03-21

## 2018-03-21 RX ORDER — SODIUM CITRATE AND CITRIC ACID MONOHYDRATE 334; 500 MG/5ML; MG/5ML
30 SOLUTION ORAL ONCE
Status: DISCONTINUED | OUTPATIENT
Start: 2018-03-21 | End: 2018-03-24

## 2018-03-21 RX ORDER — OXYTOCIN/RINGER'S LACTATE 20/1000 ML
2 PLASTIC BAG, INJECTION (ML) INTRAVENOUS CONTINUOUS
Status: DISCONTINUED | OUTPATIENT
Start: 2018-03-21 | End: 2018-03-22

## 2018-03-21 RX ORDER — FENTANYL/BUPIVACAINE/NS/PF 2MCG/ML-.1
PLASTIC BAG, INJECTION (ML) INJECTION
Status: DISPENSED
Start: 2018-03-21 | End: 2018-03-22

## 2018-03-21 RX ORDER — SODIUM CHLORIDE, SODIUM LACTATE, POTASSIUM CHLORIDE, CALCIUM CHLORIDE 600; 310; 30; 20 MG/100ML; MG/100ML; MG/100ML; MG/100ML
INJECTION, SOLUTION INTRAVENOUS CONTINUOUS
Status: DISCONTINUED | OUTPATIENT
Start: 2018-03-21 | End: 2018-03-22

## 2018-03-21 RX ORDER — MISOPROSTOL 200 UG/1
600 TABLET ORAL
Status: DISCONTINUED | OUTPATIENT
Start: 2018-03-21 | End: 2018-03-24

## 2018-03-21 RX ORDER — FAMOTIDINE 10 MG/ML
20 INJECTION INTRAVENOUS ONCE
Status: DISCONTINUED | OUTPATIENT
Start: 2018-03-21 | End: 2018-03-24

## 2018-03-21 RX ADMIN — MISOPROSTOL 50 MCG: 100 TABLET ORAL at 04:03

## 2018-03-21 RX ADMIN — BETAMETHASONE SODIUM PHOSPHATE AND BETAMETHASONE ACETATE 12 MG: 3; 3 INJECTION, SUSPENSION INTRA-ARTICULAR; INTRALESIONAL; INTRAMUSCULAR at 01:03

## 2018-03-21 RX ADMIN — SODIUM CHLORIDE, POTASSIUM CHLORIDE, SODIUM LACTATE AND CALCIUM CHLORIDE 1000 ML: 600; 310; 30; 20 INJECTION, SOLUTION INTRAVENOUS at 05:03

## 2018-03-21 RX ADMIN — SODIUM CHLORIDE, POTASSIUM CHLORIDE, SODIUM LACTATE AND CALCIUM CHLORIDE: 600; 310; 30; 20 INJECTION, SOLUTION INTRAVENOUS at 01:03

## 2018-03-21 RX ADMIN — DEXTROSE 2.5 MILLION UNITS: 50 INJECTION, SOLUTION INTRAVENOUS at 09:03

## 2018-03-21 RX ADMIN — Medication 5 MILLION UNITS: at 01:03

## 2018-03-21 RX ADMIN — TERBUTALINE SULFATE 0.25 MG: 1 INJECTION, SOLUTION SUBCUTANEOUS at 05:03

## 2018-03-21 RX ADMIN — LIDOCAINE HYDROCHLORIDE,EPINEPHRINE BITARTRATE 3 ML: 15; .005 INJECTION, SOLUTION EPIDURAL; INFILTRATION; INTRACAUDAL; PERINEURAL at 05:03

## 2018-03-21 RX ADMIN — DEXTROSE 2.5 MILLION UNITS: 50 INJECTION, SOLUTION INTRAVENOUS at 06:03

## 2018-03-21 RX ADMIN — Medication 10 ML/HR: at 05:03

## 2018-03-21 RX ADMIN — Medication 2 MILLI-UNITS/MIN: at 09:03

## 2018-03-21 RX ADMIN — ONDANSETRON 8 MG: 8 TABLET, ORALLY DISINTEGRATING ORAL at 02:03

## 2018-03-21 NOTE — H&P
[facility]  Obstetrics  History & Physical    Patient Name: Gautam Sotomayor  MRN: [id number]  Admission Date: 3/21/2018  Primary Care Provider: Donna Akhtar MD    Subjective:     Principal Problem:Premature rupture of membranes    History of Present Illness:  Gautam Sotomayor is a 32 y.o. [id number] at 34w6d presents complaining of leakage of fluid since 6AM on 3/20/18. Patient states she has been having clear fluid leak. Denies any contractions, vaginal bleeding. Confirms good fetal movement. This IUP is complicated by Sickle Cell Disease (type Sc), HSV2, and GBS unknown. Patient denies any current HSV outbreak or flu like symptoms.     Obstetric HPI:  Obstetric History       T0      L0     SAB0   TAB0   Ectopic0   Multiple0   Live Births0       # Outcome Date GA Lbr Bro/2nd Weight Sex Delivery Anes PTL Lv   1 Current                 Past Medical History:   Diagnosis Date    Abnormal Pap smear of cervix ,     colpo    Allergic rhinitis     Anemia     Gallstone     Genital herpes 2012    Outbreaks 1-2 per year, last outbreak 2017    Sickle cell disease, type SC     Last crisis 3/2017     Past Surgical History:   Procedure Laterality Date    APPENDECTOMY      FOOT SURGERY Left     SPLENECTOMY, TOTAL      WISDOM TOOTH EXTRACTION         PTA Medications   Medication Sig    aspirin 81 MG Chew Take 81 mg by mouth once daily.    cetirizine (ZYRTEC) 5 MG tablet Take 1 tablet (5 mg total) by mouth once daily.    folic acid (FOLVITE) 1 MG tablet Take 4 tablets (4 mg total) by mouth once daily.    ondansetron (ZOFRAN, AS HYDROCHLORIDE,) 4 MG tablet Take 1 tablet (4 mg total) by mouth daily as needed for Nausea.    PNV NO.95/FERROUS FUM/FOLIC AC (PRENATAL ORAL) Take by mouth.    prenatal 25-iron-folate 6-dha 30 mg iron-1mg -200 mg Cap Take 1 tablet by mouth once daily.    simethicone (GAS-X) 80 MG chewable tablet Take 1 tablet (80 mg total) by mouth every 6  (six) hours as needed for Flatulence.    valACYclovir (VALTREX) 1000 MG tablet Take 1 tablet (1,000 mg total) by mouth once daily.       Review of patient's allergies indicates:   Allergen Reactions    Nickel Rash        Family History     None        Social History Main Topics    Smoking status: Current Every Day Smoker     Packs/day: 0.25     Years: 10.00    Smokeless tobacco: Never Used    Alcohol use 0.6 oz/week     1 Cans of beer per week      Comment: socially    Drug use: No    Sexual activity: Yes     Partners: Male     Birth control/ protection: None, Condom     Review of Systems   Constitutional: Negative for fever.   Respiratory: Negative for cough and shortness of breath.    Cardiovascular: Negative for leg swelling.   Gastrointestinal: Negative for abdominal pain (contractions) and vomiting.   Genitourinary: Positive for vaginal discharge. Negative for vaginal bleeding.   Neurological: Negative for headaches.   Hematological: Does not bruise/bleed easily.      Objective:     Vital Signs (Most Recent):  Temp: 98.7 °F (37.1 °C) (03/21/18 1130)  Resp: 18 (03/21/18 1130) Vital Signs (24h Range):  Temp:  [98.7 °F (37.1 °C)] 98.7 °F (37.1 °C)  Resp:  [18] 18        There is no height or weight on file to calculate BMI.    FHT: 155 Cat 1 (reassuring)  TOCO:  Q 5 minutes    Physical Exam:   Constitutional: She is oriented to person, place, and time. She appears well-developed and well-nourished. No distress.    HENT:   Head: Normocephalic and atraumatic.      Cardiovascular: Normal rate and regular rhythm.     Pulmonary/Chest: Effort normal. No respiratory distress.        Abdominal: Soft. Bowel sounds are normal. She exhibits no distension. There is no tenderness.   Gravid               Musculoskeletal: Normal range of motion. She exhibits no edema.       Neurological: She is alert and oriented to person, place, and time.    Skin: Skin is warm and dry.    Psychiatric: She has a normal mood and affect.  Her behavior is normal. Judgment and thought content normal.       Cervix:  Dilation:  1  Effacement:  70  Station: -3  Presentation: Vertex     Significant Labs:  Lab Results   Component Value Date    GROUPTRH A POS 2017    HEPBSAG Negative 2017       CBC: No results for input(s): WBC, RBC, HGB, HCT, PLT, MCV, MCH, MCHC in the last 48 hours.  CMP: No results for input(s): GLU, CALCIUM, ALBUMIN, PROT, NA, K, CO2, CL, BUN, CREATININE, ALKPHOS, ALT, AST, BILITOT in the last 48 hours.  HIV: No results for input(s): PLF30QRTP in the last 48 hours.    Assessment/Plan:     32 y.o. 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

## 2018-03-21 NOTE — PROGRESS NOTES
LABOR PROGRESS NOTE    S:  MD to bedside due to prolonged deceleratiin. Complaints: Yes - abdominal pain from contraction    O: Temp:  [98.4 °F (36.9 °C)-99.6 °F (37.6 °C)] 99.5 °F (37.5 °C)  Pulse:  [] 105  Resp:  [16-18] 18  SpO2:  [98 %-99 %] 99 %  BP: (117-128)/(59-71) 117/60      FHT: 140 BPM/mod beat to beat variability/+ accels/prolonged decel to 70s. Terbutaline administered with return to baseline  SVE: /-3        ASSESSMENT:   32 y.o.  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         PLAN:    Labor management  Continue Close Maternal/Fetal Monitoring.   S/p one dose cytotec  Recheck 4 hours or PRN      Valerio Higgins MD

## 2018-03-21 NOTE — TELEPHONE ENCOUNTER
Pt states that she has been having leakage since yesterday with a clear color but thick . Pt was advised to come into labor and delivery for evaluation. I called and spoke to labor and delivery and informed them that the pt was on her way in from work.

## 2018-03-21 NOTE — ASSESSMENT & PLAN NOTE
- PPROM clear fluid at 0600 on 3/20/18  - andra irregularly   - no signs of chorioamnionitis  - afebrile  - NST reactive and reassuring  - denies any fever in the past few days  - Will plan IOL  - PCN for GBS unknown status  - will give dose of rescue steroids  - fever precautions

## 2018-03-21 NOTE — ANESTHESIA PREPROCEDURE EVALUATION
2018  [facility]  Anesthesia Pre-Operative Evaluation         Patient Name: Gautam Sotomayor  YOB: 1985  MRN: [id number]    SUBJECTIVE:     Pre-operative evaluation for * No surgery found *     2018    Gautam Sotomayor is a 32 y.o. female  w/ a significant PMHx of arthritis, sickle cell s/p splenectomy, HSV who presents for the above procedure.    LDA:   Right hand 18g PIV    Prev airway: None documented.    Drips: None documented.    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      Current Outpatient Prescriptions on File Prior to Encounter   Medication Sig Dispense Refill    aspirin 81 MG Chew Take 81 mg by mouth once daily.      cetirizine (ZYRTEC) 5 MG tablet Take 1 tablet (5 mg total) by mouth once daily. 30 tablet 2    folic acid (FOLVITE) 1 MG tablet Take 4 tablets (4 mg total) by mouth once daily. 120 tablet 6    ondansetron (ZOFRAN, AS HYDROCHLORIDE,) 4 MG tablet Take 1 tablet (4 mg total) by mouth daily as needed for Nausea. 30 tablet 1    PNV NO.95/FERROUS FUM/FOLIC AC (PRENATAL ORAL) Take by mouth.      prenatal 25-iron-folate  6-dha 30 mg iron-1mg -200 mg Cap Take 1 tablet by mouth once daily. 30 capsule 11    simethicone (GAS-X) 80 MG chewable tablet Take 1 tablet (80 mg total) by mouth every 6 (six) hours as needed for Flatulence.  0    valACYclovir (VALTREX) 1000 MG tablet Take 1 tablet (1,000 mg total) by mouth once daily. 30 tablet 3       Past Surgical History:   Procedure Laterality Date    APPENDECTOMY      FOOT SURGERY Left     SPLENECTOMY, TOTAL      WISDOM TOOTH EXTRACTION         Social History     Social History    Marital status: Single     Spouse name: N/A    Number of children: N/A    Years of education: N/A     Occupational History    Not on file.     Social History Main Topics    Smoking status: Current Every Day Smoker     Packs/day: 0.25     Years: 10.00    Smokeless tobacco: Never Used    Alcohol use 0.6 oz/week     1 Cans of beer per week      Comment: socially    Drug use: No    Sexual activity: Yes     Partners: Male     Birth control/ protection: None, Condom     Other Topics Concern    Not on file     Social History Narrative    No narrative on file       OBJECTIVE:     Vital Signs Range (Last 24H):  Temp:  [37.1 °C (98.7 °F)]   Resp:  [18]       CBC:   No results for input(s): WBC, RBC, HGB, HCT, PLT, MCV, MCH, MCHC in the last 72 hours.    CMP: No results for input(s): NA, K, CL, CO2, BUN, CREATININE, GLU, MG, PHOS, CALCIUM, ALBUMIN, PROT, ALKPHOS, ALT, AST, BILITOT in the last 72 hours.    INR:  No results for input(s): PT, INR, PROTIME, APTT in the last 72 hours.    Diagnostic Studies: No relevant studies.    EKG: No recent studies available.    2D ECHO:  Results for orders placed or performed during the hospital encounter of 02/15/18   2D echo with color flow doppler   Result Value Ref Range    EF 65 55 - 65    Diastolic Dysfunction No     Est. PA Systolic Pressure 31.3     Tricuspid Valve Regurgitation MILD          ASSESSMENT/PLAN:         Anesthesia Evaluation    I have reviewed the  Patient Summary Reports.     I have reviewed the Medications.     Review of Systems  Anesthesia Hx:  Denies Hx of Anesthetic complications  History of prior surgery of interest to airway management or planning: Previous anesthesia: General Denies Family Hx of Anesthesia complications.   Denies Personal Hx of Anesthesia complications.   Hematology/Oncology:     Oncology Normal     EENT/Dental:EENT/Dental Normal   Cardiovascular:  Cardiovascular Normal Exercise tolerance: good  Denies Hypertension.         Pulmonary:  Pulmonary Normal  Denies Asthma.    Renal/:  Renal/ Normal  Denies Chronic Renal Disease.     Hepatic/GI:  Hepatic/GI Normal    Musculoskeletal:  Musculoskeletal Normal    Neurological:  Neurology Normal Denies TIA.  Denies CVA. Denies Seizures.    Endocrine:  Endocrine Normal Denies Diabetes.    Dermatological:  Skin Normal    Psych:  Psychiatric Normal           Physical Exam  General:  Well nourished    Airway/Jaw/Neck:  Airway Findings: Mouth Opening: Normal Tongue: Normal  Mallampati: III  TM Distance: Normal, at least 6 cm         Dental:  Dental Findings: In tact   Chest/Lungs:  Chest/Lungs Clear    Heart/Vascular:  Heart Findings: Normal       Mental Status:  Mental Status Findings:  Cooperative, Alert and Oriented         Anesthesia Plan  Type of Anesthesia, risks & benefits discussed:  Anesthesia Type:  general, spinal, CSE, epidural  Patient's Preference:   Intra-op Monitoring Plan: standard ASA monitors  Intra-op Monitoring Plan Comments:   Post Op Pain Control Plan: multimodal analgesia  Post Op Pain Control Plan Comments:   Induction:    Beta Blocker:  Patient is not currently on a Beta-Blocker (No further documentation required).       Informed Consent: Patient understands risks and agrees with Anesthesia plan.  Questions answered. Anesthesia consent signed with patient.  ASA Score: 2     Day of Surgery Review of History & Physical:    H&P update referred to the provider.         Ready  For Surgery From Anesthesia Perspective.

## 2018-03-21 NOTE — ED PROVIDER NOTES
Encounter Date: 3/21/2018       History     Chief Complaint   Patient presents with    Rupture of Membranes     Gautam Sotomayor is a 32 y.o. [id number] at 34w6d presents complaining of leakage of fluid since 6AM on 3/20/18. Patient states she has been having clear fluid leak. Denies any contractions, vaginal bleeding. Confirms good fetal movement. This IUP is complicated by Sickle Cell Disease (type SC), HSV2, and GBS unknown. Patient denies any current HSV outbreak or flu like symptoms.         Review of patient's allergies indicates:   Allergen Reactions    Nickel Rash     Past Medical History:   Diagnosis Date    Abnormal Pap smear of cervix ,     colpo    Allergic rhinitis     Anemia     Gallstone     Genital herpes 2012    Outbreaks 1-2 per year, last outbreak 2017    Sickle cell disease, type SC     Last crisis 3/2017     Past Surgical History:   Procedure Laterality Date    APPENDECTOMY      FOOT SURGERY Left     SPLENECTOMY, TOTAL      WISDOM TOOTH EXTRACTION       Family History   Problem Relation Age of Onset    Breast cancer Neg Hx     Colon cancer Neg Hx     Ovarian cancer Neg Hx      Social History   Substance Use Topics    Smoking status: Current Every Day Smoker     Packs/day: 0.25     Years: 10.00    Smokeless tobacco: Never Used    Alcohol use 0.6 oz/week     1 Cans of beer per week      Comment: socially     Review of Systems   Constitutional: Negative for chills, fatigue and fever.   HENT: Negative for congestion.    Eyes: Negative for visual disturbance.   Respiratory: Negative for cough and shortness of breath.    Cardiovascular: Negative for chest pain and palpitations.   Gastrointestinal: Negative for abdominal distention, abdominal pain, constipation, diarrhea, nausea and vomiting.   Genitourinary: Positive for vaginal discharge. Negative for difficulty urinating, dysuria, hematuria and vaginal bleeding.   Skin: Negative for rash.   Neurological: Negative for  dizziness, seizures, light-headedness and headaches.   Hematological: Does not bruise/bleed easily.   Psychiatric/Behavioral: Negative for dysphoric mood. The patient is not nervous/anxious.        Physical Exam     Initial Vitals   BP Pulse Resp Temp SpO2   -- -- -- -- --      MAP       --         Physical Exam    Vitals reviewed.  Constitutional: She appears well-developed and well-nourished. She is not diaphoretic. No distress.   Cardiovascular: Normal rate, regular rhythm, normal heart sounds and intact distal pulses.   Pulmonary/Chest: Breath sounds normal. No respiratory distress.   Abdominal: Soft. She exhibits no distension. There is no tenderness.   Gravid     Musculoskeletal: She exhibits no edema.   Neurological: She is alert and oriented to person, place, and time. She has normal strength. No sensory deficit.   Skin: Skin is warm and dry.   Psychiatric: She has a normal mood and affect. Her behavior is normal. Judgment and thought content normal.     OB LABOR EXAM:   Pre-Term Labor: No.   Membranes ruptured: Yes.   Method: Sterile speculum exam per MD and Sterile vaginal exam per MD.   Vaginal Bleeding: none present.     Dilatation: 1.   Station: -3.   Effacement: 70%.   Amniotic Fluid Color: clear.   Amniotic Fluid Amount: small.   Comments: Scant fluid seen  Cervix with no herpetic lesion seen        ED Course   Obtain Fetal nonstress test (NST)  Date/Time: 3/21/2018 12:16 PM  Performed by: VINEET CHAUDHRY  Authorized by: VINEET CHAUDHRY     Nonstress Test:     Variability:  6-25 BPM    Decelerations:  None    Accelerations:  15 bpm    Baseline:  155    Contractions:  Not present  Biophysical Profile:     Nonstress Test Interpretation: reactive      Overall Impression:  Reassuring          Labs Reviewed   STREP B SCREEN, VAGINAL / RECTAL   RUPTURE OF MEMBRANE             Medical Decision Making:   ED Management:  - +pooling, ferning equivalent  - ROM plus positive  - HSV+ but Negative speculum exam, OK  for vaginal delivery, no prodromal symptoms  - Admit to L&D for IOL  - Late  steroids  - PCN for GBS unknown              Attending Attestation:   Physician Attestation Statement for Resident:  As the supervising MD   Physician Attestation Statement: I have personally seen and examined this patient.   I agree with the above history. -:   As the supervising MD I agree with the above PE.    As the supervising MD I agree with the above treatment, course, plan, and disposition.  I was personally present during the critical portions of the procedure(s) performed by the resident and was immediately available in the ED to provide services and assistance as needed during the entire procedure.  I have reviewed and agree with the residents interpretation of the following: rhythm strips and lab data.  I have reviewed the following: old records at this facility.                       Clinical Impression:   The encounter diagnosis was Premature rupture of membranes, unspecified duration to onset of labor, unspecified gestational age.                           Divya Morales MD  [phone]

## 2018-03-21 NOTE — Clinical Note
I certify that Inpatient services for greater than or equal to 2 midnights are medically necessary:: Yes   Transfer To (Destination): [facility] LABOR AND DELIVERY [[id number]]   Diagnosis: Premature rupture of membranes, unspecified duration to onset of labor, unspecified gestational age [[id number]]   Admitting Provider:: BONY KATZ [[id number]]   Future Attending Provider: BONY KATZ [[id number]]   Bed Type Preference: Standard [6]   Reason for IP Medical Treatment  (Clinical interventions that can only be accomplished in the IP setting? ) :: premature rupture of membranes   Estimated Length of Stay:: 3-4 midnights   Plans for Post-Acute care--if anticipated (pick the single best option):: A. No post acute care anticipated at this time   Special Needs:: No Special Needs [1]

## 2018-03-21 NOTE — HOSPITAL COURSE
2018 patient admitted to [facility] / to PPROM. Patient PROM, clear fluid @ 0600 on 3/20/18. Neg SSE. Denies prodromal symptoms. Plan to IOL with Cytotec. Will start PCN for GBS unknown status, GBS collected in [facility], pending. Will give dose of rescue BMZ.   2018 - Pt delivered via .   2018 - PPD#1 pt doing well this morning. No complaints.  3/24/18. PPD#2. Doing well, stable for discharge. Meeting all pp milestones.

## 2018-03-21 NOTE — ASSESSMENT & PLAN NOTE
- h/o of appendectomy and splenectomy  - last CBC was 10/30/258  - disease stable, follows with Heme/onc

## 2018-03-21 NOTE — SUBJECTIVE & OBJECTIVE
Obstetric HPI:  Obstetric History       T0      L0     SAB0   TAB0   Ectopic0   Multiple0   Live Births0       # Outcome Date GA Lbr Bro/2nd Weight Sex Delivery Anes PTL Lv   1 Current                 Past Medical History:   Diagnosis Date    Abnormal Pap smear of cervix ,     colpo    Allergic rhinitis     Anemia     Gallstone     Genital herpes 2012    Outbreaks 1-2 per year, last outbreak 2017    Sickle cell disease, type SC     Last crisis 3/2017     Past Surgical History:   Procedure Laterality Date    APPENDECTOMY      FOOT SURGERY Left     SPLENECTOMY, TOTAL      WISDOM TOOTH EXTRACTION         PTA Medications   Medication Sig    aspirin 81 MG Chew Take 81 mg by mouth once daily.    cetirizine (ZYRTEC) 5 MG tablet Take 1 tablet (5 mg total) by mouth once daily.    folic acid (FOLVITE) 1 MG tablet Take 4 tablets (4 mg total) by mouth once daily.    ondansetron (ZOFRAN, AS HYDROCHLORIDE,) 4 MG tablet Take 1 tablet (4 mg total) by mouth daily as needed for Nausea.    PNV NO.95/FERROUS FUM/FOLIC AC (PRENATAL ORAL) Take by mouth.    prenatal 25-iron-folate 6-dha 30 mg iron-1mg -200 mg Cap Take 1 tablet by mouth once daily.    simethicone (GAS-X) 80 MG chewable tablet Take 1 tablet (80 mg total) by mouth every 6 (six) hours as needed for Flatulence.    valACYclovir (VALTREX) 1000 MG tablet Take 1 tablet (1,000 mg total) by mouth once daily.       Review of patient's allergies indicates:   Allergen Reactions    Nickel Rash        Family History     None        Social History Main Topics    Smoking status: Current Every Day Smoker     Packs/day: 0.25     Years: 10.00    Smokeless tobacco: Never Used    Alcohol use 0.6 oz/week     1 Cans of beer per week      Comment: socially    Drug use: No    Sexual activity: Yes     Partners: Male     Birth control/ protection: None, Condom     Review of Systems   Constitutional: Negative for fever.   Respiratory: Negative for cough  and shortness of breath.    Cardiovascular: Negative for leg swelling.   Gastrointestinal: Negative for abdominal pain (contractions) and vomiting.   Genitourinary: Positive for vaginal discharge. Negative for vaginal bleeding.   Neurological: Negative for headaches.   Hematological: Does not bruise/bleed easily.      Objective:     Vital Signs (Most Recent):  Temp: 98.7 °F (37.1 °C) (03/21/18 1130)  Resp: 18 (03/21/18 1130) Vital Signs (24h Range):  Temp:  [98.7 °F (37.1 °C)] 98.7 °F (37.1 °C)  Resp:  [18] 18        There is no height or weight on file to calculate BMI.    FHT: 155 Cat 1 (reassuring)  TOCO:  Q 5 minutes    Physical Exam:   Constitutional: She is oriented to person, place, and time. She appears well-developed and well-nourished. No distress.    HENT:   Head: Normocephalic and atraumatic.      Cardiovascular: Normal rate and regular rhythm.     Pulmonary/Chest: Effort normal. No respiratory distress.        Abdominal: Soft. Bowel sounds are normal. She exhibits no distension. There is no tenderness.   Gravid               Musculoskeletal: Normal range of motion. She exhibits no edema.       Neurological: She is alert and oriented to person, place, and time.    Skin: Skin is warm and dry.    Psychiatric: She has a normal mood and affect. Her behavior is normal. Judgment and thought content normal.       Cervix:  Dilation:  1  Effacement:  70  Station: -3  Presentation: Vertex     Significant Labs:  Lab Results   Component Value Date    GROUPTRH A POS 09/18/2017    HEPBSAG Negative 11/16/2017       CBC: No results for input(s): WBC, RBC, HGB, HCT, PLT, MCV, MCH, MCHC in the last 48 hours.  CMP: No results for input(s): GLU, CALCIUM, ALBUMIN, PROT, NA, K, CO2, CL, BUN, CREATININE, ALKPHOS, ALT, AST, BILITOT in the last 48 hours.  HIV: No results for input(s): OXB32IFDF in the last 48 hours.

## 2018-03-21 NOTE — TELEPHONE ENCOUNTER
----- Message from Michelle Navas sent at 3/21/2018 10:09 AM CDT -----  _x  1st Request  _  2nd Request  _  3rd Request        Who: [email or web address]    Why: pt. Stating she is experiencing a clear heavy leakage since yesterday. Pt. Stating she is not pain. Pt. Would like to speak with dr. Aviles nurse. Please call to discuss    What Number to Call Back:[phone]      When to Expect a call back: (Within 24 hours)

## 2018-03-21 NOTE — HPI
Gautam Sotomayor is a 32 y.o. [id number] at 34w6d presents complaining of leakage of fluid since 6AM on 3/20/18. Patient states she has been having clear fluid leak. Denies any contractions, vaginal bleeding. Confirms good fetal movement. This IUP is complicated by Sickle Cell Disease (type Sc), HSV2, and GBS unknown. Patient denies any current HSV outbreak or flu like symptoms.

## 2018-03-22 ENCOUNTER — HOSPITAL ENCOUNTER (OUTPATIENT)
Dept: PERINATAL CARE | Facility: OTHER | Age: 33
Discharge: HOME | End: 2018-03-22
Attending: OBSTETRICS & GYNECOLOGY
Payer: COMMERCIAL

## 2018-03-22 ENCOUNTER — TELEPHONE (OUTPATIENT)
Dept: OBSTETRICS AND GYNECOLOGY | Facility: CLINIC | Age: 33
End: 2018-03-22

## 2018-03-22 LAB
ANISOCYTOSIS BLD QL SMEAR: SLIGHT
BASOPHILS # BLD AUTO: 0.04 K/UL
BASOPHILS NFR BLD: 0.2 %
DIFFERENTIAL METHOD: ABNORMAL
EOSINOPHIL # BLD AUTO: 0 K/UL
EOSINOPHIL NFR BLD: 0.1 %
ERYTHROCYTE [DISTWIDTH] IN BLOOD BY AUTOMATED COUNT: 15.4 %
HCT VFR BLD AUTO: 25.2 %
HGB BLD-MCNC: 9 G/DL
HIV 1+2 AB+HIV1 P24 AG SERPL QL IA: NEGATIVE
LYMPHOCYTES # BLD AUTO: 3.9 K/UL
LYMPHOCYTES NFR BLD: 16.1 %
MCH RBC QN AUTO: 32.6 PG
MCHC RBC AUTO-ENTMCNC: 35.7 G/DL
MCV RBC AUTO: 91 FL
MONOCYTES # BLD AUTO: 2.3 K/UL
MONOCYTES NFR BLD: 9.4 %
NEUTROPHILS # BLD AUTO: 17.9 K/UL
NEUTROPHILS NFR BLD: 73.5 %
PLATELET # BLD AUTO: 414 K/UL
PLATELET BLD QL SMEAR: ABNORMAL
PMV BLD AUTO: 10.7 FL
POIKILOCYTOSIS BLD QL SMEAR: SLIGHT
POLYCHROMASIA BLD QL SMEAR: ABNORMAL
RBC # BLD AUTO: 2.76 M/UL
SCHISTOCYTES BLD QL SMEAR: ABNORMAL
TARGETS BLD QL SMEAR: ABNORMAL
WBC # BLD AUTO: 24.31 K/UL

## 2018-03-22 PROCEDURE — 0KQM0ZZ REPAIR PERINEUM MUSCLE, OPEN APPROACH: ICD-10-PCS | Performed by: OBSTETRICS & GYNECOLOGY

## 2018-03-22 PROCEDURE — 63600175 PHARM REV CODE 636 W HCPCS: Performed by: STUDENT IN AN ORGANIZED HEALTH CARE EDUCATION/TRAINING PROGRAM

## 2018-03-22 PROCEDURE — 72200005 HC VAGINAL DELIVERY LEVEL II

## 2018-03-22 PROCEDURE — 25000003 PHARM REV CODE 250: Performed by: STUDENT IN AN ORGANIZED HEALTH CARE EDUCATION/TRAINING PROGRAM

## 2018-03-22 PROCEDURE — 11000001 HC ACUTE MED/SURG PRIVATE ROOM

## 2018-03-22 PROCEDURE — 10H07YZ INSERTION OF OTHER DEVICE INTO PRODUCTS OF CONCEPTION, VIA NATURAL OR ARTIFICIAL OPENING: ICD-10-PCS | Performed by: OBSTETRICS & GYNECOLOGY

## 2018-03-22 PROCEDURE — 51702 INSERT TEMP BLADDER CATH: CPT

## 2018-03-22 PROCEDURE — 27000181 HC CABLE, IUPC

## 2018-03-22 PROCEDURE — 62326 NJX INTERLAMINAR LMBR/SAC: CPT | Performed by: ANESTHESIOLOGY

## 2018-03-22 PROCEDURE — 72100003 HC LABOR CARE, EA. ADDL. 8 HRS

## 2018-03-22 PROCEDURE — 59400 OBSTETRICAL CARE: CPT | Mod: AT,,, | Performed by: OBSTETRICS & GYNECOLOGY

## 2018-03-22 PROCEDURE — 85025 COMPLETE CBC W/AUTO DIFF WBC: CPT

## 2018-03-22 PROCEDURE — 36415 COLL VENOUS BLD VENIPUNCTURE: CPT

## 2018-03-22 RX ORDER — METHYLERGONOVINE MALEATE 0.2 MG/ML
INJECTION INTRAVENOUS
Status: DISCONTINUED
Start: 2018-03-22 | End: 2018-03-22 | Stop reason: WASHOUT

## 2018-03-22 RX ORDER — OXYTOCIN/RINGER'S LACTATE 20/1000 ML
41.65 PLASTIC BAG, INJECTION (ML) INTRAVENOUS CONTINUOUS
Status: CANCELLED | OUTPATIENT
Start: 2018-03-22 | End: 2018-03-22

## 2018-03-22 RX ORDER — BUPIVACAINE HYDROCHLORIDE 2.5 MG/ML
INJECTION, SOLUTION EPIDURAL; INFILTRATION; INTRACAUDAL
Status: DISCONTINUED | OUTPATIENT
Start: 2018-03-22 | End: 2018-03-22

## 2018-03-22 RX ORDER — CARBOPROST TROMETHAMINE 250 UG/ML
INJECTION, SOLUTION INTRAMUSCULAR
Status: DISCONTINUED
Start: 2018-03-22 | End: 2018-03-22 | Stop reason: WASHOUT

## 2018-03-22 RX ORDER — TERBUTALINE SULFATE 1 MG/ML
INJECTION SUBCUTANEOUS
Status: DISCONTINUED
Start: 2018-03-22 | End: 2018-03-22 | Stop reason: WASHOUT

## 2018-03-22 RX ORDER — DIPHENHYDRAMINE HCL 25 MG
25 CAPSULE ORAL EVERY 4 HOURS PRN
Status: DISCONTINUED | OUTPATIENT
Start: 2018-03-22 | End: 2018-03-24 | Stop reason: HOSPADM

## 2018-03-22 RX ORDER — MISOPROSTOL 200 UG/1
TABLET ORAL
Status: DISCONTINUED
Start: 2018-03-22 | End: 2018-03-22 | Stop reason: WASHOUT

## 2018-03-22 RX ORDER — ACETAMINOPHEN 325 MG/1
650 TABLET ORAL EVERY 6 HOURS PRN
Status: DISCONTINUED | OUTPATIENT
Start: 2018-03-22 | End: 2018-03-24 | Stop reason: HOSPADM

## 2018-03-22 RX ORDER — LIDOCAINE HYDROCHLORIDE 10 MG/ML
INJECTION INFILTRATION; PERINEURAL
Status: DISCONTINUED
Start: 2018-03-22 | End: 2018-03-22 | Stop reason: WASHOUT

## 2018-03-22 RX ORDER — FENTANYL CITRATE 50 UG/ML
INJECTION, SOLUTION INTRAMUSCULAR; INTRAVENOUS
Status: DISCONTINUED | OUTPATIENT
Start: 2018-03-22 | End: 2018-03-22

## 2018-03-22 RX ORDER — OXYTOCIN 10 [USP'U]/ML
INJECTION, SOLUTION INTRAMUSCULAR; INTRAVENOUS
Status: DISCONTINUED
Start: 2018-03-22 | End: 2018-03-22 | Stop reason: WASHOUT

## 2018-03-22 RX ORDER — HYDROCODONE BITARTRATE AND ACETAMINOPHEN 5; 325 MG/1; MG/1
1 TABLET ORAL EVERY 4 HOURS PRN
Status: DISCONTINUED | OUTPATIENT
Start: 2018-03-22 | End: 2018-03-24 | Stop reason: HOSPADM

## 2018-03-22 RX ORDER — IBUPROFEN 600 MG/1
600 TABLET ORAL EVERY 6 HOURS
Status: DISCONTINUED | OUTPATIENT
Start: 2018-03-22 | End: 2018-03-24 | Stop reason: HOSPADM

## 2018-03-22 RX ORDER — DIPHENHYDRAMINE HYDROCHLORIDE 50 MG/ML
25 INJECTION INTRAMUSCULAR; INTRAVENOUS EVERY 4 HOURS PRN
Status: DISCONTINUED | OUTPATIENT
Start: 2018-03-22 | End: 2018-03-24 | Stop reason: HOSPADM

## 2018-03-22 RX ORDER — HYDROCORTISONE 25 MG/G
CREAM TOPICAL 3 TIMES DAILY PRN
Status: DISCONTINUED | OUTPATIENT
Start: 2018-03-22 | End: 2018-03-24 | Stop reason: HOSPADM

## 2018-03-22 RX ORDER — FENTANYL/BUPIVACAINE/NS/PF 2MCG/ML-.1
PLASTIC BAG, INJECTION (ML) INJECTION
Status: DISPENSED
Start: 2018-03-22 | End: 2018-03-22

## 2018-03-22 RX ORDER — DOCUSATE SODIUM 100 MG/1
200 CAPSULE, LIQUID FILLED ORAL 2 TIMES DAILY PRN
Status: DISCONTINUED | OUTPATIENT
Start: 2018-03-22 | End: 2018-03-24 | Stop reason: HOSPADM

## 2018-03-22 RX ORDER — HYDROCODONE BITARTRATE AND ACETAMINOPHEN 10; 325 MG/1; MG/1
1 TABLET ORAL EVERY 4 HOURS PRN
Status: DISCONTINUED | OUTPATIENT
Start: 2018-03-22 | End: 2018-03-24 | Stop reason: HOSPADM

## 2018-03-22 RX ADMIN — DEXTROSE 2.5 MILLION UNITS: 50 INJECTION, SOLUTION INTRAVENOUS at 06:03

## 2018-03-22 RX ADMIN — ACETAMINOPHEN 650 MG: 325 TABLET ORAL at 06:03

## 2018-03-22 RX ADMIN — IBUPROFEN 600 MG: 600 TABLET, FILM COATED ORAL at 09:03

## 2018-03-22 RX ADMIN — FENTANYL CITRATE 100 MCG: 50 INJECTION, SOLUTION INTRAMUSCULAR; INTRAVENOUS at 06:03

## 2018-03-22 RX ADMIN — HYDROCODONE BITARTRATE AND ACETAMINOPHEN 1 TABLET: 5; 325 TABLET ORAL at 03:03

## 2018-03-22 RX ADMIN — HYDROCODONE BITARTRATE AND ACETAMINOPHEN 1 TABLET: 5; 325 TABLET ORAL at 09:03

## 2018-03-22 RX ADMIN — BUPIVACAINE HYDROCHLORIDE 5 ML: 2.5 INJECTION, SOLUTION EPIDURAL; INFILTRATION; INTRACAUDAL; PERINEURAL at 03:03

## 2018-03-22 RX ADMIN — IBUPROFEN 600 MG: 600 TABLET, FILM COATED ORAL at 03:03

## 2018-03-22 RX ADMIN — DEXTROSE 2.5 MILLION UNITS: 50 INJECTION, SOLUTION INTRAVENOUS at 02:03

## 2018-03-22 NOTE — LACTATION NOTE
Seen pt lactation counseling.  Pt trying to nurse baby but baby was very sleepy.  Assessed baby and noted uncoordinated tongue movement during suckle-digital assessment.  Noted upper lip inserts in mid gum just between future central incisors (guide reference: Kotha's chart).  Maximum help provided, baby was not latching, taught pt hand expression and gave few drops of milk by cup.  To continue supplemental feeds after max attempt to breastfeed as ordered.  Reviewed benefits of feeding colostrum.   number on the board.      03/22/18 4273   Maternal Infant Assessment   Breast Shape pendulous   Breast Density soft   Areola elastic   Nipple(s) everted   Infant Assessment   Frenulum tight;other (see comments)  (labial)   Gum Symptoms other (see comments)  (labial frenulum center gums between future central incisors)   LATCH Score   Latch 0-->too sleepy or reluctant, no latch achieved       Number Scale   Presence of Pain denies   Maternal Infant Feeding   Maternal Emotional State assist needed   Time Spent (min) 0-15 min   Latch Assistance yes   Breastfeeding Education adequate infant intake;importance of skin-to-skin contact;increasing milk supply;adequate milk volume    Following Delivery yes   Feeding Infant   Feeding Tolerance/Success sleepy   Effective Latch During Feeding no   Lactation Referrals   Lactation Consult Breastfeeding assessment;Initial assessment   Lactation Interventions   Breastfeeding Assistance milk expression/pumping;assisted with positioning;support offered   Maternal Breastfeeding Support lactation counseling provided

## 2018-03-22 NOTE — L&D DELIVERY NOTE
[facility]  Vaginal Delivery   Operative Note    SUMMARY     Normal spontaneous vaginal delivery of live infant, skin to skin was unable to be performed due to 2/2 to infant being premature. Handed off to NICU for assesment.  Infant delivered position OA over intact perineum.  Nuchal cord: No.    Spontaneous delivery of placenta and IV pitocin given noting good uterine tone.  1 small vaginal laceration noted. Repaired with 2-0 chromic using a figure of 8 .  Patient tolerated delivery well. Sponge needle and lap counted correctly x2.    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    Thyroid nodule    HSV-2 infection complicating pregnancy    Prenatal care, first pregnancy    Sickle cell trait - partner negative    Premature rupture of membranes     (spontaneous vaginal delivery)     Admitting GA: 35w0d    Delivery Information for  Russell Sotomayor    Birth information:  YOB: 2018   Time of birth: 6:49 AM   Sex: male   Head Delivery Date/Time: 3/22/2018  6:49 AM   Delivery type: Vaginal, Spontaneous Delivery   Gestational Age: 35w0d    Delivery Providers    Delivering clinician:  Gabi Bernal MD   Provider Role    Anita Allred MD Resident    Tonia Colón, COREY Delivery Nurse    Varsha Tyler RN Delivery Assist    Freddy Wynn, COREY Charge Nurse             Measurements    Weight:  2326 g  Length:  44.5 cm  Head circumference:  30.5 cm  Chest circumference:  29.8 cm          Assessment    Living status:  Living  Apgars:     1 Minute:   5 Minute:   10 Minute:   15 Minute:   20 Minute:     Skin Color:   1  1       Heart Rate:   2  2       Reflex Irritability:   2   2       Muscle Tone:   2  2       Respiratory Effort:   2  2       Total:   9  9               Apgars Assigned By:  NICU         Assisted Delivery Details:    Forceps attempted?:  No  Vacuum extractor attempted?:  No         Shoulder Dystocia    Shoulder dystocia present?:  No           Presentation and Position    Presentation:  Vertex  Position:  Occiput Anterior           Interventions/Resuscitation    Method:  NICU Attended       Cord    Vessels:  3 vessels  Complications:  None  Delayed Cord Clamping?:  No  Cord Clamped Date/Time:  3/22/2018  6:49 AM  Cord Blood Disposition:  Sent with Baby  Gases Sent?:  No  Stem Cell Collection (by MD):  No       Placenta    Date and time:  3/22/2018  6:59 AM  Removal:  Spontaneous  Appearance:  Intact  Placenta disposition:  discarded           Labor Events:       labor: Yes     Labor Onset Date/Time:         Dilation Complete Date/Time:         Start Pushing Date/Time:       Rupture Date/Time:              Rupture type:           Fluid Amount:        Fluid Color:        Fluid Odor:        Membrane Status (PeriCalm): PPROM (Premature Prolonged Rupture)      Rupture Date/Time (PeriCalm): 2018 06:00:00      Fluid Amount (PeriCalm):        Fluid Color (PeriCalm):         steroids: Partial Course     Antibiotics given for GBS: Yes     Induction: misoprostol     Indications for induction:  Premature ROM;Prolonged ROM     Augmentation: oxytocin     Indications for augmentation: Ineffective Contraction Pattern     Labor complications: None     Additional complications:          Cervical ripening:          Misoprostol          Delivery:      Episiotomy: None     Indication for Episiotomy:       Perineal Lacerations: None Repaired:      Periurethral Laceration: none Repaired:     Labial Laceration: none Repaired:     Sulcus Laceration: none Repaired:     Vaginal Laceration: Yes Repaired: Yes   Cervical Laceration: No Repaired:     Repair suture: None     Repair  # of packets: 1     Vaginal delivery QBL (mL): 250      QBL (mL): 0     Combined Blood Loss (mL): 250     Vaginal Sweep Performed: Yes     Surgicount Correct: Yes       Other providers:       Anesthesia    Method:  Epidural          Details (if applicable):  Trial of Labor      Categorization:      Priority:     Indications for :     Incision Type:       Additional  information:  Forceps:    Vacuum:    Breech:    Observed anomalies    Other (Comments): NICU attended delivery for  gestational age.

## 2018-03-22 NOTE — PROGRESS NOTES
"LABOR NOTE    S:  Complaints: No.  Epidural working:  Yes -replaced and now comfortable. Patient had pitocin turned off for late decels.     O: BP (!) 113/56   Pulse 103   Temp 98.3 °F (36.8 °C) (Temporal)   Resp 18   Ht 5' 9" (1.753 m)   Wt 82.1 kg (181 lb)   LMP 2017   SpO2 100%   Breastfeeding? No   BMI 26.73 kg/m²       FHT: Cat 1 (reassuring), reactive. 130 BPM, mod BTBV, no decels, no accels.   CTX: q 2 minutes  SVE:       ASSESSMENT:   32 y.o.  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     INDUCTION:  - Cytotec at 1657 /-3  - Terbutaline given at 1718 for tetany with FHT deceleration  - Pitocin started at 2137, cervix 3/80/-2  - 2330 Cervix unchanged at 3/80/-2, forebag ruptured  - 0200 Cervix 3/90/-1. IUPC placed. Epidural replaced. Pi @ 3mU  - 0400 cervix /0. Pit off, will restart    PLAN:  Continue Close Maternal/Fetal Monitoring  Pitocin Augmentation per protocol  Recheck 2 hours or PRN    Jayy Webber MD  OB/GYN PGY-2  Pager: [phone]    "

## 2018-03-22 NOTE — PROGRESS NOTES
"LABOR NOTE    S:  Complaints: No.  Epidural working:  Yes -replaced and now comfortable    O: /60   Pulse 100   Temp 98.3 °F (36.8 °C) (Temporal)   Resp 18   Ht 5' 9" (1.753 m)   Wt 82.1 kg (181 lb)   LMP 2017   SpO2 100%   Breastfeeding? No   BMI 26.73 kg/m²       FHT: Cat 1 (reassuring), reactive  CTX: q 2 minutes  SVE: 3/90/-1      ASSESSMENT:   32 y.o.  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     INDUCTION:  - Cytotec at 1657 /-3  - Terbutaline given at 1718 for tetany with FHT deceleration  - Pitocin started at 2137, cervix 3/80/-2  - 2330 Cervix unchanged at 3/80/-2, forebag ruptured  - 0200 Cervix 3/90/-1. IUPC placed. Epidural replaced. Pi @ 3mU.    PLAN:  Continue Close Maternal/Fetal Monitoring  Pitocin Augmentation per protocol  Recheck 2 hours or PRN    Lisa Brown M.D.  PGY-3 ObGyn  [phone]  "

## 2018-03-22 NOTE — DISCHARGE INSTRUCTIONS
Breastfeeding Discharge Instructions       Feed the baby at the earliest sign of hunger or comfort  o Hands to mouth, sucking motions  o Rooting or searching for something to suck on  o Dont wait for crying - it is a late sign of hunger and comfort.     The feedings may be 8-12 times per 24hrs and will not follow a schedule   Avoid pacifiers and bottles for the first 4 weeks   Alternate the breast you start the feeding with, or start with the breast that feels the fullest   Switch breasts when the baby takes himself off the breast or falls asleep   Keep offering breasts until the baby looks full, no longer gives hunger signs, and stays asleep when placed on his back in the crib   If the baby is sleepy and wont wake for a feeding, put the baby skin-to-skin dressed in a diaper against the mothers bare chest   Sleep near your baby   The baby should be positioned and latched on to the breast correctly  o Chest-to-chest, chin in the breast  o Babys lips are flipped outward  o Babys mouth is stretched open wide like a shout  o Babys sucking should feel like tugging to the mother  - The baby should be drinking at the breast:  o You should hear swallowing or gulping throughout the feeding  o You should see milk on the babys lips when he comes off the breast  o Your breasts should be softer when the baby is finished feeding  o The baby should look relaxed at the end of feedings  o After the 4th day and your milk is in:  o The babys poop should turn bright yellow and be loose, watery, and seedy  o The baby should have at least 3-4 poops the size of the palm of your hand per day  o The baby should have at least 6-8 wet diapers per day  o The urine should be light yellow in color  You should drink when you are thirsty and eat a healthy diet when you are    hungry.     Take naps to get the rest you need.   Take medications and/or drink alcohol only with permission of your obstetrician    or the babys  pediatrician.  You can also call the [facility],   ([phone]), Monday-Friday, 8am-5pm Central time, to get the most   up-to-date evidence-based information on the use of medications during   pregnancy and breastfeeding.      The baby should be examined by a pediatrician at 3-5 days of age.  Once your   milk comes in, the baby should be gaining at least ½ - 1oz each day and should be back to birthweight no later than 10-14 days of age.

## 2018-03-22 NOTE — ANESTHESIA PROCEDURE NOTES
Epidural    Patient location during procedure: OB   Reason for block: primary anesthetic   Diagnosis: iup   Start time: 3/22/2018 12:45 AM  Timeout: 3/22/2018 12:42 AM  End time: 3/22/2018 1:00 AM  Staffing  Anesthesiologist: THERESE YANG  Resident/CRNA: JENAE RAMOS  Performed: anesthesiologist   Preanesthetic Checklist  Completed: patient identified, site marked, surgical consent, pre-op evaluation, timeout performed, IV checked, risks and benefits discussed, monitors and equipment checked, anesthesia consent given, hand hygiene performed and patient being monitored  Preparation  Patient position: sitting  Prep: ChloraPrep  Patient monitoring: Pulse Ox and Blood Pressure  Epidural  Skin Anesthetic: lidocaine 1%  Skin Wheal: 3 mL  Administration type: continuous  Approach: midline  Interspace: L2-3  Injection technique: ALEXA air  Needle and Epidural Catheter  Needle type: Tuohy   Needle gauge: 17  Needle length: 3.5 inches  Needle insertion depth: 5 cm  Catheter type: springwound and multi-orifice  Catheter size: 19 G  Catheter at skin depth: 9 cm  Test dose: 3 mL of lidocaine 1.5% with Epi 1-to-200,000  Additional Documentation: incremental injection, no paresthesia on injection, no significant pain on injection, no signs/symptoms of IV or SA injection and no significant complaints from patient  Needle localization: anatomical landmarks  Medications:  Bolus administered: 10 mL of 0.1% bupivacaine  Opioid administered: 20 mcg of   fentanyl  Volume per aspiration: 5 mL  Time between aspirations: 5 minutes  Assessment  Ease of block: easy  Patient's tolerance of the procedure: comfortable throughout block  Post dural Puncture Headache?: No  Additional Notes  Bolus obtained from epidural infusion bag.  Catheter replaced after unwitnessed disconnect.  Catheter, infusion bag and tubing replaced.

## 2018-03-22 NOTE — PROGRESS NOTES
"LABOR NOTE    S:  Complaints: yes, pain with ctx.  Epidural working:  Somewhat, awaiting anesthesia to recheck    O: BP (!) 108/53   Pulse (!) 117   Temp 98.3 °F (36.8 °C) (Temporal)   Resp 18   Ht 5' 9" (1.753 m)   Wt 82.1 kg (181 lb)   LMP 2017   SpO2 97%   Breastfeeding? No   BMI 26.73 kg/m²       FHT: Cat 1 (reassuring), reactive. 140 BPM, mod BTBV, + early decels, no accels.   CTX: q 2-5 minutes  SVE: c/c/+2, direct OA      ASSESSMENT:   32 y.o.  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     INDUCTION:  - Cytotec at 1657 1/70/-3  - Terbutaline given at 1718 for tetany with FHT deceleration  - Pitocin started at 2137, cervix 3/80/-2  - 2330 Cervix unchanged at 3/80/-2, forebag ruptured  - 0200 Cervix 3/90/-1. IUPC placed. Epidural replaced. Pi @ 3mU  - 0400 cervix 7/90/0. Pit off, will restart  - 0600 cervix c/c/+2    PLAN:  Continue Close Maternal/Fetal Monitoring  Pitocin Augmentation per protocol  Anticipate   NICU and staff notified  Move to OR for delivery    Cecelia Ogden MD  OBGYN - PGY 3  Pager: [phone]     "

## 2018-03-22 NOTE — PLAN OF CARE
Problem: Patient Care Overview  Goal: Plan of Care Review  Outcome: Ongoing (interventions implemented as appropriate)  To breastfeed baby 8x or more in 24 hours, feed on cue. To practice correct latch and positioning with breast compression during feeding and skin to skin during feeding.  To observe for signs of milk transfer during feeding.  To call for further breastfeeding assistance/ support if needed. To supplement as medically indicated/ordered.  To use hand express some colostrum and use breast pump for additional breast stimulation.

## 2018-03-22 NOTE — PROGRESS NOTES
LABOR PROGRESS NOTE    S: MD to bedside for cervical check    O: Temp:  [98.3 °F (36.8 °C)-99.6 °F (37.6 °C)] 98.3 °F (36.8 °C)  Pulse:  [] 104  Resp:  [16-18] 16  SpO2:  [95 %-100 %] 100 %  BP: (117-136)/(55-72) 128/69      FHT: 140 BPM/mod beat to beat variability/+ accels/intermittent variable decels.   SVE: 3/80/-2        ASSESSMENT:   32 y.o.  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         PLAN:  2115 - 3/80/-2 will start pitocin    Labor management  Continue Close Maternal/Fetal Monitoring.   S/p one dose cytotec  Recheck 4 hours or PRN      Jayy Webber MD

## 2018-03-22 NOTE — PROGRESS NOTES
"LABOR NOTE    S:  Complaints: no.  Epidural working:  not applicable      O: /69   Pulse 104   Temp 98.3 °F (36.8 °C) (Temporal)   Resp 16   Ht 5' 9" (1.753 m)   Wt 82.1 kg (181 lb)   LMP 2017   SpO2 100%   Breastfeeding? No   BMI 26.73 kg/m²       FHT: Cat 1 (reassuring), reactive  CTX: q 2 minutes  SVE: 3/80/-2  Forebag ruptured to reveal copious clear fluid    ASSESSMENT:   32 y.o.  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     INDUCTION  - Cytotec at 1657 1/70/-3  - Terbutaline given at 1718 for tetany with FHT deceleration  - Pitocin started at 2137, cervix 3/80/-2  - 2330 Cervix unchanged at 3/80/-2, forebag ruptured    PLAN:  Continue Close Maternal/Fetal Monitoring  Pitocin Augmentation per protocol. Currently on 2mU.  IUPC at next check if unchanged.  Continue PCN  Continue BMZ series  Recheck 2 hours or PRN    Lisa Brown M.D.  PGY-3 ObGyn  [phone]  "

## 2018-03-22 NOTE — LACTATION NOTE
Pump use and care discussed.  Reviewed feeding plan, to try to nurse baby, supplement as ordered with medical indication, use pump for additional breast stimulation.  Pt verbalized understanding.       03/22/18 4180   LATCH Score   Latch 0-->too sleepy or reluctant, no latch achieved   Equipment Type/Education   Pump Type Symphony   Breast Pump Type double electric, hospital grade   Breast Pump Flange Type hard   Breast Pump Flange Size 24 mm   Breast Pumping Bilateral Breasts:   Lactation Referrals   Lactation Consult Breastfeeding assessment;Pump teaching   Lactation Interventions   Breastfeeding Assistance electric breast pump used   Maternal Breastfeeding Support lactation counseling provided

## 2018-03-23 PROBLEM — O42.90 PREMATURE RUPTURE OF MEMBRANES: Status: RESOLVED | Noted: 2018-03-21 | Resolved: 2018-03-23

## 2018-03-23 LAB — BACTERIA SPEC AEROBE CULT: NORMAL

## 2018-03-23 PROCEDURE — 11000001 HC ACUTE MED/SURG PRIVATE ROOM

## 2018-03-23 PROCEDURE — 99024 POSTOP FOLLOW-UP VISIT: CPT | Mod: ,,, | Performed by: OBSTETRICS & GYNECOLOGY

## 2018-03-23 PROCEDURE — 25000003 PHARM REV CODE 250: Performed by: STUDENT IN AN ORGANIZED HEALTH CARE EDUCATION/TRAINING PROGRAM

## 2018-03-23 RX ORDER — IBUPROFEN 600 MG/1
600 TABLET ORAL EVERY 6 HOURS
Qty: 30 TABLET | Refills: 3 | Status: SHIPPED | OUTPATIENT
Start: 2018-03-24 | End: 2019-02-27 | Stop reason: SDUPTHER

## 2018-03-23 RX ADMIN — IBUPROFEN 600 MG: 600 TABLET, FILM COATED ORAL at 12:03

## 2018-03-23 RX ADMIN — IBUPROFEN 600 MG: 600 TABLET, FILM COATED ORAL at 06:03

## 2018-03-23 RX ADMIN — HYDROCODONE BITARTRATE AND ACETAMINOPHEN 1 TABLET: 5; 325 TABLET ORAL at 10:03

## 2018-03-23 NOTE — SUBJECTIVE & OBJECTIVE
Hospital course: 2018 patient admitted to [facility] / to PPROM. Patient PROM, clear fluid @ 0600 on 3/20/18. Neg SSE. Denies prodromal symptoms. Plan to IOL with Cytotec. Will start PCN for GBS unknown status, GBS collected in [facility], pending. Will give dose of rescue BMZ.   2018 - Pt delivered via .   2018 - PPD#1 pt doing well this morning. No complaints.    Interval History: PPD #1    She is doing well this morning. She is tolerating a regular diet without nausea or vomiting. She is voiding spontaneously. She is ambulating. She has passed flatus, and has not a BM. Vaginal bleeding is mild. She denies fever or chills. Abdominal pain is mild and controlled with oral medications. She is not breastfeeding. She desires circumcision for her male baby: yes.    Objective:     Vital Signs (Most Recent):  Temp: 97.9 °F (36.6 °C) (18)  Pulse: 91 (18)  Resp: 18 (18)  BP: (!) 105/56 (18)  SpO2: 98 % (18) Vital Signs (24h Range):  Temp:  [97.9 °F (36.6 °C)-98.6 °F (37 °C)] 97.9 °F (36.6 °C)  Pulse:  [] 91  Resp:  [16-18] 18  SpO2:  [86 %-100 %] 98 %  BP: (104-112)/(53-77) 105/56     Weight: 82.1 kg (181 lb)  Body mass index is 26.73 kg/m².      Intake/Output Summary (Last 24 hours) at 18 0658  Last data filed at 18 2350   Gross per 24 hour   Intake           1649.5 ml   Output             1755 ml   Net           -105.5 ml       Significant Labs:  Lab Results   Component Value Date    GROUPTRH A POS 2018    HEPBSAG Negative 2017    STREPBCULT Normal cervicovaginal juventino present 2018       Recent Labs  Lab 18  2101   HGB 9.0*   HCT 25.2*       I have personallly reviewed all pertinent lab results from the last 24 hours.    Physical Exam:   Constitutional: She is oriented to person, place, and time. She appears well-developed and well-nourished. No distress.    HENT:   Head: Atraumatic.    Eyes: EOM are normal. Pupils  are equal, round, and reactive to light.    Neck: Normal range of motion. Neck supple.    Cardiovascular: Normal rate and regular rhythm.     Pulmonary/Chest: Effort normal. No respiratory distress. She has no wheezes.        Abdominal: Soft. She exhibits no distension and no abdominal incision. There is no tenderness.             Musculoskeletal: Normal range of motion. She exhibits no edema or tenderness.       Neurological: She is alert and oriented to person, place, and time. She has normal reflexes.    Skin: Skin is warm and dry. She is not diaphoretic.

## 2018-03-23 NOTE — PROGRESS NOTES
Upon entering room, infant found in bed with mother coseeping. Infant placed in crib. Educated mother on dangers of cosleeping and that it is not recommended. Mother educated to place infant in open crib if tired. Mother verbalized understanding.

## 2018-03-23 NOTE — PROGRESS NOTES
[facility]  Obstetrics  Postpartum Progress Note    Patient Name: Gautam Sotomayor  MRN: [id number]  Admission Date: 3/21/2018  Hospital Length of Stay: 2 days  Attending Physician: Skylar Aviles MD  Primary Care Provider: Donna Akhtar MD    Subjective:     Principal Problem:Premature rupture of membranes    Hospital course: 2018 patient admitted to [facility]/ to PPROM. Patient PROM, clear fluid @ 0600 on 3/20/18. Neg SSE. Denies prodromal symptoms. Plan to IOL with Cytotec. Will start PCN for GBS unknown status, GBS collected in [facility], pending. Will give dose of rescue BMZ.   2018 - Pt delivered via .   2018 - PPD#1 pt doing well this morning. No complaints.    Interval History: PPD #1    She is doing well this morning. She is tolerating a regular diet without nausea or vomiting. She is voiding spontaneously. She is ambulating. She has passed flatus, and has not a BM. Vaginal bleeding is mild. She denies fever or chills. Abdominal pain is mild and controlled with oral medications. She is not breastfeeding. She desires circumcision for her male baby: yes.    Objective:     Vital Signs (Most Recent):  Temp: 97.9 °F (36.6 °C) (18)  Pulse: 91 (18)  Resp: 18 (18)  BP: (!) 105/56 (18)  SpO2: 98 % (18) Vital Signs (24h Range):  Temp:  [97.9 °F (36.6 °C)-98.6 °F (37 °C)] 97.9 °F (36.6 °C)  Pulse:  [] 91  Resp:  [16-18] 18  SpO2:  [86 %-100 %] 98 %  BP: (104-112)/(53-77) 105/56     Weight: 82.1 kg (181 lb)  Body mass index is 26.73 kg/m².      Intake/Output Summary (Last 24 hours) at 18 0658  Last data filed at 18 2350   Gross per 24 hour   Intake           1649.5 ml   Output             1755 ml   Net           -105.5 ml       Significant Labs:  Lab Results   Component Value Date    GROUPTRH A POS 2018    HEPBSAG Negative 2017    STREPBCULT Normal cervicovaginal juventino present 2018        Recent Labs  Lab 18  2101   HGB 9.0*   HCT 25.2*       I have personallly reviewed all pertinent lab results from the last 24 hours.    Physical Exam:   Constitutional: She is oriented to person, place, and time. She appears well-developed and well-nourished. No distress.    HENT:   Head: Atraumatic.    Eyes: EOM are normal. Pupils are equal, round, and reactive to light.    Neck: Normal range of motion. Neck supple.    Cardiovascular: Normal rate and regular rhythm.     Pulmonary/Chest: Effort normal. No respiratory distress. She has no wheezes.        Abdominal: Soft. She exhibits no distension and no abdominal incision. There is no tenderness.             Musculoskeletal: Normal range of motion. She exhibits no edema or tenderness.       Neurological: She is alert and oriented to person, place, and time. She has normal reflexes.    Skin: Skin is warm and dry. She is not diaphoretic.        Assessment/Plan:     32 y.o. female  for:     (spontaneous vaginal delivery)    1. Postpartum care:  - Patient doing well. Continue routine management and advances.  - Continue PO pain meds. Pain well controlled.  - Heme: H/h 10/29 > .  - Encourage ambulation  - Circumcision desired. Consents signed, order placed.  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    Valerio Higgins MD  Obstetrics  [facility]

## 2018-03-23 NOTE — ASSESSMENT & PLAN NOTE
1. Postpartum care:  - Patient doing well. Continue routine management and advances.  - Continue PO pain meds. Pain well controlled.  - Heme: H/h 10/29 > 9/25.  - Encourage ambulation  - Circumcision desired. Consents signed, order placed.  - Contraception defer PP  - Lactation PRN Lactation consult

## 2018-03-23 NOTE — PLAN OF CARE
"Problem: Patient Care Overview  Goal: Plan of Care Review  Outcome: Ongoing (interventions implemented as appropriate)  Lactation note:  To room to assist with breastfeeding. Infant has been "latching but not sucking" per patient. Offered assistance with latching at next feeding. Discussed importance of using a breastpump if infant not sucking to stimulate breasts. The patient has not pumped since yesterday. Encouraged nursing/pumpig 8 or more times in 24 hours.   phone number on board for patient to call when ready.      "

## 2018-03-23 NOTE — ANESTHESIA POSTPROCEDURE EVALUATION
"Anesthesia Post Evaluation    Patient: Gautam Sotomayor    Procedure(s) Performed: * No procedures listed *    Final Anesthesia Type: epidural  Patient location during evaluation: labor & delivery  Patient participation: Yes- Able to Participate  Level of consciousness: awake and alert and oriented  Post-procedure vital signs: reviewed and stable  Pain management: adequate  Airway patency: patent  PONV status at discharge: No PONV  Anesthetic complications: no      Cardiovascular status: blood pressure returned to baseline  Respiratory status: unassisted, room air and spontaneous ventilation  Hydration status: euvolemic  Follow-up not needed.        Visit Vitals  /64   Pulse 72   Temp 36.5 °C (97.7 °F) (Oral)   Resp 18   Ht 5' 9" (1.753 m)   Wt 82.1 kg (181 lb)   LMP 07/28/2017   SpO2 98%   Breastfeeding? Yes   BMI 26.73 kg/m²       Pain/Wellington Score: Pain Rating Prior to Med Admin: 2 (3/23/2018 12:14 PM)  Pain Rating Post Med Admin: 2 (3/22/2018 10:23 PM)      "

## 2018-03-23 NOTE — LACTATION NOTE
"   03/23/18 3118   Maternal Infant Assessment   Breast Shape Bilateral:;round   Breast Density Bilateral:;filling   Areola Bilateral:;elastic   Nipple(s) Bilateral:;everted   Nipple Symptoms bilateral:;tender   Infant Assessment   Frenulum tight   Sucking Reflex present   Rooting Reflex present   Swallow Reflex present   LATCH Score   Latch 1-->repeated attempts, holds nipple in mouth, stimulate to suck   Audible Swallowing 1-->a few with stimulation   Type Of Nipple 2-->everted (after stimulation)   Comfort (Breast/Nipple) 1-->filling, red/small blisters/bruises, mild/mod discomfort   Hold (Positioning) 0-->full assist (staff holds infant at breast)   Score (less than 7 for 2/more consecutive times, consult Lactation Consultant) 5       Number Scale   Presence of Pain complains of pain/discomfort   Location - Side Bilateral   Location nipple(s)   Pain Rating: Rest 1   Pain Frequency intermittent   Pain Quality soreness   Pain Management Interventions other (see comments)  (work on deep latch)   Maternal Infant Feeding   Infant Positioning clutch/"football";cross-cradle   Signs of Milk Transfer audible swallow;infant jaw motion present   Time Spent (min) 30-60 min   Latch Assistance yes   Engorgement Measures complete emptying encouraged;supportive bra encouraged   Breastfeeding Education adequate infant intake;adequate milk volume;importance of skin-to-skin contact;increasing milk supply;milk expression, electric pump;milk expression, hand   Infant First Feeding   Skin-to-Skin Contact Maintained   Feeding Infant   Feeding Readiness Cues rooting   Satiety Cues sleeping after feeding   Effective Latch During Feeding yes   Skin-to-Skin Contact During Feeding yes   Equipment Type/Education   Pumping Frequency (times) (encouraged breast pump use after nursing or if no latch)   Lactation Referrals   Lactation Consult Breastfeeding assessment;Follow up   Lactation Interventions   Attachment Promotion breastfeeding " assistance provided;counseling provided;skin-to-skin contact encouraged   Breastfeeding Assistance assisted with positioning;feeding cue recognition promoted;infant latch-on verified;infant stimulated to wakeful state;infant suck/swallow verified;milk expression/pumping   Maternal Breastfeeding Support encouragement offered

## 2018-03-24 VITALS
HEART RATE: 89 BPM | RESPIRATION RATE: 16 BRPM | DIASTOLIC BLOOD PRESSURE: 67 MMHG | SYSTOLIC BLOOD PRESSURE: 113 MMHG | HEIGHT: 69 IN | TEMPERATURE: 98 F | BODY MASS INDEX: 26.81 KG/M2 | WEIGHT: 181 LBS | OXYGEN SATURATION: 98 %

## 2018-03-24 PROCEDURE — 25000003 PHARM REV CODE 250: Performed by: STUDENT IN AN ORGANIZED HEALTH CARE EDUCATION/TRAINING PROGRAM

## 2018-03-24 RX ORDER — FOLIC ACID 1 MG/1
4 TABLET ORAL DAILY
Status: DISCONTINUED | OUTPATIENT
Start: 2018-03-24 | End: 2018-03-24 | Stop reason: HOSPADM

## 2018-03-24 RX ORDER — ONDANSETRON 8 MG/1
8 TABLET, ORALLY DISINTEGRATING ORAL EVERY 8 HOURS PRN
Status: DISCONTINUED | OUTPATIENT
Start: 2018-03-24 | End: 2018-03-24 | Stop reason: HOSPADM

## 2018-03-24 RX ADMIN — IBUPROFEN 600 MG: 600 TABLET, FILM COATED ORAL at 06:03

## 2018-03-24 RX ADMIN — HYDROCODONE BITARTRATE AND ACETAMINOPHEN 1 TABLET: 10; 325 TABLET ORAL at 12:03

## 2018-03-24 RX ADMIN — HYDROCODONE BITARTRATE AND ACETAMINOPHEN 1 TABLET: 5; 325 TABLET ORAL at 06:03

## 2018-03-24 RX ADMIN — IBUPROFEN 600 MG: 600 TABLET, FILM COATED ORAL at 12:03

## 2018-03-24 RX ADMIN — FOLIC ACID 4 MG: 1 TABLET ORAL at 08:03

## 2018-03-24 NOTE — PLAN OF CARE
Problem: Patient Care Overview  Goal: Plan of Care Review  Outcome: Ongoing (interventions implemented as appropriate)  Patient voiding and ambulating without difficulty. Pain well controlled with scheduled pain meds. Fundus firm, midline with light lochia. VSS. Significant other at bedside; parents responding to infant cues and bonding appropriately.

## 2018-03-24 NOTE — DISCHARGE SUMMARY
[facility]  Obstetrics  Discharge Summary      Patient Name: Gautam Sotomayor  MRN: [id number]  Admission Date: 3/21/2018  Hospital Length of Stay: 3 days  Discharge Date and Time:  2018 6:25 AM  Attending Physician: Skylar Aviles MD   Discharging Provider: Valerio Higgins MD  Primary Care Provider: Donna Akhtar MD    HPI: Gautam Sotomayor is a 32 y.o. [id number] at 34w6d presents complaining of leakage of fluid since 6AM on 3/20/18. Patient states she has been having clear fluid leak. Denies any contractions, vaginal bleeding. Confirms good fetal movement. This IUP is complicated by Sickle Cell Disease (type Sc), HSV2, and GBS unknown. Patient denies any current HSV outbreak or flu like symptoms.     * No surgery found *     Hospital Course:   2018 patient admitted to [facility]/ to PPROM. Patient PROM, clear fluid @ 0600 on 3/20/18. Neg SSE. Denies prodromal symptoms. Plan to IOL with Cytotec. Will start PCN for GBS unknown status, GBS collected in [facility], pending. Will give dose of rescue BMZ.   2018 - Pt delivered via .   2018 - PPD#1 pt doing well this morning. No complaints.  3/24/18. PPD#2. Doing well, stable for discharge. Meeting all pp milestones.     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 [D57.20] 2017 Yes      Problems Resolved During this Admission:    Diagnosis Date Noted Date Resolved POA    PRINCIPAL PROBLEM:  Premature rupture of membranes [O42.90] 2018  03/23/2018 Yes        Labs:   CBC   Recent Labs  Lab 03/22/18  2101   WBC 24.31*   HGB 9.0*   HCT 25.2*   *       Feeding Method: bottle    Immunizations     Date Immunization Status Dose Route/Site Given by    03/22/18 1348 MMR Incomplete 0.5 mL Subcutaneous/Left deltoid     03/22/18 1348 Tdap Incomplete 0.5 mL Intramuscular/Left deltoid           Delivery:    Episiotomy: None   Lacerations: None   Repair suture: None   Repair # of packets: 1   Blood loss (ml): 250     Birth information:  YOB: 2018   Time of birth: 6:49 AM   Sex: male   Delivery type: Vaginal, Spontaneous Delivery   Gestational Age: 35w0d    Delivery Clinician:      Other providers:       Additional  information:  Forceps:    Vacuum:    Breech:    Observed anomalies      Living?:           APGARS  One minute Five minutes Ten minutes   Skin color:         Heart rate:         Grimace:         Muscle tone:         Breathing:         Totals: 9  9        Placenta: Delivered:       appearance    Pending Diagnostic Studies:     None          Discharged Condition: good    Disposition: Home or Self Care    Follow Up:  Follow-up Information     Skylar Aviles MD. Schedule an appointment as soon as possible for a visit in 6 weeks.    Specialties:  Obstetrics, Obstetrics and Gynecology  Why:  post partum visit  Contact information:  [address] [id number] [phone]                 Patient Instructions:     Activity as tolerated     Other restrictions (specify):   Order Comments: Nothing in vagina for 6 weeks     Notify your health care provider if you experience any of the following:  temperature >100.4     Notify your health care provider if you experience any of the following:  persistent nausea and vomiting or diarrhea     Notify your health care provider if you experience any of the following:  severe uncontrolled pain     Notify your health care provider if you experience any of the following:   redness, tenderness, or signs of infection (pain, swelling, redness, odor or green/yellow discharge around incision site)     Notify your health care provider if you experience any of the following:  difficulty breathing or increased cough     Notify your health care provider if you experience any of the following:  severe persistent headache     Notify your health care provider if you experience any of the following:  worsening rash     Notify your health care provider if you experience any of the following:  persistent dizziness, light-headedness, or visual disturbances     Notify your health care provider if you experience any of the following:  increased confusion or weakness     Notify your health care provider if you experience any of the following:   Order Comments: Bleeding through 2 pads/hr for 2 hours       Medications:  Current Discharge Medication List      START taking these medications    Details   ibuprofen (ADVIL,MOTRIN) 600 MG tablet Take 1 tablet (600 mg total) by mouth every 6 (six) hours.  Qty: 30 tablet, Refills: 3         CONTINUE these medications which have NOT CHANGED    Details   aspirin 81 MG Chew Take 81 mg by mouth once daily.      cetirizine (ZYRTEC) 5 MG tablet Take 1 tablet (5 mg total) by mouth once daily.  Qty: 30 tablet, Refills: 2      folic acid (FOLVITE) 1 MG tablet Take 4 tablets (4 mg total) by mouth once daily.  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  Qty: 30 tablet, Refills: 1    Associated Diagnoses: Nausea and vomiting in pregnancy      PNV NO.95/FERROUS FUM/FOLIC AC (PRENATAL ORAL) Take by mouth.      prenatal 25-iron-folate 6-dha 30 mg iron-1mg -200 mg Cap Take 1 tablet by mouth once daily.  Qty: 30 capsule, Refills: 11    Associated Diagnoses: 23 weeks gestation of pregnancy       simethicone (GAS-X) 80 MG chewable tablet Take 1 tablet (80 mg total) by mouth every 6 (six) hours as needed for Flatulence.  Refills: 0         STOP taking these medications       valACYclovir (VALTREX) 1000 MG tablet Comments:   Reason for Stopping:               Valerio Higgins MD  Obstetrics [facility]

## 2018-03-24 NOTE — ASSESSMENT & PLAN NOTE
1. Postpartum care:  - Patient doing well. Continue routine management and advances.  - Continue PO pain meds. Pain well controlled.  - Heme: H/h 10/29 > 9/25.  - Encourage ambulation.  - Circumcision completed  - Contraception defer PP  - Lactation PRN Lactation consult

## 2018-03-24 NOTE — SUBJECTIVE & OBJECTIVE
Hospital course: 2018 patient admitted to [facility]/ to PPROM. Patient PROM, clear fluid @ 0600 on 3/20/18. Neg SSE. Denies prodromal symptoms. Plan to IOL with Cytotec. Will start PCN for GBS unknown status, GBS collected in [facility], pending. Will give dose of rescue BMZ.   2018 - Pt delivered via .   2018 - PPD#1 pt doing well this morning. No complaints.  3/24/18. PPD#2. Doing well, stable for discharge. Meeting all pp milestones.     Interval History: PPD #2    She is doing well this morning. She is tolerating a regular diet without nausea or vomiting. She is voiding spontaneously. She is ambulating. She has passed flatus, and has not a BM. Vaginal bleeding is mild. She denies fever or chills. Abdominal pain is mild and controlled with oral medications. She is not breastfeeding.     Objective:     Vital Signs (Most Recent):  Temp: 98 °F (36.7 °C) (18 0018)  Pulse: 87 (18 0018)  Resp: 18 (18 0018)  BP: (!) 118/56 (18 0018)  SpO2: 96 % (18 0018) Vital Signs (24h Range):  Temp:  [97.7 °F (36.5 °C)-98 °F (36.7 °C)] 98 °F (36.7 °C)  Pulse:  [72-87] 87  Resp:  [18] 18  SpO2:  [96 %-98 %] 96 %  BP: (107-118)/(56-65) 118/56     Weight: 82.1 kg (181 lb)  Body mass index is 26.73 kg/m².      Intake/Output Summary (Last 24 hours) at 18 0623  Last data filed at 18 1000   Gross per 24 hour   Intake                0 ml   Output              900 ml   Net             -900 ml       Significant Labs:  Lab Results   Component Value Date    GROUPTRH A POS 2018    HEPBSAG Negative 2017    STREPBCULT No Group B Streptococcus isolated 2018       Recent Labs  Lab 18  2101   HGB 9.0*   HCT 25.2*       I have personallly reviewed all pertinent lab results from the last 24 hours.    Physical Exam:   Constitutional: She is oriented to person, place, and time. She appears well-developed and well-nourished. No distress.    HENT:   Head: Atraumatic.    Eyes: EOM  are normal. Pupils are equal, round, and reactive to light.    Neck: Normal range of motion. Neck supple.    Cardiovascular: Normal rate and regular rhythm.     Pulmonary/Chest: Effort normal. No respiratory distress. She has no wheezes.        Abdominal: Soft. She exhibits no distension and no abdominal incision. There is no tenderness.             Musculoskeletal: Normal range of motion. She exhibits no edema or tenderness.       Neurological: She is alert and oriented to person, place, and time. She has normal reflexes.    Skin: Skin is warm and dry. She is not diaphoretic.

## 2018-03-24 NOTE — PROGRESS NOTES
[facility]  Obstetrics  Postpartum Progress Note    Patient Name: Gautam Sotomayor  MRN: [id number]  Admission Date: 3/21/2018  Hospital Length of Stay: 3 days  Attending Physician: Skylar Aviles MD  Primary Care Provider: Donna Akhtar MD    Subjective:     Principal Problem:Premature rupture of membranes    Hospital course: 2018 patient admitted to [facility] 2/2 to PPROM. Patient PROM, clear fluid @ 0600 on 3/20/18. Neg SSE. Denies prodromal symptoms. Plan to IOL with Cytotec. Will start PCN for GBS unknown status, GBS collected in [facility], pending. Will give dose of rescue BMZ.   2018 - Pt delivered via .   2018 - PPD#1 pt doing well this morning. No complaints.  3/24/18. PPD#2. Doing well, stable for discharge. Meeting all pp milestones.     Interval History: PPD #2    She is doing well this morning. She is tolerating a regular diet without nausea or vomiting. She is voiding spontaneously. She is ambulating. She has passed flatus, and has not a BM. Vaginal bleeding is mild. She denies fever or chills. Abdominal pain is mild and controlled with oral medications. She is not breastfeeding.     Objective:     Vital Signs (Most Recent):  Temp: 98 °F (36.7 °C) (18 0018)  Pulse: 87 (18 0018)  Resp: 18 (18 0018)  BP: (!) 118/56 (18 0018)  SpO2: 96 % (18 0018) Vital Signs (24h Range):  Temp:  [97.7 °F (36.5 °C)-98 °F (36.7 °C)] 98 °F (36.7 °C)  Pulse:  [72-87] 87  Resp:  [18] 18  SpO2:  [96 %-98 %] 96 %  BP: (107-118)/(56-65) 118/56     Weight: 82.1 kg (181 lb)  Body mass index is 26.73 kg/m².      Intake/Output Summary (Last 24 hours) at 18 06  Last data filed at 18 1000   Gross per 24 hour   Intake                0 ml   Output              900 ml   Net             -900 ml       Significant Labs:  Lab Results   Component Value Date    GROUPTRH A POS 2018    HEPBSAG Negative 2017    STREPBCULT No Group B Streptococcus  isolated 2018       Recent Labs  Lab 18  2101   HGB 9.0*   HCT 25.2*       I have personallly reviewed all pertinent lab results from the last 24 hours.    Physical Exam:   Constitutional: She is oriented to person, place, and time. She appears well-developed and well-nourished. No distress.    HENT:   Head: Atraumatic.    Eyes: EOM are normal. Pupils are equal, round, and reactive to light.    Neck: Normal range of motion. Neck supple.    Cardiovascular: Normal rate and regular rhythm.     Pulmonary/Chest: Effort normal. No respiratory distress. She has no wheezes.        Abdominal: Soft. She exhibits no distension and no abdominal incision. There is no tenderness.             Musculoskeletal: Normal range of motion. She exhibits no edema or tenderness.       Neurological: She is alert and oriented to person, place, and time. She has normal reflexes.    Skin: Skin is warm and dry. She is not diaphoretic.        Assessment/Plan:     32 y.o. female  for:     (spontaneous vaginal delivery)    1. Postpartum care:  - Patient doing well. Continue routine management and advances.  - Continue PO pain meds. Pain well controlled.  - Heme: H/h 10/29 > .  - Encourage ambulation.  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    Valerio Higgins MD  Obstetrics  [facility]

## 2018-03-27 ENCOUNTER — PATIENT MESSAGE (OUTPATIENT)
Dept: OBSTETRICS AND GYNECOLOGY | Facility: CLINIC | Age: 33
End: 2018-03-27

## 2018-03-29 ENCOUNTER — POSTPARTUM VISIT (OUTPATIENT)
Dept: OBSTETRICS AND GYNECOLOGY | Facility: CLINIC | Age: 33
End: 2018-03-29
Payer: COMMERCIAL

## 2018-03-29 VITALS — SYSTOLIC BLOOD PRESSURE: 130 MMHG | HEIGHT: 69 IN | DIASTOLIC BLOOD PRESSURE: 82 MMHG

## 2018-03-29 DIAGNOSIS — M54.9 BACK PAIN, UNSPECIFIED BACK LOCATION, UNSPECIFIED BACK PAIN LATERALITY, UNSPECIFIED CHRONICITY: ICD-10-CM

## 2018-03-29 DIAGNOSIS — O12.00 GESTATIONAL EDEMA, ANTEPARTUM: ICD-10-CM

## 2018-03-29 PROBLEM — B00.9 HSV-2 INFECTION COMPLICATING PREGNANCY: Status: RESOLVED | Noted: 2017-09-18 | Resolved: 2018-03-29

## 2018-03-29 PROBLEM — Z34.00 PRENATAL CARE, FIRST PREGNANCY: Status: RESOLVED | Noted: 2017-09-18 | Resolved: 2018-03-29

## 2018-03-29 PROBLEM — D57.3 SICKLE CELL TRAIT: Status: RESOLVED | Noted: 2017-10-16 | Resolved: 2018-03-29

## 2018-03-29 PROBLEM — O98.519 HSV-2 INFECTION COMPLICATING PREGNANCY: Status: RESOLVED | Noted: 2017-09-18 | Resolved: 2018-03-29

## 2018-03-29 PROCEDURE — 99999 PR PBB SHADOW E&M-EST. PATIENT-LVL II: CPT | Mod: PBBFAC,,, | Performed by: OBSTETRICS & GYNECOLOGY

## 2018-03-29 PROCEDURE — 0503F POSTPARTUM CARE VISIT: CPT | Mod: S$GLB,,, | Performed by: OBSTETRICS & GYNECOLOGY

## 2018-03-29 RX ORDER — OXYCODONE AND ACETAMINOPHEN 5; 325 MG/1; MG/1
1 TABLET ORAL EVERY 4 HOURS PRN
Qty: 10 TABLET | Refills: 0 | Status: SHIPPED | OUTPATIENT
Start: 2018-03-29 | End: 2018-11-12

## 2018-03-29 RX ORDER — BREAST PUMP
1 EACH MISCELLANEOUS DAILY PRN
Qty: 1 DEVICE | Refills: 0 | Status: SHIPPED | OUTPATIENT
Start: 2018-03-29 | End: 2018-11-12

## 2018-03-29 NOTE — PROGRESS NOTES
Chief Complaint   Patient presents with    Postpartum Care    Edema       HPI:  32 y.o. female  presents for a post-partum check-up    Patient's last menstrual period was 2017.    Delivery: , about 1 week ago  Hospitalization: UNCOMPLICATED  Ambulating, tolerating Po, moving bowels: YES  Pain controlled: PATIENT NOTES SOME OCCASIONAL EPISODES OF BACK PAIN UNCONTROLLED WITH IBUPROFEN.  Bleeding: TAPERING  Breastfeeding: YES  Breast pain or engorgement: DENIES    - PATIENT ALSO C/O SOME EDEMA IN HER FEET    Pap: 2017, ASCUS/HPV+    Past Medical History:   Diagnosis Date    Abnormal Pap smear of cervix , 2017    colpo    Allergic rhinitis     Anemia     Gallstone     Genital herpes 2012    Outbreaks 1-2 per year, last outbreak 2017    Sickle cell disease, type SC     Last crisis 3/2017     Past Surgical History:   Procedure Laterality Date    APPENDECTOMY      FOOT SURGERY Left     SPLENECTOMY, TOTAL      WISDOM TOOTH EXTRACTION         Social History   Substance Use Topics    Smoking status: Current Every Day Smoker     Packs/day: 0.25     Years: 10.00    Smokeless tobacco: Never Used    Alcohol use 0.6 oz/week     1 Cans of beer per week      Comment: socially     Family History   Problem Relation Age of Onset    Breast cancer Neg Hx     Colon cancer Neg Hx     Ovarian cancer Neg Hx      OB History    Para Term  AB Living   1 1 0 1 0 1   SAB TAB Ectopic Multiple Live Births   0 0 0 0 1      # Outcome Date GA Lbr Bro/2nd Weight Sex Delivery Anes PTL Lv   1  18 35w0d  2.326 kg (5 lb 2.1 oz) M Vag-Spont EPI Y NAZARIO          MEDICATIONS: Reviewed with patient.  ALLERGIES: Nickel     ROS:  Review of Systems   Constitutional: Negative for fever.   Respiratory: Negative for shortness of breath.    Cardiovascular: Negative for chest pain.   Gastrointestinal: Negative for abdominal pain, nausea and vomiting.   Genitourinary: Positive for pelvic pain and  "vaginal bleeding. Negative for dysuria.   Musculoskeletal: Positive for back pain.   Neurological: Negative for headaches.   Psychiatric/Behavioral: Negative for depression.   Breast: Negative for breast mass, breast pain, nipple discharge and skin changes      PHYSICAL EXAM:    /82   Ht 5' 9" (1.753 m)   LMP 07/28/2017     Physical Exam:   Constitutional: She is oriented to person, place, and time. She appears well-developed.    HENT:   Head: Normocephalic.       Pulmonary/Chest: Effort normal.        Abdominal: She exhibits no mass. There is no hepatomegaly. There is no tenderness.             Musculoskeletal:        Thoracic back: She exhibits no tenderness.        Lumbar back: She exhibits no tenderness.       Neurological: She is alert and oriented to person, place, and time.    Skin:   Bilateral, pitting lower extremity edema to ankles    Psychiatric: She has a normal mood and affect.         ASSESSMENT & PLAN:   Encounter for postpartum care of lactating mother  -     oxyCODONE-acetaminophen (PERCOCET) 5-325 mg per tablet; Take 1 tablet by mouth every 4 (four) hours as needed for Pain.  Dispense: 10 tablet; Refill: 0  -     breast pump Nayana; 1 Device by Misc.(Non-Drug; Combo Route) route daily as needed.  Dispense: 1 Device; Refill: 0    Back pain, unspecified back location, unspecified back pain laterality, unspecified chronicity    Gestational edema, antepartum        - Doing well  - Reassured patient that lower extremity edema was normal following delivery and hospitalization  - Counseled patient that edema would continue to improve as patient became more mobile    - No back tenderness on exam  - Counseled patient that pain was likely related to strain from delivery  - Rx for percocets given for pain uncontrolled with ibuprofen    - Rx for breast pump given    - RTC in about 4 weeks for PP check-up  "

## 2018-04-03 ENCOUNTER — PATIENT MESSAGE (OUTPATIENT)
Dept: INTERNAL MEDICINE | Facility: CLINIC | Age: 33
End: 2018-04-03

## 2018-04-04 ENCOUNTER — OFFICE VISIT (OUTPATIENT)
Dept: INTERNAL MEDICINE | Facility: CLINIC | Age: 33
End: 2018-04-04
Payer: COMMERCIAL

## 2018-04-04 VITALS
DIASTOLIC BLOOD PRESSURE: 84 MMHG | SYSTOLIC BLOOD PRESSURE: 120 MMHG | BODY MASS INDEX: 23.51 KG/M2 | HEIGHT: 69 IN | WEIGHT: 158.75 LBS | HEART RATE: 76 BPM

## 2018-04-04 DIAGNOSIS — D57.20 SICKLE CELL-HEMOGLOBIN C DISEASE WITHOUT CRISIS: ICD-10-CM

## 2018-04-04 DIAGNOSIS — R21 RASH OF HANDS: Primary | ICD-10-CM

## 2018-04-04 PROCEDURE — 99999 PR PBB SHADOW E&M-EST. PATIENT-LVL III: CPT | Mod: PBBFAC,,, | Performed by: INTERNAL MEDICINE

## 2018-04-04 PROCEDURE — 99214 OFFICE O/P EST MOD 30 MIN: CPT | Mod: S$GLB,,, | Performed by: INTERNAL MEDICINE

## 2018-04-04 RX ORDER — CETIRIZINE HYDROCHLORIDE 5 MG/1
5 TABLET ORAL DAILY
Qty: 30 TABLET | Refills: 1 | Status: SHIPPED | OUTPATIENT
Start: 2018-04-04 | End: 2018-07-01 | Stop reason: SDUPTHER

## 2018-04-04 RX ORDER — TRIAMCINOLONE ACETONIDE 1 MG/G
CREAM TOPICAL 2 TIMES DAILY
Qty: 454 G | Refills: 0 | Status: SHIPPED | OUTPATIENT
Start: 2018-04-04 | End: 2022-04-12 | Stop reason: SDUPTHER

## 2018-04-04 NOTE — PROGRESS NOTES
"Subjective:       Patient ID: Gautam Sotomayor is a 32 y.o. female.    Chief Complaint: Rash    HPI    Pt seen by Gynecology and MFM, also Hematology in past. PROM at 34 wks gestation 2 weeks ago. Has upcoming Gynecology appointment next week.    Starting on Sunday patient developed headache with sore throat.  Denied any neck stiffness.  Headache only on the right side of her head.  She took ibuprofen 600 mg but the headaches persisted until Tuesday.  At that point headache resolved, however started to develop soreness in the bilateral feet.  Noted some bumps developing on hands and fingers.  This morning the bumps worsen.  She reports significant pruritus throughout the hands and fingers, also some pruritus in the feet.  Denies any oral lesions.  Denies any fevers or chills.  She had a baby a week and a half ago, baby has been fine without symptoms.    Review of Systems    As per HPI    Objective:      Physical Exam   Constitutional: She is oriented to person, place, and time. No distress.   -American woman whose Body mass index is 23.44 kg/m².    HENT:   Mouth/Throat: Oropharynx is clear and moist. No oropharyngeal exudate.   Approx 3-4 faintly brown patches approx 1 cm in size along tongue, no other oropharynx abnormality.   Neurological: She is alert and oriented to person, place, and time.   Skin:   Papules and vesicles throughout palms and fingers of bilateral hands. Mild erythematous macules in bilateral soles of feet. (See image below).   Nursing note and vitals reviewed.      Vitals:    04/04/18 1438   BP: 120/84   BP Location: Right arm   Patient Position: Sitting   BP Method: Large (Manual)   Pulse: 76   Weight: 72 kg (158 lb 11.7 oz)   Height: 5' 9" (1.753 m)     Body mass index is 23.44 kg/m².    RESULTS: Reviewed labs from last 3 months    Assessment:       1. Rash of hands    2. 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    Diagnoses and " all orders for this visit:    Rash of hands:  New problem, symptoms are worsening. Unclear etiology, possibly viral exanthem, possibly herpangina.  Refer to dermatology for evaluation.  Kenalog for symptomatic relief.  -     triamcinolone acetonide 0.1% (KENALOG) 0.1 % cream; Apply topically 2 (two) times daily.  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 Check on labs today.  -     Ferritin; Future  -     Iron and TIBC; Future    Keep regular follow up appointments with Donna Akhtar MD.   Rigo Finley MD  Internal Medicine            Portions of this note were completed using [employer] dictation software. Please excuse typographical or syntax errors.

## 2018-04-10 ENCOUNTER — PATIENT MESSAGE (OUTPATIENT)
Dept: OBSTETRICS AND GYNECOLOGY | Facility: CLINIC | Age: 33
End: 2018-04-10

## 2018-05-14 ENCOUNTER — POSTPARTUM VISIT (OUTPATIENT)
Dept: OBSTETRICS AND GYNECOLOGY | Facility: CLINIC | Age: 33
End: 2018-05-14
Payer: COMMERCIAL

## 2018-05-14 VITALS
BODY MASS INDEX: 23.4 KG/M2 | HEIGHT: 69 IN | WEIGHT: 158 LBS | DIASTOLIC BLOOD PRESSURE: 80 MMHG | SYSTOLIC BLOOD PRESSURE: 120 MMHG

## 2018-05-14 PROCEDURE — 99999 PR PBB SHADOW E&M-EST. PATIENT-LVL III: CPT | Mod: PBBFAC,,, | Performed by: OBSTETRICS & GYNECOLOGY

## 2018-05-14 PROCEDURE — 0503F POSTPARTUM CARE VISIT: CPT | Mod: S$GLB,,, | Performed by: OBSTETRICS & GYNECOLOGY

## 2018-05-20 NOTE — PROGRESS NOTES
HISTORY OF PRESENT ILLNESS:    Gautam Sotomayor is a 33 y.o. female  Patient's last menstrual period was 2017. presents today for postpartum visit.   Patient desires to begin contraception, risks, benefits and options discussed in detail.   The use of the oral contraceptive, Depo Provera, Nexplanon, Nuva Ring and IUD has been fully discussed with the patient. Warnings about anticipated minor side effects such as breakthrough spotting, nausea, breast tenderness, weight changes, acne, headaches, etc. She has been told of the more serious potential side effects such as MI, stroke, and deep vein thrombosis, all of which are very unlikely. She has been asked to report any signs of such serious problems immediately.  The need for additional protection, such as a condom, during the first month of taking pills, while taking antibiotics and to prevent exposure to sexually transmitted diseases has also been discussed- the patient has been clearly reminded that contraception cannot protect her against diseases such as HIV and others. She understands and wishes to use condoms for now.     Past Medical History:   Diagnosis Date    Abnormal Pap smear of cervix ,     colpo    Allergic rhinitis     Anemia     Gallstone     Genital herpes 2012    Outbreaks 1-2 per year, last outbreak 2017    Sickle cell disease, type SC     Last crisis 3/2017       Past Surgical History:   Procedure Laterality Date    APPENDECTOMY      FOOT SURGERY Left     SPLENECTOMY, TOTAL      WISDOM TOOTH EXTRACTION         MEDICATIONS AND ALLERGIES:      Current Outpatient Prescriptions:     aspirin 81 MG Chew, Take 81 mg by mouth once daily., Disp: , Rfl:     breast pump Nayana, 1 Device by Misc.(Non-Drug; Combo Route) route daily as needed., Disp: 1 Device, Rfl: 0    cetirizine (ZYRTEC) 5 MG tablet, TAKE 1 TABLET (5 MG TOTAL) BY MOUTH ONCE DAILY., Disp: 30 tablet, Rfl: 1    folic acid (FOLVITE) 1 MG tablet, Take 4  tablets (4 mg total) by mouth once daily., Disp: 120 tablet, Rfl: 6    ibuprofen (ADVIL,MOTRIN) 600 MG tablet, Take 1 tablet (600 mg total) by mouth every 6 (six) hours., Disp: 30 tablet, Rfl: 3    ondansetron (ZOFRAN, AS HYDROCHLORIDE,) 4 MG tablet, Take 1 tablet (4 mg total) by mouth daily as needed for Nausea., Disp: 30 tablet, Rfl: 1    oxyCODONE-acetaminophen (PERCOCET) 5-325 mg per tablet, Take 1 tablet by mouth every 4 (four) hours as needed for Pain., Disp: 10 tablet, Rfl: 0    prenatal 25-iron-folate 6-dha 30 mg iron-1mg -200 mg Cap, Take 1 tablet by mouth once daily., Disp: 30 capsule, Rfl: 11    simethicone (GAS-X) 80 MG chewable tablet, Take 1 tablet (80 mg total) by mouth every 6 (six) hours as needed for Flatulence., Disp: , Rfl: 0    triamcinolone acetonide 0.1% (KENALOG) 0.1 % cream, Apply topically 2 (two) times daily., Disp: 454 g, Rfl: 0    Review of patient's allergies indicates:   Allergen Reactions    Nickel Rash       Family History   Problem Relation Age of Onset    Breast cancer Neg Hx     Colon cancer Neg Hx     Ovarian cancer Neg Hx        Social History     Social History    Marital status: Single     Spouse name: N/A    Number of children: N/A    Years of education: N/A     Occupational History    Not on file.     Social History Main Topics    Smoking status: Current Every Day Smoker     Packs/day: 0.25     Years: 10.00    Smokeless tobacco: Never Used    Alcohol use 0.6 oz/week     1 Cans of beer per week      Comment: socially    Drug use: No    Sexual activity: Yes     Partners: Male     Birth control/ protection: None, Condom     Other Topics Concern    Not on file     Social History Narrative    No narrative on file       COMPREHENSIVE GYN HISTORY:  PAP History: Denies abnormal Paps.  Infection History: Denies STDs. Denies PID.  Benign History: Denies uterine fibroids. Denies ovarian cysts. Denies endometriosis. Denies other conditions.  Cancer History: Denies  cervical cancer. Denies uterine cancer or hyperplasia. Denies ovarian cancer. Denies vulvar cancer or pre-cancer. Denies vaginal cancer or pre-cancer. Denies breast cancer. Denies colon cancer.  Sexual Activity History: Reports currently being sexually active  Menstrual History: Every 28 days, flows for 4 days. Light flow.  Dysmenorrhea History: Denies dysmenorrhea.      ROS:  GENERAL: No weight changes. No swelling. No fatigue. No fever.  CARDIOVASCULAR: No chest pain. No shortness of breath. No leg cramps.   NEUROLOGICAL: No headaches. No vision changes.  BREASTS: No pain. No lumps. No discharge.  ABDOMEN: No pain. No nausea. No vomiting. No diarrhea. No constipation.  REPRODUCTIVE: No abnormal bleeding.   VULVA: No pain. No lesions. No itching.  VAGINA: No relaxation. No itching. No odor. No discharge. No lesions.  URINARY: No incontinence. No nocturia. No frequency. No dysuria.    PE:  APPEARANCE: Well nourished, well developed, in no acute distress.  AFFECT: WNL, alert and oriented x 3.  ABDOMEN: Soft. No tenderness or masses. No hepatosplenomegaly. No hernias.   BREASTS: Symmetrical, no skin changes or visible lesions. No palpable masses, nipple discharge bilaterally.  PELVIC: Normal external female genitalia without lesions. Normal hair distribution. Adequate perineal body, normal urethral meatus. Vagina pink and well rugated without lesions or discharge. Cervix pink without lesions, discharge or tenderness. No significant cystocele or rectocele. Bimanual exam shows uterus to be 6-8 weeks size, regular, mobile and nontender. Adnexa without masses or tenderness.    DIAGNOSIS:  PP visit  Contraception       FOLLOW-UP with me in 3 months

## 2018-06-15 ENCOUNTER — PATIENT MESSAGE (OUTPATIENT)
Dept: OBSTETRICS AND GYNECOLOGY | Facility: CLINIC | Age: 33
End: 2018-06-15

## 2018-06-22 ENCOUNTER — PATIENT MESSAGE (OUTPATIENT)
Dept: OBSTETRICS AND GYNECOLOGY | Facility: CLINIC | Age: 33
End: 2018-06-22

## 2018-07-02 RX ORDER — CETIRIZINE HYDROCHLORIDE 5 MG/1
TABLET ORAL
Qty: 30 TABLET | Refills: 1 | Status: SHIPPED | OUTPATIENT
Start: 2018-07-02 | End: 2018-10-30 | Stop reason: ALTCHOICE

## 2018-07-06 ENCOUNTER — PATIENT MESSAGE (OUTPATIENT)
Dept: OPTOMETRY | Facility: CLINIC | Age: 33
End: 2018-07-06

## 2018-07-13 RX ORDER — FLUCONAZOLE 150 MG/1
150 TABLET ORAL DAILY
Qty: 1 TABLET | Refills: 0 | Status: SHIPPED | OUTPATIENT
Start: 2018-07-13 | End: 2018-07-14

## 2018-07-24 ENCOUNTER — OFFICE VISIT (OUTPATIENT)
Dept: OPTOMETRY | Facility: CLINIC | Age: 33
End: 2018-07-24

## 2018-07-24 ENCOUNTER — OFFICE VISIT (OUTPATIENT)
Dept: OPTOMETRY | Facility: CLINIC | Age: 33
End: 2018-07-24
Payer: COMMERCIAL

## 2018-07-24 DIAGNOSIS — H52.13 MYOPIA, BILATERAL: ICD-10-CM

## 2018-07-24 DIAGNOSIS — D57.1 SICKLE CELL RETINOPATHY WITHOUT CRISIS: Primary | ICD-10-CM

## 2018-07-24 DIAGNOSIS — Z46.0 ENCOUNTER FOR FITTING OR ADJUSTMENT OF SPECTACLES OR CONTACT LENSES: Primary | ICD-10-CM

## 2018-07-24 DIAGNOSIS — H36.89 SICKLE CELL RETINOPATHY WITHOUT CRISIS: Primary | ICD-10-CM

## 2018-07-24 DIAGNOSIS — H35.9 RETINA DISORDER, BILATERAL: ICD-10-CM

## 2018-07-24 PROCEDURE — 92310 CONTACT LENS FITTING OU: CPT | Mod: ,,, | Performed by: OPTOMETRIST

## 2018-07-24 PROCEDURE — 92014 COMPRE OPH EXAM EST PT 1/>: CPT | Mod: S$GLB,,, | Performed by: OPTOMETRIST

## 2018-07-24 PROCEDURE — 99999 PR PBB SHADOW E&M-EST. PATIENT-LVL III: CPT | Mod: PBBFAC,,, | Performed by: OPTOMETRIST

## 2018-07-24 PROCEDURE — 92015 DETERMINE REFRACTIVE STATE: CPT | Mod: S$GLB,,, | Performed by: OPTOMETRIST

## 2018-07-24 PROCEDURE — 99499 UNLISTED E&M SERVICE: CPT | Mod: ,,, | Performed by: OPTOMETRIST

## 2018-07-24 NOTE — PROGRESS NOTES
HPI     Contact Lens Follow Up    Additional comments: Patient in today for contact lens follow-up with   general eye examination.  Refer to additional patient encounter notes   dated 07/24/2018.             Comments   Patient in today for contact lens follow-up with general eye examination.    Refer to additional patient encounter notes dated 07/24/2018.          Last edited by Jaylon Crisostomo, OD on 7/24/2018  2:37 PM. (History)            Assessment /Plan     For exam results, see Encounter Report.    1. Encounter for fitting or adjustment of spectacles or contact lenses                       History of sickle cell hemoglobin C disease.  Bilateral periheral retinal changes/scarring.       Bilateral fundus photos taken previously.     Myopia in each eye, with good best-correctable VA in each eye.  Stable refraction in each eye.   Spectacle lens Rx issued for use in lieu of CLs.     Wearing Acuve Oasys soft contact leneses.  Good contact lens lens fit in each eye.  Wearing CLs well in both eyes.   Power of each CL fine as is.   New (duplicate) CL Rx issued.      Repeat general eye examination and refraction, and contact lens follow-up, in one year.

## 2018-07-24 NOTE — PROGRESS NOTES
HPI     Concerns About Ocular Health    Additional comments: Eye exam and refraction, and contact lens follow-up.              Comments   Patient's age: 33 y.o. AA female  Occupation: Nurse [employer]   Approximate date of last eye examination: 04/06/2017 and 05/02/2017  Name of last eye doctor seen: Dr. Crisostomo   City/State: [place]   Wears glasses? No - states she has no glasses.    Any problem with VA with glasses?  Pt would like a rx for eye glasses   Wears CLs?:  Yes            If yes:              Type of CL worn:  SCL's                       Last CL Rx:  Acuvue Oasys OD  8.4   14.0    -3.00                                                                  OS  8.4     14.0    -3.00              Wears full-time or part-time:  Full time                Sleeps with contact lenses:  No                                How often replace CLs:  2-3 weeks               Any problem with VA with CLs?  Patient feels her overall   vision has changed         Headaches?  No  Eye pain/discomfort?  No                                                                                     Flashes?  No  Floaters?  No  Diplopia/Double vision?  No  Patient's Ocular History:         Any eye surgeries? No         Any eye injury?  No         Any treatment for eye disease?  ? Sickle cell - like retinal   changes - previously followed by Dr. Ramachandran at [facility] in [place], [place]  -   s/p spleenectomy.    Family history of eye disease?  No  Significant patient medical history:         1. Diabetes?  No       If yes, IDDM or NIDDM? n/a   2. HBP?  No              3. 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 OTC eyedrops currently using: Visine    ! Prescription eye meds currently using:  No   ! Any history of allergy/adverse reaction to any eye meds used   previously?  No    ! Any history of allergy/adverse reaction to eyedrops used during prior   eye exam(s)? No    ! Any history of allergy/adverse reaction to Novacaine or similar  meds?   No   ! Any history of allergy/adverse reaction to Epinephrine or similar meds?   No    ! Patient okay with use of anesthetic eyedrops to check eye pressure?    yes        ! Patient okay with use of eyedrops to dilate pupils today?  yes   !  Allergies/Medications/Medical History/Family History reviewed today?    Yes       PD =   65/61                                                      Last edited by Jaylon Crisostomo, OD on 7/24/2018  3:01 PM. (History)            Assessment /Plan     For exam results, see Encounter Report.    1. 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 Retina disorder, bilateral     3. Myopia, bilateral                   History of sickle cell hemoglobin C disease.  Bilateral periheral retinal changes/scarring.       Bilateral fundus photos taken previously.     Myopia in each eye, with good best-correctable VA in each eye.  Stable refraction in each eye.   Spectacle lens Rx issued for use in lieu of CLs.     Wearing Acuve Oasys soft contact leneses.  Good contact lens lens fit in each eye.  Wearing CLs well in both eyes.   Power of each CL fine as is.   New (duplicate) CL Rx issued.      Repeat general eye examination and refraction, and contact lens follow-up, in one year.

## 2018-07-24 NOTE — PATIENT INSTRUCTIONS
History of sickle cell hemoglobin C disease.  Bilateral periheral retinal changes/scarring.       Bilateral fundus photos taken previously.     Myopia in each eye, with good best-correctable VA in each eye.  Stable refraction in each eye.   Spectacle lens Rx issued for use in lieu of CLs.     Wearing Acuve Oasys soft contact leneses.  Good contact lens lens fit in each eye.  Wearing CLs well in both eyes.   Power of each CL fine as is.   New (duplicate) CL Rx issued.      Repeat general eye examination and refraction, and contact lens follow-up, in one year.

## 2018-07-26 ENCOUNTER — PATIENT MESSAGE (OUTPATIENT)
Dept: OBSTETRICS AND GYNECOLOGY | Facility: CLINIC | Age: 33
End: 2018-07-26

## 2018-08-03 ENCOUNTER — TELEPHONE (OUTPATIENT)
Dept: OBSTETRICS AND GYNECOLOGY | Facility: CLINIC | Age: 33
End: 2018-08-03

## 2018-08-03 NOTE — TELEPHONE ENCOUNTER
----- Message from Natividad Bautista sent at 8/3/2018 10:19 AM CDT -----  Contact: Shyanne/Ha Kimble states she is needing the updated form sent in for this pt Nexplanon.

## 2018-08-10 DIAGNOSIS — D57.20 SICKLE CELL-HEMOGLOBIN C DISEASE WITHOUT CRISIS: ICD-10-CM

## 2018-08-10 RX ORDER — FOLIC ACID 1 MG/1
4 TABLET ORAL DAILY
Qty: 120 TABLET | Refills: 4 | Status: SHIPPED | OUTPATIENT
Start: 2018-08-10 | End: 2022-04-12

## 2018-08-14 ENCOUNTER — PATIENT MESSAGE (OUTPATIENT)
Dept: OBSTETRICS AND GYNECOLOGY | Facility: CLINIC | Age: 33
End: 2018-08-14

## 2018-08-22 ENCOUNTER — TELEPHONE (OUTPATIENT)
Dept: OBSTETRICS AND GYNECOLOGY | Facility: CLINIC | Age: 33
End: 2018-08-22

## 2018-08-22 NOTE — TELEPHONE ENCOUNTER
I left a message for the pt to call the office back to inform her that she is covered for buy and bill of the nexplanon .

## 2018-09-13 ENCOUNTER — TELEPHONE (OUTPATIENT)
Dept: OBSTETRICS AND GYNECOLOGY | Facility: CLINIC | Age: 33
End: 2018-09-13

## 2018-09-13 NOTE — TELEPHONE ENCOUNTER
I left a full detail message for the pt on her voice mail that we will need to jess her apt for tomorrow. Dr Aviles does not place Nexplanon . She will have to be placed on someone else's schedule for that procedure. I also sent a message to the lead PSA that scheduled the apt to inform her that we do not do Nexplanon  placement.

## 2018-09-14 ENCOUNTER — TELEPHONE (OUTPATIENT)
Dept: OBSTETRICS AND GYNECOLOGY | Facility: CLINIC | Age: 33
End: 2018-09-14

## 2018-09-14 NOTE — TELEPHONE ENCOUNTER
I left a second message for the pt that we do not place  nexplanon and I need to jess her apt for another provider that does do the insertion of the device.

## 2018-10-30 ENCOUNTER — OFFICE VISIT (OUTPATIENT)
Dept: PRIMARY CARE CLINIC | Facility: CLINIC | Age: 33
End: 2018-10-30
Attending: FAMILY MEDICINE
Payer: COMMERCIAL

## 2018-10-30 ENCOUNTER — TELEPHONE (OUTPATIENT)
Dept: PRIMARY CARE CLINIC | Facility: CLINIC | Age: 33
End: 2018-10-30

## 2018-10-30 ENCOUNTER — LAB VISIT (OUTPATIENT)
Dept: LAB | Facility: HOSPITAL | Age: 33
End: 2018-10-30

## 2018-10-30 VITALS
TEMPERATURE: 99 F | HEIGHT: 69 IN | BODY MASS INDEX: 23.77 KG/M2 | HEART RATE: 102 BPM | WEIGHT: 160.5 LBS | SYSTOLIC BLOOD PRESSURE: 119 MMHG | DIASTOLIC BLOOD PRESSURE: 68 MMHG | OXYGEN SATURATION: 98 %

## 2018-10-30 DIAGNOSIS — J30.9 ALLERGIC RHINITIS, UNSPECIFIED SEASONALITY, UNSPECIFIED TRIGGER: ICD-10-CM

## 2018-10-30 DIAGNOSIS — Z11.3 SCREEN FOR STD (SEXUALLY TRANSMITTED DISEASE): ICD-10-CM

## 2018-10-30 DIAGNOSIS — Z00.00 ENCOUNTER FOR WELLNESS EXAMINATION IN ADULT: Primary | ICD-10-CM

## 2018-10-30 DIAGNOSIS — R05.9 COUGH: ICD-10-CM

## 2018-10-30 DIAGNOSIS — N89.8 VAGINAL DISCHARGE: ICD-10-CM

## 2018-10-30 DIAGNOSIS — J06.9 UPPER RESPIRATORY TRACT INFECTION, UNSPECIFIED TYPE: ICD-10-CM

## 2018-10-30 DIAGNOSIS — Z00.00 ENCOUNTER FOR WELLNESS EXAMINATION IN ADULT: ICD-10-CM

## 2018-10-30 DIAGNOSIS — Z30.09 BIRTH CONTROL COUNSELING: ICD-10-CM

## 2018-10-30 DIAGNOSIS — R09.81 NASAL CONGESTION: ICD-10-CM

## 2018-10-30 LAB
25(OH)D3+25(OH)D2 SERPL-MCNC: 12 NG/ML
ALBUMIN SERPL BCP-MCNC: 4.4 G/DL
ALP SERPL-CCNC: 64 U/L
ALT SERPL W/O P-5'-P-CCNC: 12 U/L
ANION GAP SERPL CALC-SCNC: 7 MMOL/L
AST SERPL-CCNC: 17 U/L
BASOPHILS # BLD AUTO: 0.08 K/UL
BASOPHILS NFR BLD: 0.8 %
BILIRUB SERPL-MCNC: 0.8 MG/DL
BUN SERPL-MCNC: 8 MG/DL
CALCIUM SERPL-MCNC: 9.5 MG/DL
CHLORIDE SERPL-SCNC: 108 MMOL/L
CHOLEST SERPL-MCNC: 178 MG/DL
CHOLEST/HDLC SERPL: 5.1 {RATIO}
CO2 SERPL-SCNC: 24 MMOL/L
CREAT SERPL-MCNC: 0.8 MG/DL
DIFFERENTIAL METHOD: ABNORMAL
EOSINOPHIL # BLD AUTO: 0.4 K/UL
EOSINOPHIL NFR BLD: 4 %
ERYTHROCYTE [DISTWIDTH] IN BLOOD BY AUTOMATED COUNT: 14.1 %
EST. GFR  (AFRICAN AMERICAN): >60 ML/MIN/1.73 M^2
EST. GFR  (NON AFRICAN AMERICAN): >60 ML/MIN/1.73 M^2
GLUCOSE SERPL-MCNC: 85 MG/DL
HCT VFR BLD AUTO: 32.4 %
HDLC SERPL-MCNC: 35 MG/DL
HDLC SERPL: 19.7 %
HGB BLD-MCNC: 11.5 G/DL
IMM GRANULOCYTES # BLD AUTO: 0.02 K/UL
IMM GRANULOCYTES NFR BLD AUTO: 0.2 %
LDLC SERPL CALC-MCNC: 113 MG/DL
LYMPHOCYTES # BLD AUTO: 3.3 K/UL
LYMPHOCYTES NFR BLD: 32.5 %
MCH RBC QN AUTO: 29.9 PG
MCHC RBC AUTO-ENTMCNC: 35.5 G/DL
MCV RBC AUTO: 84 FL
MONOCYTES # BLD AUTO: 0.8 K/UL
MONOCYTES NFR BLD: 8 %
NEUTROPHILS # BLD AUTO: 5.6 K/UL
NEUTROPHILS NFR BLD: 54.5 %
NONHDLC SERPL-MCNC: 143 MG/DL
NRBC BLD-RTO: 1 /100 WBC
PLATELET # BLD AUTO: 287 K/UL
PMV BLD AUTO: 12.3 FL
POTASSIUM SERPL-SCNC: 4 MMOL/L
PROT SERPL-MCNC: 7.7 G/DL
RBC # BLD AUTO: 3.85 M/UL
SODIUM SERPL-SCNC: 139 MMOL/L
TRIGL SERPL-MCNC: 150 MG/DL
TSH SERPL DL<=0.005 MIU/L-ACNC: 0.65 UIU/ML
WBC # BLD AUTO: 10.21 K/UL

## 2018-10-30 PROCEDURE — 80053 COMPREHEN METABOLIC PANEL: CPT

## 2018-10-30 PROCEDURE — 85025 COMPLETE CBC W/AUTO DIFF WBC: CPT

## 2018-10-30 PROCEDURE — 99999 PR PBB SHADOW E&M-EST. PATIENT-LVL III: CPT | Mod: PBBFAC,,, | Performed by: NURSE PRACTITIONER

## 2018-10-30 PROCEDURE — 99213 OFFICE O/P EST LOW 20 MIN: CPT | Mod: PBBFAC,PN | Performed by: NURSE PRACTITIONER

## 2018-10-30 PROCEDURE — 86703 HIV-1/HIV-2 1 RESULT ANTBDY: CPT

## 2018-10-30 PROCEDURE — 36415 COLL VENOUS BLD VENIPUNCTURE: CPT | Mod: PN

## 2018-10-30 PROCEDURE — 99395 PREV VISIT EST AGE 18-39: CPT | Mod: S$GLB,,, | Performed by: NURSE PRACTITIONER

## 2018-10-30 PROCEDURE — 82306 VITAMIN D 25 HYDROXY: CPT

## 2018-10-30 PROCEDURE — 80061 LIPID PANEL: CPT

## 2018-10-30 PROCEDURE — 84443 ASSAY THYROID STIM HORMONE: CPT

## 2018-10-30 PROCEDURE — 86592 SYPHILIS TEST NON-TREP QUAL: CPT

## 2018-10-30 RX ORDER — PROMETHAZINE HYDROCHLORIDE AND DEXTROMETHORPHAN HYDROBROMIDE 6.25; 15 MG/5ML; MG/5ML
5 SYRUP ORAL EVERY 6 HOURS PRN
Qty: 118 ML | Refills: 0 | Status: SHIPPED | OUTPATIENT
Start: 2018-10-30 | End: 2018-11-09

## 2018-10-30 RX ORDER — CETIRIZINE HYDROCHLORIDE 10 MG/1
10 TABLET ORAL DAILY
Qty: 30 TABLET | Refills: 4 | Status: SHIPPED | OUTPATIENT
Start: 2018-10-30 | End: 2019-03-25 | Stop reason: SDUPTHER

## 2018-10-30 RX ORDER — FLUTICASONE PROPIONATE 50 MCG
1 SPRAY, SUSPENSION (ML) NASAL DAILY
Qty: 1 BOTTLE | Refills: 3 | Status: SHIPPED | OUTPATIENT
Start: 2018-10-30 | End: 2019-03-25 | Stop reason: SDUPTHER

## 2018-10-30 RX ORDER — FLUCONAZOLE 150 MG/1
150 TABLET ORAL DAILY
Qty: 1 TABLET | Refills: 0 | Status: SHIPPED | OUTPATIENT
Start: 2018-10-30 | End: 2018-10-31

## 2018-10-30 NOTE — PATIENT INSTRUCTIONS
Your exam was overall normal today.  Your blood pressure was good.  I will order routine fasting labs today - at least 6-8 hours of fasting.  We discussed the Flu and Tdap vaccinations.  Return in 1 year - sooner if needed.  Please come at least 15-20 minutes before your scheduled appointment time.    Refilled flonase and zyrtec   Will send in cough syrup. Take at bedtime, can cause sedation.    1)Distilled salt water sinus rinses via neti pots or products such as Mohinder Med Sinus Rinse or Sinugator. Must wash container or device and use bottled water or distilled water. to avoid introducing infection.  2)Nasal Steroids (Nasocort, Rhinocort, Flonase)  3)Antihistamines(Allegra, Claritin, Xzyal, Zyrtec)    You can can use (as directed) any combination of these three things every day of your life if needed in order to treat or control your symptoms. Brand name use of medications is not necessary

## 2018-10-30 NOTE — PROGRESS NOTES
Chief Complaint: Cough (3+w); Nasal Congestion (3+w); and Annual Exam      HPI     Gautam Sotomayor is a 33 y.o. female with sickle cell disease and post splenectomy who presents for an urgent visit today. She is an established patient of Dr Akhtar but new to me. Patient requests annual visit today. Last annual was over 2 years ago. Delivered healthy baby boy on 3/22/18.    She c/o dry cough and nasal congestion for x 3 weeks. Also c/o ear fullness. Did have mild sore throat but that has resolved. Denies fever/chills/night sweats/body aches. +mild fatigue.     C/o vaginal discharge x 3 days. Denies pruritis or foul odor.    Sickle cell anemia-iron studies and CBC done in 4/2018 were stable. Followed by Hematologist annually (last seen 1/19/18). Also see Ophthalmology (7/24/18) annually as well (sickle cell retinopathy)     Requests referral to GYN for birth control options (wants implant). Has new insurance so thinks she may need a referral.    Health maint:  Pap smear 8/10/17  Tetanus 2/15/18  Pneumococcal 10/11/16  Flu shot, got this past Friday 10/26/18    Past Medical History:  Past Medical History:   Diagnosis Date    Abnormal Pap smear of cervix 2016, 2017    colpo    Allergic rhinitis     Anemia     Gallstone     Genital herpes 2012    Outbreaks 1-2 per year, last outbreak 8/2017    Sickle cell disease, type SC     Last crisis 3/2017       Review of Systems:  Review of Systems   Constitutional: Negative for activity change, appetite change, chills, diaphoresis, fatigue and fever.   HENT: Positive for congestion, ear pain (fullness), rhinorrhea and sore throat (resolving). Negative for sinus pressure.    Eyes: Negative for visual disturbance.   Respiratory: Positive for cough. Negative for chest tightness, shortness of breath and wheezing.    Cardiovascular: Negative for chest pain, palpitations and leg swelling.   Gastrointestinal: Negative for abdominal pain, blood in stool, constipation, diarrhea,  "nausea and vomiting.   Endocrine: Negative for polydipsia, polyphagia and polyuria.   Genitourinary: Positive for vaginal discharge. Negative for difficulty urinating, dysuria, flank pain and frequency.   Musculoskeletal: Negative for arthralgias, back pain, joint swelling and myalgias.   Skin: Negative for rash.   Neurological: Negative for dizziness, syncope, weakness, light-headedness and headaches.   Psychiatric/Behavioral: Negative for dysphoric mood and sleep disturbance. The patient is not nervous/anxious.          PHYSICAL EXAM     Vitals:    10/30/18 1223   BP: 119/68   Pulse: 102   Temp: 98.6 °F (37 °C)   SpO2: 98%   Weight: 72.8 kg (160 lb 7.9 oz)   Height: 5' 9" (1.753 m)       Physical Exam   Constitutional: She is oriented to person, place, and time. She appears well-developed and well-nourished. No distress.   HENT:   Head: Normocephalic and atraumatic.   Right Ear: Hearing, tympanic membrane, external ear and ear canal normal.   Left Ear: Hearing, tympanic membrane, external ear and ear canal normal.   Nose: Nose normal.   Mouth/Throat: Oropharynx is clear and moist. No oropharyngeal exudate.   Eyes: Conjunctivae and EOM are normal. Pupils are equal, round, and reactive to light.   Neck: Normal range of motion. Neck supple. No tracheal deviation present. No thyromegaly present.   Cardiovascular: Normal rate, regular rhythm, normal heart sounds and intact distal pulses. Exam reveals no gallop and no friction rub.   No murmur heard.  Pulmonary/Chest: Effort normal and breath sounds normal. No stridor. No respiratory distress. She has no wheezes. She has no rales. She exhibits no tenderness.   Abdominal: Soft. Bowel sounds are normal. She exhibits no distension and no mass. There is no tenderness. There is no guarding. No hernia.   Musculoskeletal: Normal range of motion. She exhibits no edema.   Lymphadenopathy:     She has no cervical adenopathy.   Neurological: She is alert and oriented to person, " place, and time. No cranial nerve deficit or sensory deficit.   Skin: Skin is warm and dry. She is not diaphoretic. No erythema.   Psychiatric: She has a normal mood and affect. Her behavior is normal. Judgment and thought content normal.   Vitals reviewed.      Assessment:       1. Encounter for wellness examination in adult    2. Screen for STD (sexually transmitted disease)    3. Birth control counseling    4. Allergic rhinitis, unspecified seasonality, unspecified trigger    5. Vaginal discharge    6. Cough    7. Nasal congestion    8. Upper respiratory tract infection, unspecified type        Plan:       Gautam was seen today for cough, nasal congestion and annual exam.    Diagnoses and all orders for this visit:    Encounter for wellness examination in adult-age and gender health maint reviewed and UTD. Will do routine fasting labs today.  -     CBC auto differential; Future  -     Comprehensive metabolic panel; Future  -     Lipid panel; Future  -     Vitamin D; Future  -     TSH; Future    Screen for STD (sexually transmitted disease)  -     HIV-1 and HIV-2 antibodies; Future  -     RPR; Future  -     C. trachomatis/N. gonorrhoeae by AMP DNA; Future    Birth control counseling  -     Ambulatory Referral to Obstetrics / Gynecology    Allergic rhinitis, unspecified seasonality, unspecified trigger-uncontrolled. Sh has been out of flonase and zyrtec. Will refill both today. Also advised nasal saline rinses daily followed by Flonase.  -     fluticasone (FLONASE) 50 mcg/actuation nasal spray; 1 spray (50 mcg total) by Each Nare route once daily.  -     cetirizine (ZYRTEC) 10 MG tablet; Take 1 tablet (10 mg total) by mouth once daily.    Vaginal discharge-likely yeast. will do one dose of fluconazole. If no improvement in symptoms, will need to see GYN.  -     fluconazole (DIFLUCAN) 150 MG Tab; Take 1 tablet (150 mg total) by mouth once daily. for 1 day    Cough-likely viral and/or related to allergies. Lungs  clear on exam. Declines chest xray-low suspicion for pneumonia.  -     promethazine-dextromethorphan (PROMETHAZINE-DM) 6.25-15 mg/5 mL Syrp; Take 5 mLs by mouth every 6 (six) hours as needed.    Nasal congestion  -     fluticasone (FLONASE) 50 mcg/actuation nasal spray; 1 spray (50 mcg total) by Each Nare route once daily.  -     cetirizine (ZYRTEC) 10 MG tablet; Take 1 tablet (10 mg total) by mouth once daily.    Upper respiratory tract infection, unspecified type-likely viral with some overlap of allergies.   - Distilled salt water sinus rinses via neti pots  - Nasal Steroids   - Strongly encouraged adequate hydration and rest.  - Can take acetaminophen or ibuprofen as needed and as directed on bottle for any pain.   - Saline gargle and OTC throat lozenges as needed for sore throat.  - Avoid smoking and alcohol until symptoms improve.  - Hand washing for prevention.      - Return if symptoms worsen or do not improve in 1 week.       Follow up with PCP in 1 year or as needed.

## 2018-10-30 NOTE — TELEPHONE ENCOUNTER
----- Message from Con Randall sent at 10/30/2018 10:40 AM CDT -----  Contact: Gautam Sotomayor            Name of Who is Calling: Gautam Sotomayor      What is the request in detail: Patient called in regards to her appt      Can the clinic reply by [facility]: No      What Number to Call Back [facility]: [phone]

## 2018-10-31 ENCOUNTER — PATIENT MESSAGE (OUTPATIENT)
Dept: PRIMARY CARE CLINIC | Facility: CLINIC | Age: 33
End: 2018-10-31

## 2018-10-31 LAB
HIV 1+2 AB+HIV1 P24 AG SERPL QL IA: NEGATIVE
RPR SER QL: NORMAL

## 2018-11-14 ENCOUNTER — TELEPHONE (OUTPATIENT)
Dept: OBSTETRICS AND GYNECOLOGY | Facility: CLINIC | Age: 33
End: 2018-11-14

## 2018-11-28 ENCOUNTER — PATIENT MESSAGE (OUTPATIENT)
Dept: OBSTETRICS AND GYNECOLOGY | Facility: CLINIC | Age: 33
End: 2018-11-28

## 2018-11-28 ENCOUNTER — TELEPHONE (OUTPATIENT)
Dept: OBSTETRICS AND GYNECOLOGY | Facility: CLINIC | Age: 33
End: 2018-11-28

## 2018-11-28 NOTE — TELEPHONE ENCOUNTER
Good Morning Ms Parmar,  This is Susanne from Dr Galindo's office calling to let you know that we received your Nexplanon device.  Please contact our office at [phone] to schedule your insertion appointment.  Thank You  Susanne

## 2018-11-30 ENCOUNTER — TELEPHONE (OUTPATIENT)
Dept: OBSTETRICS AND GYNECOLOGY | Facility: CLINIC | Age: 33
End: 2018-11-30

## 2018-11-30 NOTE — TELEPHONE ENCOUNTER
Spoke with pt:  Pt was scheudled for her Nexplanon insertion with Dr Galindo.  Pt given date, time, and location instructions.  Pt verbalized understanding.

## 2018-12-20 ENCOUNTER — PROCEDURE VISIT (OUTPATIENT)
Dept: OBSTETRICS AND GYNECOLOGY | Facility: CLINIC | Age: 33
End: 2018-12-20
Payer: COMMERCIAL

## 2018-12-20 VITALS — DIASTOLIC BLOOD PRESSURE: 62 MMHG | WEIGHT: 156.5 LBS | BODY MASS INDEX: 23.11 KG/M2 | SYSTOLIC BLOOD PRESSURE: 100 MMHG

## 2018-12-20 DIAGNOSIS — Z30.9 ENCOUNTER FOR CONTRACEPTIVE MANAGEMENT, UNSPECIFIED TYPE: ICD-10-CM

## 2018-12-20 DIAGNOSIS — Z30.017 NEXPLANON INSERTION: Primary | ICD-10-CM

## 2018-12-20 LAB
B-HCG UR QL: NEGATIVE
CTP QC/QA: YES

## 2018-12-20 PROCEDURE — 81025 URINE PREGNANCY TEST: CPT | Mod: S$GLB,,, | Performed by: OBSTETRICS & GYNECOLOGY

## 2018-12-20 PROCEDURE — 11981 INSERTION DRUG DLVR IMPLANT: CPT | Mod: S$GLB,,, | Performed by: OBSTETRICS & GYNECOLOGY

## 2018-12-20 NOTE — PROCEDURES
Insertion of Nexplanon Device  Date/Time: 12/20/2018 1:37 PM  Performed by: Elfego Galindo MD  Authorized by: Elfego Galindo MD     Consent obtained:  Written  Consent given by:  Patient  Patient questions answered: yes    Patient agrees, verbalizes understanding, and wants to proceed: yes    Educational handouts given: yes    Instructions and paperwork completed: yes    Pre-procedure timeout performed: yes    Prepped with: alcohol 70%    Local anesthetic:  Lidocaine without epinephrine  The site was cleaned and prepped in a sterile fashion: yes    Small stab incision was made in arm: yes    Left/right:  Left   68 mg etonogestrel 68 mg  Preloaded Implanon trocar was placed subdermally: yes    Visualization of implant was obtained: yes    Nexplanon was inserted and trocar removed: yes    Visualization of notch in stilette and palpitation of device: yes    Palpitation confirms placement by provider and patient: yes    Site was closed with steri-strips and pressure bandage applied: yes

## 2019-02-27 RX ORDER — IBUPROFEN 600 MG/1
600 TABLET ORAL EVERY 6 HOURS
Qty: 30 TABLET | Refills: 3 | Status: SHIPPED | OUTPATIENT
Start: 2019-02-27 | End: 2019-08-28 | Stop reason: SDUPTHER

## 2019-03-25 ENCOUNTER — OFFICE VISIT (OUTPATIENT)
Dept: PRIMARY CARE CLINIC | Facility: CLINIC | Age: 34
End: 2019-03-25
Attending: FAMILY MEDICINE
Payer: COMMERCIAL

## 2019-03-25 VITALS
WEIGHT: 158.81 LBS | DIASTOLIC BLOOD PRESSURE: 64 MMHG | HEIGHT: 69 IN | TEMPERATURE: 99 F | SYSTOLIC BLOOD PRESSURE: 114 MMHG | HEART RATE: 81 BPM | BODY MASS INDEX: 23.52 KG/M2

## 2019-03-25 DIAGNOSIS — J30.1 SEASONAL ALLERGIC RHINITIS DUE TO POLLEN: ICD-10-CM

## 2019-03-25 DIAGNOSIS — J30.9 ALLERGIC RHINITIS, UNSPECIFIED SEASONALITY, UNSPECIFIED TRIGGER: Primary | ICD-10-CM

## 2019-03-25 DIAGNOSIS — R09.81 CONGESTION OF PARANASAL SINUS: ICD-10-CM

## 2019-03-25 DIAGNOSIS — R09.81 NASAL CONGESTION: ICD-10-CM

## 2019-03-25 DIAGNOSIS — Z90.81 POST-SPLENECTOMY: ICD-10-CM

## 2019-03-25 PROCEDURE — 99999 PR PBB SHADOW E&M-EST. PATIENT-LVL III: CPT | Mod: PBBFAC,,, | Performed by: FAMILY MEDICINE

## 2019-03-25 PROCEDURE — 96372 PR INJECTION,THERAP/PROPH/DIAG2ST, IM OR SUBCUT: ICD-10-PCS | Mod: S$GLB,,, | Performed by: FAMILY MEDICINE

## 2019-03-25 PROCEDURE — 99213 OFFICE O/P EST LOW 20 MIN: CPT | Mod: 25,S$GLB,, | Performed by: FAMILY MEDICINE

## 2019-03-25 PROCEDURE — 99213 PR OFFICE/OUTPT VISIT, EST, LEVL III, 20-29 MIN: ICD-10-PCS | Mod: 25,S$GLB,, | Performed by: FAMILY MEDICINE

## 2019-03-25 PROCEDURE — 99999 PR PBB SHADOW E&M-EST. PATIENT-LVL III: ICD-10-PCS | Mod: PBBFAC,,, | Performed by: FAMILY MEDICINE

## 2019-03-25 PROCEDURE — 96372 THER/PROPH/DIAG INJ SC/IM: CPT | Mod: S$GLB,,, | Performed by: FAMILY MEDICINE

## 2019-03-25 RX ORDER — TRIAMCINOLONE ACETONIDE 40 MG/ML
40 INJECTION, SUSPENSION INTRA-ARTICULAR; INTRAMUSCULAR
Status: COMPLETED | OUTPATIENT
Start: 2019-03-25 | End: 2019-03-25

## 2019-03-25 RX ORDER — FLUTICASONE PROPIONATE 50 MCG
1 SPRAY, SUSPENSION (ML) NASAL DAILY
Qty: 1 BOTTLE | Refills: 3 | Status: SHIPPED | OUTPATIENT
Start: 2019-03-25 | End: 2023-05-30

## 2019-03-25 RX ORDER — FEXOFENADINE HCL 60 MG
60 TABLET ORAL 2 TIMES DAILY
Qty: 180 TABLET | Refills: 0 | Status: SHIPPED | OUTPATIENT
Start: 2019-03-25 | End: 2019-07-31 | Stop reason: SDUPTHER

## 2019-03-25 RX ORDER — CETIRIZINE HYDROCHLORIDE 10 MG/1
10 TABLET ORAL DAILY
Qty: 30 TABLET | Refills: 4 | Status: SHIPPED | OUTPATIENT
Start: 2019-03-25 | End: 2019-08-07

## 2019-03-25 RX ADMIN — TRIAMCINOLONE ACETONIDE 40 MG: 40 INJECTION, SUSPENSION INTRA-ARTICULAR; INTRAMUSCULAR at 12:03

## 2019-03-25 NOTE — PROGRESS NOTES
"Per order, patient to receive 1mL of Kenalog (triamcinolone acetonide) 40mg/mL. The area of injection was palpated using the medial fold and the iliac crest as anatomical landmarks. Patient was advised to relax the muscle. The area was cleaned with alcohol and allowed to dry. Using a 25g 1.5" needle, 1mL of Kenalog (triamcinolone acetonide) 40mg/mL was placed intramuscularly into the left upper outer gluteal quadrant. Patient experienced no complications and was discharged in stable condition. Kenalog Lot: [id number] Exp: 09/20.  Toi Taylor LPN      "

## 2019-03-27 NOTE — PROGRESS NOTES
Chief Complaint: dizziness, otalgia, itchy ears    History of Present Illness: 33 y.o. female presents for evaluation of dizziness, otalgia, and itchy /painful ears x 2 mos. Pt also w cough, cold congestion and sneezing.  Thinks that this is related to her allergies but now has only worsened. Her last episode was several months ago. She denies hearing loss overall.     She does have a history of sickle cell trait. No migraines or headaches. No prior ear surgery. She has allergies and takes flonase and zyrtec daily -- she has been told she has ETD by her PCP previously.     Review of Systems - Negative except as mentioned in HPI.   History obtained from chart review and the patient  General ROS: negative  Psychological ROS: negative  Ophthalmic ROS: negative  ENT ROS: positive for - nasal congestion, nasal discharge, tinnitus and vertigo  negative for - hearing change  Allergy and Immunology ROS: positive for allergies  Hematological and Lymphatic ROS: negative  Respiratory ROS: no cough, shortness of breath, or wheezing  Cardiovascular ROS: no chest pain or dyspnea on exertion  Gastrointestinal ROS: no abdominal pain, change in bowel habits, or black or bloody stools  Genito-Urinary ROS: no dysuria, trouble voiding, or hematuria  Musculoskeletal ROS: negative  Neurological ROS: no TIA or stroke symptoms  Dermatological ROS: negative    Past Medical History: she  has a past medical history of Abnormal Pap smear of cervix (2016, 2017), Allergic rhinitis, Anemia, Gallstone, Genital herpes (2012), and Sickle cell disease, type SC.    Past Surgical History: she  has a past surgical history that includes Appendectomy; Splenectomy, total; Foot surgery (Left); and [place] tooth extraction.    Social History: she  reports that she has been smoking cigarettes.  She has a 2.50 pack-year smoking history. She has never used smokeless tobacco. She reports that she drinks about 0.6 oz of alcohol per week. She reports that she  does not use drugs.    Family History: family history is not on file.    Medications:     Allergies: she is allergic to nickel.    Physical Exam: heart rrr s1s2 wnl no murmurs  HEENT: b/l ears edematous, erythamtous, pos fluid with crusting along canal  Nares erythematous   Throat erythematous and edematous without exudate noted. Pos cobblestoning  Pos LAD b/l cerv ant      Assessment: seasonal allergic rhinitis due to pollen    Plan:   - Continue allergy medication    - RTC PRN or for annual 6 mos    Gautam was seen today for sinus problem, nasal congestion, otalgia and uri.    Diagnoses and all orders for this visit:    Congestion of paranasal sinus  -     triamcinolone acetonide injection 40 mg    Allergic rhinitis, unspecified seasonality, unspecified trigger  -     fluticasone (FLONASE) 50 mcg/actuation nasal spray; 1 spray (50 mcg total) by Each Nare route once daily.  -     cetirizine (ZYRTEC) 10 MG tablet; Take 1 tablet (10 mg total) by mouth once daily.    Nasal congestion  -     fluticasone (FLONASE) 50 mcg/actuation nasal spray; 1 spray (50 mcg total) by Each Nare route once daily.  -     cetirizine (ZYRTEC) 10 MG tablet; Take 1 tablet (10 mg total) by mouth once daily.    Other orders  -     fexofenadine (ALLEGRA) 60 MG tablet; Take 1 tablet (60 mg total) by mouth 2 (two) times daily.

## 2019-07-31 DIAGNOSIS — J30.1 SEASONAL ALLERGIC RHINITIS DUE TO POLLEN: Primary | ICD-10-CM

## 2019-08-07 RX ORDER — FEXOFENADINE HCL 60 MG
60 TABLET ORAL 2 TIMES DAILY
Qty: 180 TABLET | Refills: 0 | Status: SHIPPED | OUTPATIENT
Start: 2019-08-07 | End: 2020-04-02

## 2019-08-28 ENCOUNTER — OFFICE VISIT (OUTPATIENT)
Dept: PRIMARY CARE CLINIC | Facility: CLINIC | Age: 34
End: 2019-08-28
Attending: FAMILY MEDICINE
Payer: COMMERCIAL

## 2019-08-28 VITALS
DIASTOLIC BLOOD PRESSURE: 70 MMHG | HEIGHT: 69 IN | WEIGHT: 159.5 LBS | HEART RATE: 92 BPM | BODY MASS INDEX: 23.62 KG/M2 | SYSTOLIC BLOOD PRESSURE: 118 MMHG

## 2019-08-28 DIAGNOSIS — F43.0 PANIC ATTACK DUE TO EXCEPTIONAL STRESS: Primary | ICD-10-CM

## 2019-08-28 DIAGNOSIS — R20.0 NUMBNESS OF RIGHT HAND: ICD-10-CM

## 2019-08-28 DIAGNOSIS — F41.0 PANIC ATTACK DUE TO EXCEPTIONAL STRESS: Primary | ICD-10-CM

## 2019-08-28 PROCEDURE — 99214 PR OFFICE/OUTPT VISIT, EST, LEVL IV, 30-39 MIN: ICD-10-PCS | Mod: S$GLB,,, | Performed by: FAMILY MEDICINE

## 2019-08-28 PROCEDURE — 99214 OFFICE O/P EST MOD 30 MIN: CPT | Mod: S$GLB,,, | Performed by: FAMILY MEDICINE

## 2019-08-28 PROCEDURE — 99999 PR PBB SHADOW E&M-EST. PATIENT-LVL III: ICD-10-PCS | Mod: PBBFAC,,, | Performed by: FAMILY MEDICINE

## 2019-08-28 PROCEDURE — 99999 PR PBB SHADOW E&M-EST. PATIENT-LVL III: CPT | Mod: PBBFAC,,, | Performed by: FAMILY MEDICINE

## 2019-08-28 RX ORDER — IBUPROFEN 600 MG/1
600 TABLET ORAL EVERY 6 HOURS
Qty: 90 TABLET | Refills: 1 | Status: SHIPPED | OUTPATIENT
Start: 2019-08-28 | End: 2020-11-09 | Stop reason: SDUPTHER

## 2019-08-28 RX ORDER — SERTRALINE HYDROCHLORIDE 50 MG/1
50 TABLET, FILM COATED ORAL DAILY
Qty: 30 TABLET | Refills: 1 | Status: SHIPPED | OUTPATIENT
Start: 2019-08-28 | End: 2020-11-09

## 2019-08-29 ENCOUNTER — TELEPHONE (OUTPATIENT)
Dept: PRIMARY CARE CLINIC | Facility: CLINIC | Age: 34
End: 2019-08-29

## 2019-08-29 NOTE — TELEPHONE ENCOUNTER
Left voice message for patient to schedule appointment from referral to Hand Clinic.  Kt GUARDADO  [phone]

## 2019-08-30 ENCOUNTER — PATIENT MESSAGE (OUTPATIENT)
Dept: PRIMARY CARE CLINIC | Facility: CLINIC | Age: 34
End: 2019-08-30

## 2019-09-03 PROBLEM — F43.0 PANIC ATTACK DUE TO EXCEPTIONAL STRESS: Status: ACTIVE | Noted: 2019-09-03

## 2019-09-03 PROBLEM — F41.0 PANIC ATTACK DUE TO EXCEPTIONAL STRESS: Status: ACTIVE | Noted: 2019-09-03

## 2019-09-03 RX ORDER — ERGOCALCIFEROL 1.25 MG/1
50000 CAPSULE ORAL
Qty: 12 CAPSULE | Refills: 0 | Status: SHIPPED | OUTPATIENT
Start: 2019-09-03 | End: 2020-11-09

## 2019-09-03 NOTE — PROGRESS NOTES
Subjective:       Patient ID: Gautam Sotomayor is a 34 y.o. female.    Chief Complaint: Anxiety; Stress; Numbness (rt hand); Pain (rt elbow); Abdominal Pain (knots daily x 1 mth); Arthritis (lt knee); and Chest Pain (every other day x 1 m)    Pt presents today with numerous complaints. All that seem to stem from her stress at work that manifests as physical symptoms  Pt was seen in  for SOB and chest pain at work. work up was negative and pt was told to address her stress. Pt states that there is a direct correlation bt preparing for work and onset of symptoms  She experiences CP/SOB/HA's. Denies any n/v/diaphoresis/jaw, neck pain.  Does see a counselor at [facility] but not regularly    Pt does admit that she needs to change jobs due to stress levels    Review of Systems   Constitutional: Positive for activity change, appetite change and fatigue. Negative for chills, diaphoresis, fever and unexpected weight change.   HENT: Negative for congestion, ear pain, sinus pressure, sore throat and trouble swallowing.    Eyes: Negative for photophobia, pain and visual disturbance.   Respiratory: Positive for chest tightness and shortness of breath. Negative for apnea, cough, choking, wheezing and stridor.    Cardiovascular: Positive for chest pain and palpitations. Negative for leg swelling.   Gastrointestinal: Negative for abdominal distention, abdominal pain, constipation, diarrhea, nausea and vomiting.   Genitourinary: Negative.    Musculoskeletal: Negative for back pain, joint swelling and neck pain.   Skin: Negative.    Neurological: Negative for dizziness, tremors, weakness, numbness and headaches.   Psychiatric/Behavioral: Positive for dysphoric mood and sleep disturbance. Negative for behavioral problems, decreased concentration, self-injury and suicidal ideas. The patient is nervous/anxious.    All other systems reviewed and are negative.      Objective:      Physical Exam   Constitutional: She is oriented to  person, place, and time. She appears well-developed and well-nourished.   HENT:   Head: Normocephalic and atraumatic.   Right Ear: External ear normal.   Left Ear: External ear normal.   Nose: Nose normal.   Mouth/Throat: Oropharynx is clear and moist. No oropharyngeal exudate.   Eyes: Pupils are equal, round, and reactive to light. EOM are normal.   Neck: Normal range of motion. Neck supple. No thyromegaly present.   Cardiovascular: Normal rate, regular rhythm, normal heart sounds and intact distal pulses. Exam reveals no gallop and no friction rub.   No murmur heard.  Pulmonary/Chest: Effort normal and breath sounds normal. No stridor. No respiratory distress. She has no wheezes. She has no rales. She exhibits no tenderness.   Abdominal: Soft. Bowel sounds are normal. She exhibits no distension and no mass. There is no tenderness. There is no rebound and no guarding.   Musculoskeletal: Normal range of motion. She exhibits no edema or tenderness.   Lymphadenopathy:     She has no cervical adenopathy.   Neurological: She is alert and oriented to person, place, and time. She has normal reflexes. She displays normal reflexes. No cranial nerve deficit or sensory deficit. She exhibits normal muscle tone. Coordination normal.   Skin: Skin is warm and dry. No erythema.   Psychiatric: She has a normal mood and affect. Her behavior is normal. Judgment and thought content normal.       Assessment:       1. Numbness of right hand        Plan:       Numbness of right hand/CP/SOB: appear to be all related to panic attacks from anxiety related to going to work  Pt advised to seek regular counseling as well as meds. Options presented to pt and pt will do trial fo zoloft. SE's of med d/w pt  ED prompts d/w pt  Denies any SI/HI  Numbness of right hand  -     Ambulatory referral to Hand Surgery    Other orders  -     sertraline (ZOLOFT) 50 MG tablet; Take 1 tablet (50 mg total) by mouth once daily.  Dispense: 30 tablet; Refill: 1  -      ibuprofen (ADVIL,MOTRIN) 600 MG tablet; Take 1 tablet (600 mg total) by mouth every 6 (six) hours.  Dispense: 90 tablet; Refill: 1      RTC prn 4 weeks for med f/u  greater than 45 mins spent w pt reviewing symptoms and plan

## 2020-01-28 ENCOUNTER — OFFICE VISIT (OUTPATIENT)
Dept: URGENT CARE | Facility: CLINIC | Age: 35
End: 2020-01-28
Payer: MEDICAID

## 2020-01-28 VITALS
SYSTOLIC BLOOD PRESSURE: 110 MMHG | HEIGHT: 69 IN | RESPIRATION RATE: 14 BRPM | OXYGEN SATURATION: 99 % | HEART RATE: 76 BPM | TEMPERATURE: 99 F | WEIGHT: 165 LBS | BODY MASS INDEX: 24.44 KG/M2 | DIASTOLIC BLOOD PRESSURE: 66 MMHG

## 2020-01-28 DIAGNOSIS — J01.00 ACUTE NON-RECURRENT MAXILLARY SINUSITIS: ICD-10-CM

## 2020-01-28 DIAGNOSIS — H66.002 ACUTE SUPPURATIVE OTITIS MEDIA OF LEFT EAR WITHOUT SPONTANEOUS RUPTURE OF TYMPANIC MEMBRANE, RECURRENCE NOT SPECIFIED: Primary | ICD-10-CM

## 2020-01-28 DIAGNOSIS — J02.8 BACTERIAL PHARYNGITIS: ICD-10-CM

## 2020-01-28 DIAGNOSIS — B96.89 BACTERIAL PHARYNGITIS: ICD-10-CM

## 2020-01-28 PROCEDURE — 99214 OFFICE O/P EST MOD 30 MIN: CPT | Mod: S$GLB,,, | Performed by: PHYSICIAN ASSISTANT

## 2020-01-28 PROCEDURE — 99214 PR OFFICE/OUTPT VISIT, EST, LEVL IV, 30-39 MIN: ICD-10-PCS | Mod: S$GLB,,, | Performed by: PHYSICIAN ASSISTANT

## 2020-01-28 RX ORDER — AMOXICILLIN AND CLAVULANATE POTASSIUM 875; 125 MG/1; MG/1
1 TABLET, FILM COATED ORAL 2 TIMES DAILY
Qty: 20 TABLET | Refills: 0 | Status: SHIPPED | OUTPATIENT
Start: 2020-01-28 | End: 2020-02-07

## 2020-01-28 NOTE — PATIENT INSTRUCTIONS
Middle Ear Infection (Adult)  You have an infection of the middle ear, the space behind the eardrum. This is also called acute otitis media (AOM). Sometimes it is caused by the common cold. This is because congestion can block the internal passage (eustachian tube) that drains fluid from the middle ear. When the middle ear fills with fluid, bacteria can grow there and cause an infection. Oral antibiotics are used to treat this illness, not ear drops. Symptoms usually start to improve within 1 to 2 days of treatment.    Home care  The following are general care guidelines:  · Finish all of the antibiotic medicine given, even though you may feel better after the first few days.  · You may use over-the-counter medicine, such as acetaminophen or ibuprofen, to control pain and fever, unless something else was prescribed. If you have chronic liver or kidney disease or have ever had a stomach ulcer or gastrointestinal bleeding, talk with your healthcare provider before using these medicines. Do not give aspirin to anyone under 18 years of age who has a fever. It may cause severe illness or death.  Follow-up care  Follow up with your healthcare provider, or as advised, in 2 weeks if all symptoms have not gotten better, or if hearing doesn't go back to normal within 1 month.  When to seek medical advice  Call your healthcare provider right away if any of these occur:  · Ear pain gets worse or does not improve after 3 days of treatment  · Unusual drowsiness or confusion  · Neck pain, stiff neck, or headache  · Fluid or blood draining from the ear canal  · Fever of 100.4°F (38°C) or as advised   · Seizure  Date Last Reviewed: 6/1/2016  © 7539-6397 [employer]. [address] [place] [address], [place] [address]. All rights reserved. This information is not intended as a substitute for professional medical care. Always follow your healthcare professional's instructions.        Sinusitis (Antibiotic Treatment)    The  sinuses are air-filled spaces within the bones of the face. They connect to the inside of the nose. Sinusitis is an inflammation of the tissue lining the sinus cavity. Sinus inflammation can occur during a cold. It can also be due to allergies to pollens and other particles in the air. Sinusitis can cause symptoms of sinus congestion and fullness. A sinus infection causes fever, headache and facial pain. There is often green or yellow drainage from the nose or into the back of the throat (post-nasal drip). You have been given antibiotics to treat this condition.  Home care:  · Take the full course of antibiotics as instructed. Do not stop taking them, even if you feel better.  · Drink plenty of water, hot tea, and other liquids. This may help thin mucus. It also may promote sinus drainage.  · Heat may help soothe painful areas of the face. Use a towel soaked in hot water. Or,  the shower and direct the hot spray onto your face. Using a vaporizer along with a menthol rub at night may also help.   · An expectorant containing guaifenesin may help thin the mucus and promote drainage from the sinuses.  · Over-the-counter decongestants may be used unless a similar medicine was prescribed. Nasal sprays work the fastest. Use one that contains phenylephrine or oxymetazoline. First blow the nose gently. Then use the spray. Do not use these medicines more often than directed on the label or symptoms may get worse. You may also use tablets containing pseudoephedrine. Avoid products that combine ingredients, because side effects may be increased. Read labels. You can also ask the pharmacist for help. (NOTE: Persons with high blood pressure should not use decongestants. They can raise blood pressure.)  · Over-the-counter antihistamines may help if allergies contributed to your sinusitis.    · Do not use nasal rinses or irrigation during an acute sinus infection, unless told to by your health care provider. Rinsing may  spread the infection to other sinuses.  · Use acetaminophen or ibuprofen to control pain, unless another pain medicine was prescribed. (If you have chronic liver or kidney disease or ever had a stomach ulcer, talk with your doctor before using these medicines. Aspirin should never be used in anyone under 18 years of age who is ill with a fever. It may cause severe liver damage.)  · Don't smoke. This can worsen symptoms.  Follow-up care  Follow up with your healthcare provider or our staff if you are not improving within the next week.  When to seek medical advice  Call your healthcare provider if any of these occur:  · Facial pain or headache becoming more severe  · Stiff neck  · Unusual drowsiness or confusion  · Swelling of the forehead or eyelids  · Vision problems, including blurred or double vision  · Fever of 100.4ºF (38ºC) or higher, or as directed by your healthcare provider  · Seizure  · Breathing problems  · Symptoms not resolving within 10 days  Date Last Reviewed: 4/13/2015  © 2298-6984 [employer]. [address] [place] [address]. All rights reserved. This information is not intended as a substitute for professional medical care. Always follow your healthcare professional's instructions.        Self-Care for Sore Throats    Sore throats happen for many reasons, such as colds, allergies, and infections caused by viruses or bacteria. In any case, your throat becomes red and sore. Your goal for self-care is to reduce your discomfort while giving your throat a chance to heal.  Moisten and soothe your throat  Tips include the following:  · Try a sip of water first thing after waking up.  · Keep your throat moist by drinking 6 or more glasses of clear liquids every day.  · Run a cool-air humidifier in your room overnight.  · Avoid cigarette smoke.   · Suck on throat lozenges, cough drops, hard candy, ice chips, or frozen fruit-juice bars. Use the sugar-free versions if your diet or  medical condition requires them.  Gargle to ease irritation  Gargling every hour or 2 can ease irritation. Try gargling with 1 of these solutions:  · 1/4 teaspoon of salt in 1/2 cup of warm water  · An over-the-counter anesthetic gargle  Use medicine for more relief  Over-the-counter medicine can reduce sore throat symptoms. Ask your pharmacist if you have questions about which medicine to use:  · Ease pain with anesthetic sprays. Aspirin or an aspirin substitute also helps. Remember, never give aspirin to anyone 18 or younger, or if you are already taking blood thinners.   · For sore throats caused by allergies, try antihistamines to block the allergic reaction.  · Remember: unless a sore throat is caused by a bacterial infection, antibiotics wont help you.  Prevent future sore throats  Prevention tips include the following:  · Stop smoking or reduce contact with secondhand smoke. Smoke irritates the tender throat lining.  · Limit contact with pets and with allergy-causing substances, such as pollen and mold.  · When youre around someone with a sore throat or cold, wash your hands often to keep viruses or bacteria from spreading.  · Dont strain your vocal cords.  Call your healthcare provider  Contact your healthcare provider if you have:  · A temperature over 101°F (38.3°C)  · White spots on the throat  · Great difficulty swallowing  · Trouble breathing  · A skin rash  · Recent exposure to someone else with strep bacteria  · Severe hoarseness and swollen glands in the neck or jaw   Date Last Reviewed: 8/1/2016  © 1178-7985 [employer]. [address] [place] [address], [place] [address]. All rights reserved. This information is not intended as a substitute for professional medical care. Always follow your healthcare professional's instructions.      Patient Instructions   -Below are suggestions for symptomatic relief:              -Tylenol every 4 hours OR ibuprofen every 6 hours as needed for pain/fever.               -Salt water gargles to soothe throat pain.              -Chloroseptic spray also helps to numb throat pain.              -Nasal saline spray reduces inflammation and dryness.              -Warm face compresses to help with facial sinus pain/pressure.              -Vicks vapor rub at night.              -Flonase OTC or Nasacort OTC for nasal congestion.              -Simple foods like chicken noodle soup.              -Delsym helps with coughing at night              -Zyrtec/Claritin during the day & Benadryl at night may help with allergies.                If you DO NOT have Hypertension or any history of palpitations, it is ok to take over the counter Sudafed or Mucinex D or Allegra-D or Claritin-D or Zyrtec-D.  If you do take one of the above, it is ok to combine that with plain over the counter Mucinex or Allegra or Claritin or Zyrtec. If, for example, you are taking Zyrtec -D, you can combine that with Mucinex, but not Mucinex-D.  If you are taking Mucinex-D, you can combine that with plain Allegra or Claritin or Zyrtec.   If you DO have Hypertension or palpitations, it is safe to take Coricidin HBP for relief of sinus symptoms.    Please follow up with your Primary care provider within 2-5 days if your signs and symptoms have not resolved or worsen.     If your condition worsens or fails to improve we recommend that you receive another evaluation at the emergency room immediately or contact your primary medical clinic to discuss your concerns.   You must understand that you have received an Urgent Care treatment only and that you may be released before all of your medical problems are known or treated. You, the patient, will arrange for follow up care as instructed.     RED FLAGS/WARNING SYMPTOMS DISCUSSED WITH PATIENT THAT WOULD WARRANT EMERGENT MEDICAL ATTENTION. PATIENT VERBALIZED UNDERSTANDING.

## 2020-01-28 NOTE — PROGRESS NOTES
"Subjective:       Patient ID: Gautam Sotomayor is a 34 y.o. female.    Vitals:  height is 5' 9" (1.753 m) and weight is 74.8 kg (165 lb). Her oral temperature is 98.6 °F (37 °C). Her blood pressure is 110/66 and her pulse is 76. Her respiration is 14 and oxygen saturation is 99%.     Chief Complaint: Sinus Problem    Sinus pressure, post nasal drip for 2 and a half weeks. Intermittent sore throat for 2 weeks.    Sinus Problem   The current episode started 1 to 4 weeks ago. The problem has been waxing and waning since onset. There has been no fever. Her pain is at a severity of 4/10. The pain is moderate. Associated symptoms include congestion, a hoarse voice, sinus pressure and a sore throat. Pertinent negatives include no chills, coughing, diaphoresis, ear pain or shortness of breath. Past treatments include oral decongestants and acetaminophen. The treatment provided mild relief.       Constitution: Negative for chills, sweating, fatigue and fever.   HENT: Positive for congestion, postnasal drip, sinus pressure and sore throat. Negative for ear pain, sinus pain and voice change.    Neck: Negative for painful lymph nodes.   Eyes: Negative for eye redness.   Respiratory: Negative for chest tightness, cough, sputum production, bloody sputum, COPD, shortness of breath, stridor, wheezing and asthma.    Gastrointestinal: Negative for nausea and vomiting.   Musculoskeletal: Negative for muscle ache.   Skin: Negative for rash.   Allergic/Immunologic: Negative for seasonal allergies and asthma.   Hematologic/Lymphatic: Negative for swollen lymph nodes.       Objective:      Physical Exam   Constitutional: She is oriented to person, place, and time. She appears well-developed and well-nourished. She is cooperative.  Non-toxic appearance. She does not have a sickly appearance. She does not appear ill. No distress.   HENT:   Head: Normocephalic and atraumatic.   Right Ear: Hearing, external ear and ear canal normal. " Tympanic membrane is injected. A middle ear effusion is present.   Left Ear: Hearing, external ear and ear canal normal. Tympanic membrane is erythematous and bulging. A middle ear effusion is present.   Nose: Mucosal edema, purulent discharge and sinus tenderness present. No rhinorrhea or nasal deformity. No epistaxis. Right sinus exhibits no maxillary sinus tenderness and no frontal sinus tenderness. Left sinus exhibits no maxillary sinus tenderness and no frontal sinus tenderness.   Mouth/Throat: Uvula is midline and mucous membranes are normal. No trismus in the jaw. Normal dentition. No uvula swelling. Posterior oropharyngeal erythema present. No oropharyngeal exudate, posterior oropharyngeal edema or tonsillar abscesses. Tonsils are 2+ on the right. Tonsils are 2+ on the left. Tonsillar exudate.   Eyes: Conjunctivae and lids are normal. No scleral icterus.   Neck: Trachea normal, full passive range of motion without pain and phonation normal. Neck supple. No neck rigidity. No edema and no erythema present.   Cardiovascular: Normal rate, regular rhythm, normal heart sounds, intact distal pulses and normal pulses.   Pulmonary/Chest: Effort normal and breath sounds normal. No accessory muscle usage or stridor. No respiratory distress. She has no decreased breath sounds. She has no wheezes. She has no rhonchi. She has no rales. Chest wall is not dull to percussion. She exhibits no tenderness and no bony tenderness.   Abdominal: Normal appearance.   Musculoskeletal: Normal range of motion. She exhibits no edema or deformity.   Lymphadenopathy:        Head (right side): Submandibular adenopathy present. No submental, no preauricular, no posterior auricular and no occipital adenopathy present.        Head (left side): Submandibular adenopathy present. No submental, no preauricular, no posterior auricular and no occipital adenopathy present.     She has cervical adenopathy.        Right cervical: Superficial cervical  adenopathy present.        Left cervical: Superficial cervical adenopathy present.   Neurological: She is alert and oriented to person, place, and time. She exhibits normal muscle tone. Coordination normal.   Skin: Skin is warm, dry, intact, not diaphoretic and not pale.   Psychiatric: She has a normal mood and affect. Her speech is normal and behavior is normal. Judgment and thought content normal. Cognition and memory are normal.   Nursing note and vitals reviewed.        Assessment:       1. Acute suppurative otitis media of left ear without spontaneous rupture of tympanic membrane, recurrence not specified    2. Bacterial pharyngitis    3. Acute non-recurrent maxillary sinusitis        Plan:         Acute suppurative otitis media of left ear without spontaneous rupture of tympanic membrane, recurrence not specified    Bacterial pharyngitis  -     amoxicillin-clavulanate 875-125mg (AUGMENTIN) 875-125 mg per tablet; Take 1 tablet by mouth 2 (two) times daily. for 10 days  Dispense: 20 tablet; Refill: 0    Acute non-recurrent maxillary sinusitis    Discussed diagnosis with patient as well as treatment and home care. Discussed return to clinic precautions vs ER precautions. All patients questions answered. Patient verbalized understanding. Patient agreed with plan of care.         Middle Ear Infection (Adult)  You have an infection of the middle ear, the space behind the eardrum. This is also called acute otitis media (AOM). Sometimes it is caused by the common cold. This is because congestion can block the internal passage (eustachian tube) that drains fluid from the middle ear. When the middle ear fills with fluid, bacteria can grow there and cause an infection. Oral antibiotics are used to treat this illness, not ear drops. Symptoms usually start to improve within 1 to 2 days of treatment.    Home care  The following are general care guidelines:  · Finish all of the antibiotic medicine given, even though you may  feel better after the first few days.  · You may use over-the-counter medicine, such as acetaminophen or ibuprofen, to control pain and fever, unless something else was prescribed. If you have chronic liver or kidney disease or have ever had a stomach ulcer or gastrointestinal bleeding, talk with your healthcare provider before using these medicines. Do not give aspirin to anyone under 18 years of age who has a fever. It may cause severe illness or death.  Follow-up care  Follow up with your healthcare provider, or as advised, in 2 weeks if all symptoms have not gotten better, or if hearing doesn't go back to normal within 1 month.  When to seek medical advice  Call your healthcare provider right away if any of these occur:  · Ear pain gets worse or does not improve after 3 days of treatment  · Unusual drowsiness or confusion  · Neck pain, stiff neck, or headache  · Fluid or blood draining from the ear canal  · Fever of 100.4°F (38°C) or as advised   · Seizure  Date Last Reviewed: 6/1/2016 © 2000-2017 [employer]. [address] [place] [address]. All rights reserved. This information is not intended as a substitute for professional medical care. Always follow your healthcare professional's instructions.        Sinusitis (Antibiotic Treatment)    The sinuses are air-filled spaces within the bones of the face. They connect to the inside of the nose. Sinusitis is an inflammation of the tissue lining the sinus cavity. Sinus inflammation can occur during a cold. It can also be due to allergies to pollens and other particles in the air. Sinusitis can cause symptoms of sinus congestion and fullness. A sinus infection causes fever, headache and facial pain. There is often green or yellow drainage from the nose or into the back of the throat (post-nasal drip). You have been given antibiotics to treat this condition.  Home care:  · Take the full course of antibiotics as instructed. Do not stop taking  them, even if you feel better.  · Drink plenty of water, hot tea, and other liquids. This may help thin mucus. It also may promote sinus drainage.  · Heat may help soothe painful areas of the face. Use a towel soaked in hot water. Or,  the shower and direct the hot spray onto your face. Using a vaporizer along with a menthol rub at night may also help.   · An expectorant containing guaifenesin may help thin the mucus and promote drainage from the sinuses.  · Over-the-counter decongestants may be used unless a similar medicine was prescribed. Nasal sprays work the fastest. Use one that contains phenylephrine or oxymetazoline. First blow the nose gently. Then use the spray. Do not use these medicines more often than directed on the label or symptoms may get worse. You may also use tablets containing pseudoephedrine. Avoid products that combine ingredients, because side effects may be increased. Read labels. You can also ask the pharmacist for help. (NOTE: Persons with high blood pressure should not use decongestants. They can raise blood pressure.)  · Over-the-counter antihistamines may help if allergies contributed to your sinusitis.    · Do not use nasal rinses or irrigation during an acute sinus infection, unless told to by your health care provider. Rinsing may spread the infection to other sinuses.  · Use acetaminophen or ibuprofen to control pain, unless another pain medicine was prescribed. (If you have chronic liver or kidney disease or ever had a stomach ulcer, talk with your doctor before using these medicines. Aspirin should never be used in anyone under 18 years of age who is ill with a fever. It may cause severe liver damage.)  · Don't smoke. This can worsen symptoms.  Follow-up care  Follow up with your healthcare provider or our staff if you are not improving within the next week.  When to seek medical advice  Call your healthcare provider if any of these occur:  · Facial pain or headache  becoming more severe  · Stiff neck  · Unusual drowsiness or confusion  · Swelling of the forehead or eyelids  · Vision problems, including blurred or double vision  · Fever of 100.4ºF (38ºC) or higher, or as directed by your healthcare provider  · Seizure  · Breathing problems  · Symptoms not resolving within 10 days  Date Last Reviewed: 4/13/2015  © 5531-6336 [employer]. [address] [place] [address], [place] [address]. All rights reserved. This information is not intended as a substitute for professional medical care. Always follow your healthcare professional's instructions.        Self-Care for Sore Throats    Sore throats happen for many reasons, such as colds, allergies, and infections caused by viruses or bacteria. In any case, your throat becomes red and sore. Your goal for self-care is to reduce your discomfort while giving your throat a chance to heal.  Moisten and soothe your throat  Tips include the following:  · Try a sip of water first thing after waking up.  · Keep your throat moist by drinking 6 or more glasses of clear liquids every day.  · Run a cool-air humidifier in your room overnight.  · Avoid cigarette smoke.   · Suck on throat lozenges, cough drops, hard candy, ice chips, or frozen fruit-juice bars. Use the sugar-free versions if your diet or medical condition requires them.  Gargle to ease irritation  Gargling every hour or 2 can ease irritation. Try gargling with 1 of these solutions:  · 1/4 teaspoon of salt in 1/2 cup of warm water  · An over-the-counter anesthetic gargle  Use medicine for more relief  Over-the-counter medicine can reduce sore throat symptoms. Ask your pharmacist if you have questions about which medicine to use:  · Ease pain with anesthetic sprays. Aspirin or an aspirin substitute also helps. Remember, never give aspirin to anyone 18 or younger, or if you are already taking blood thinners.   · For sore throats caused by allergies, try antihistamines to block the  allergic reaction.  · Remember: unless a sore throat is caused by a bacterial infection, antibiotics wont help you.  Prevent future sore throats  Prevention tips include the following:  · Stop smoking or reduce contact with secondhand smoke. Smoke irritates the tender throat lining.  · Limit contact with pets and with allergy-causing substances, such as pollen and mold.  · When youre around someone with a sore throat or cold, wash your hands often to keep viruses or bacteria from spreading.  · Dont strain your vocal cords.  Call your healthcare provider  Contact your healthcare provider if you have:  · A temperature over 101°F (38.3°C)  · White spots on the throat  · Great difficulty swallowing  · Trouble breathing  · A skin rash  · Recent exposure to someone else with strep bacteria  · Severe hoarseness and swollen glands in the neck or jaw   Date Last Reviewed: 8/1/2016  © 5631-6952 [employer]. [address] [place] [address]. All rights reserved. This information is not intended as a substitute for professional medical care. Always follow your healthcare professional's instructions.      Patient Instructions   -Below are suggestions for symptomatic relief:              -Tylenol every 4 hours OR ibuprofen every 6 hours as needed for pain/fever.              -Salt water gargles to soothe throat pain.              -Chloroseptic spray also helps to numb throat pain.              -Nasal saline spray reduces inflammation and dryness.              -Warm face compresses to help with facial sinus pain/pressure.              -Vicks vapor rub at night.              -Flonase OTC or Nasacort OTC for nasal congestion.              -Simple foods like chicken noodle soup.              -Delsym helps with coughing at night              -Zyrtec/Claritin during the day & Benadryl at night may help with allergies.                If you DO NOT have Hypertension or any history of palpitations, it is ok to  take over the counter Sudafed or Mucinex D or Allegra-D or Claritin-D or Zyrtec-D.  If you do take one of the above, it is ok to combine that with plain over the counter Mucinex or Allegra or Claritin or Zyrtec. If, for example, you are taking Zyrtec -D, you can combine that with Mucinex, but not Mucinex-D.  If you are taking Mucinex-D, you can combine that with plain Allegra or Claritin or Zyrtec.   If you DO have Hypertension or palpitations, it is safe to take Coricidin HBP for relief of sinus symptoms.    Please follow up with your Primary care provider within 2-5 days if your signs and symptoms have not resolved or worsen.     If your condition worsens or fails to improve we recommend that you receive another evaluation at the emergency room immediately or contact your primary medical clinic to discuss your concerns.   You must understand that you have received an Urgent Care treatment only and that you may be released before all of your medical problems are known or treated. You, the patient, will arrange for follow up care as instructed.     RED FLAGS/WARNING SYMPTOMS DISCUSSED WITH PATIENT THAT WOULD WARRANT EMERGENT MEDICAL ATTENTION. PATIENT VERBALIZED UNDERSTANDING.

## 2020-04-01 ENCOUNTER — PATIENT MESSAGE (OUTPATIENT)
Dept: PRIMARY CARE CLINIC | Facility: CLINIC | Age: 35
End: 2020-04-01

## 2020-04-02 ENCOUNTER — OFFICE VISIT (OUTPATIENT)
Dept: FAMILY MEDICINE | Facility: CLINIC | Age: 35
End: 2020-04-02
Payer: COMMERCIAL

## 2020-04-02 DIAGNOSIS — J30.1 SEASONAL ALLERGIC RHINITIS DUE TO POLLEN: Primary | ICD-10-CM

## 2020-04-02 PROCEDURE — 99213 OFFICE O/P EST LOW 20 MIN: CPT | Mod: 95,,, | Performed by: FAMILY MEDICINE

## 2020-04-02 PROCEDURE — 99213 PR OFFICE/OUTPT VISIT, EST, LEVL III, 20-29 MIN: ICD-10-PCS | Mod: 95,,, | Performed by: FAMILY MEDICINE

## 2020-04-02 RX ORDER — LEVOCETIRIZINE DIHYDROCHLORIDE 5 MG/1
5 TABLET, FILM COATED ORAL NIGHTLY
Qty: 30 TABLET | Refills: 11 | Status: SHIPPED | OUTPATIENT
Start: 2020-04-02 | End: 2021-06-29

## 2020-04-02 RX ORDER — MONTELUKAST SODIUM 10 MG/1
10 TABLET ORAL NIGHTLY
Qty: 30 TABLET | Refills: 3 | Status: SHIPPED | OUTPATIENT
Start: 2020-04-02 | End: 2020-05-02

## 2020-04-02 NOTE — PROGRESS NOTES
[facility]  Progress Note    SUBJECTIVE:     Chief Complaint   Patient presents with    Sinus Problem     The patient location is: home  The chief complaint leading to consultation is: sinus issues  Visit type: Virtual visit with synchronous audio and video  Total time spent with patient: 25  Each patient to whom he or she provides medical services by telemedicine is:  (1) informed of the relationship between the physician and patient and the respective role of any other health care provider with respect to management of the patient; and (2) notified that he or she may decline to receive medical services by telemedicine and may withdraw from such care at any time.      HPI   Gautam Sotomayor  is a 34 y.o. female via video visit, for c/o sinus drip, cough, congestion, L ear pain for over the past week. Admits does have seasonal allergies. Saw urgent care and was given abx which didn't help. No fevers, chills, SOB. Cough non-productive. No known sick contacts, or recent travels.     Review of patient's allergies indicates:   Allergen Reactions    Nickel Rash       Past Medical History:   Diagnosis Date    Abnormal Pap smear of cervix 2016, 2017    colpo    Allergic rhinitis     Anemia     Gallstone     Genital herpes 2012    Outbreaks 1-2 per year, last outbreak 8/2017    Sickle cell disease, type [place]     Last crisis 3/2017     Past Surgical History:   Procedure Laterality Date    APPENDECTOMY      FOOT SURGERY Left     SPLENECTOMY, TOTAL      WISDOM TOOTH EXTRACTION       Family History   Problem Relation Age of Onset    Breast cancer Neg Hx     Colon cancer Neg Hx     Ovarian cancer Neg Hx      Social History     Tobacco Use    Smoking status: Current Every Day Smoker     Packs/day: 0.25     Years: 10.00     Pack years: 2.50     Types: Cigarettes    Smokeless tobacco: Never Used    Tobacco comment: black and milds   Substance Use Topics    Alcohol use: Yes     Alcohol/week: 1.0  standard drinks     Types: 1 Cans of beer per week     Comment: socially    Drug use: No        Review of Systems   Constitutional: Negative for chills, fever and malaise/fatigue.   HENT: Positive for congestion and ear pain (mild L ear pain). Negative for sinus pain and sore throat.    Respiratory: Positive for cough. Negative for sputum production, shortness of breath and wheezing.    Cardiovascular: Negative.  Negative for chest pain.   Gastrointestinal: Negative.  Negative for abdominal pain, nausea and vomiting.   Genitourinary: Negative.    Neurological: Negative for weakness and headaches.   All other systems reviewed and are negative.    OBJECTIVE:   There were no vitals filed for this visit.  There is no height or weight on file to calculate BMI.    Physical Exam   Constitutional: She is oriented to person, place, and time and well-developed, well-nourished, and in no distress. No distress.   HENT:   Head: Normocephalic and atraumatic.   Eyes: Conjunctivae and EOM are normal.   Pulmonary/Chest: Effort normal.   Neurological: She is alert and oriented to person, place, and time.   Skin: She is not diaphoretic.       Old records were reviewed. Labs and/or images were independently reviewed.    ASSESSMENT     1. Seasonal allergic rhinitis due to pollen        PLAN:     Seasonal allergic rhinitis due to pollen  -     montelukast (SINGULAIR) 10 mg tablet; Take 1 tablet (10 mg total) by mouth every evening.  Dispense: 30 tablet; Refill: 3  -     levocetirizine (XYZAL) 5 MG tablet; Take 1 tablet (5 mg total) by mouth every evening.  Dispense: 30 tablet; Refill: 11  -    I have discussed the common side effects of this medication with the patient and answered all of the questions they had at the time of this visit regarding this medication.  -    Also recommend patient get Netipot to flush sinuses.      RTC PRN    Kenny Chawla MD  04/02/2020 3:08 PM

## 2020-04-21 DIAGNOSIS — Z01.84 ANTIBODY RESPONSE EXAMINATION: ICD-10-CM

## 2020-05-18 ENCOUNTER — TELEPHONE (OUTPATIENT)
Dept: OPTOMETRY | Facility: CLINIC | Age: 35
End: 2020-05-18

## 2020-05-21 DIAGNOSIS — Z01.84 ANTIBODY RESPONSE EXAMINATION: ICD-10-CM

## 2020-06-20 DIAGNOSIS — Z01.84 ANTIBODY RESPONSE EXAMINATION: ICD-10-CM

## 2020-07-20 DIAGNOSIS — Z01.84 ANTIBODY RESPONSE EXAMINATION: ICD-10-CM

## 2020-08-19 DIAGNOSIS — Z01.84 ANTIBODY RESPONSE EXAMINATION: ICD-10-CM

## 2020-09-18 DIAGNOSIS — Z01.84 ANTIBODY RESPONSE EXAMINATION: ICD-10-CM

## 2020-10-18 DIAGNOSIS — Z01.84 ANTIBODY RESPONSE EXAMINATION: ICD-10-CM

## 2020-11-09 ENCOUNTER — OFFICE VISIT (OUTPATIENT)
Dept: FAMILY MEDICINE | Facility: CLINIC | Age: 35
End: 2020-11-09
Payer: COMMERCIAL

## 2020-11-09 VITALS
OXYGEN SATURATION: 99 % | WEIGHT: 171.75 LBS | DIASTOLIC BLOOD PRESSURE: 60 MMHG | HEIGHT: 69 IN | HEART RATE: 99 BPM | BODY MASS INDEX: 25.44 KG/M2 | SYSTOLIC BLOOD PRESSURE: 110 MMHG | TEMPERATURE: 99 F

## 2020-11-09 DIAGNOSIS — Z72.0 TOBACCO ABUSE: ICD-10-CM

## 2020-11-09 DIAGNOSIS — M25.569 KNEE PAIN, UNSPECIFIED CHRONICITY, UNSPECIFIED LATERALITY: ICD-10-CM

## 2020-11-09 DIAGNOSIS — Z00.00 ROUTINE PHYSICAL EXAMINATION: Primary | ICD-10-CM

## 2020-11-09 DIAGNOSIS — D57.20 SICKLE CELL-HEMOGLOBIN C DISEASE WITHOUT CRISIS: ICD-10-CM

## 2020-11-09 DIAGNOSIS — E04.9 GOITER: ICD-10-CM

## 2020-11-09 DIAGNOSIS — K14.8 TONGUE LESION: ICD-10-CM

## 2020-11-09 PROCEDURE — 99395 PR PREVENTIVE VISIT,EST,18-39: ICD-10-PCS | Mod: S$GLB,,, | Performed by: FAMILY MEDICINE

## 2020-11-09 PROCEDURE — 99999 PR PBB SHADOW E&M-EST. PATIENT-LVL V: CPT | Mod: PBBFAC,,, | Performed by: FAMILY MEDICINE

## 2020-11-09 PROCEDURE — 99395 PREV VISIT EST AGE 18-39: CPT | Mod: S$GLB,,, | Performed by: FAMILY MEDICINE

## 2020-11-09 PROCEDURE — 99999 PR PBB SHADOW E&M-EST. PATIENT-LVL V: ICD-10-PCS | Mod: PBBFAC,,, | Performed by: FAMILY MEDICINE

## 2020-11-09 RX ORDER — IBUPROFEN 600 MG/1
600 TABLET ORAL EVERY 6 HOURS
Qty: 90 TABLET | Refills: 1 | Status: SHIPPED | OUTPATIENT
Start: 2020-11-09 | End: 2022-04-12 | Stop reason: SDUPTHER

## 2020-11-09 RX ORDER — MONTELUKAST SODIUM 10 MG/1
10 TABLET ORAL NIGHTLY
COMMUNITY
Start: 2020-11-02 | End: 2020-11-24

## 2020-11-09 NOTE — PROGRESS NOTES
[facility]  Progress Note    SUBJECTIVE:     Chief Complaint   Patient presents with    Back Pain    Fatigue       HPI   Gautam Sotomayor  is a 35 y.o. female here for routine physical exam. Does have some issues with fatigue which she only gets 6 hours of sleep at night. Has occasional knee pain but takes ibuprofen which helps. Lastly, has been having some tongue lesions which are white. Was dx with lichen planus but never followed up. Patient has no other new complaints/problems at this time.      Review of patient's allergies indicates:   Allergen Reactions    Nickel Rash       Past Medical History:   Diagnosis Date    Abnormal Pap smear of cervix 2016, 2017    colpo    Allergic rhinitis     Anemia     Gallstone     Genital herpes 2012    Outbreaks 1-2 per year, last outbreak 8/2017    Sickle cell disease, type SC     Last crisis 3/2017     Past Surgical History:   Procedure Laterality Date    APPENDECTOMY      FOOT SURGERY Left     SPLENECTOMY, TOTAL      WISDOM TOOTH EXTRACTION       Family History   Problem Relation Age of Onset    Breast cancer Neg Hx     Colon cancer Neg Hx     Ovarian cancer Neg Hx      Social History     Tobacco Use    Smoking status: Current Every Day Smoker     Packs/day: 0.25     Years: 10.00     Pack years: 2.50     Types: Cigarettes    Smokeless tobacco: Never Used    Tobacco comment: black and milds   Substance Use Topics    Alcohol use: Yes     Alcohol/week: 1.0 standard drinks     Types: 1 Cans of beer per week     Comment: socially    Drug use: No        Review of Systems   Constitutional: Negative for chills, diaphoresis and fever.   HENT: Negative for congestion, ear pain and sore throat.    Eyes: Negative for photophobia and discharge.   Respiratory: Negative for cough, shortness of breath and wheezing.    Cardiovascular: Negative for chest pain and palpitations.   Gastrointestinal: Negative for abdominal pain, constipation, diarrhea, nausea  and vomiting.   Genitourinary: Negative for dysuria and hematuria.   Musculoskeletal: Negative for back pain and myalgias.   Skin: Negative for itching and rash.   Neurological: Negative for dizziness, sensory change, focal weakness, weakness and headaches.   Psychiatric/Behavioral: The patient has insomnia.    All other systems reviewed and are negative.    OBJECTIVE:     Vitals:    11/09/20 1506   BP: 110/60   Pulse: 99   Temp: 98.7 °F (37.1 °C)     Body mass index is 25.36 kg/m².    Physical Exam   Constitutional: She is oriented to person, place, and time and well-developed, well-nourished, and in no distress. No distress.   HENT:   Head: Normocephalic and atraumatic.   Right Ear: Tympanic membrane is not perforated, not erythematous and not bulging. No hemotympanum.   Left Ear: Tympanic membrane is not perforated, not erythematous and not bulging. No hemotympanum.   Mouth/Throat: Oropharynx is clear and moist. No oropharyngeal exudate.   + white tongue lesions.   Eyes: Pupils are equal, round, and reactive to light. Conjunctivae and EOM are normal.   Neck: No thyromegaly present.   + goiter   Cardiovascular: Normal rate, regular rhythm and normal heart sounds. Exam reveals no gallop and no friction rub.   No murmur heard.  Pulmonary/Chest: Effort normal and breath sounds normal. No respiratory distress. She has no wheezes. She has no rales.   Abdominal: Soft. Bowel sounds are normal. She exhibits no distension. There is no abdominal tenderness. There is no rebound and no guarding.   Musculoskeletal: Normal range of motion.         General: No tenderness or edema.   Lymphadenopathy:     She has no cervical adenopathy.   Neurological: She is alert and oriented to person, place, and time.   Skin: Skin is warm. No rash noted. She is not diaphoretic. No erythema.       Old records were reviewed. Labs and/or images were independently reviewed.    ASSESSMENT     1. Routine physical examination    2. Tongue lesion     3. Goiter    4. Knee pain, unspecified chronicity, unspecified laterality    5. Tobacco abuse    6. 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 All questions and comments addressed.    Tongue lesion  -     Ambulatory referral/consult to ENT; Future; Expected date: 11/16/2020, for further evaluation and treatment.    Goiter  -     CBC Auto Differential; Future  -     Comprehensive Metabolic Panel; Future  -     Hemoglobin A1C; Future  -     Lipid Panel; Future  -     TSH; Future  -     T4, Free; Future  -     US Soft Tissue Head Neck Thyroid; Future; Expected date: 11/09/2020    Knee pain, unspecified chronicity, unspecified laterality  -     ibuprofen (ADVIL,MOTRIN) 600 MG tablet; Take 1 tablet (600 mg total) by mouth every 6 (six) hours.  Dispense: 90 tablet; Refill: 1    Tobacco abuse  -     Ambulatory referral/consult to Smoking Cessation Program; Future; Expected date: 11/16/2020  -     Counseled patient about importance of smoking cessation. Patient ready to quit. Will start smoking cessation treatment options.    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

## 2020-11-10 ENCOUNTER — TELEPHONE (OUTPATIENT)
Dept: HEMATOLOGY/ONCOLOGY | Facility: CLINIC | Age: 35
End: 2020-11-10

## 2020-11-11 ENCOUNTER — TELEPHONE (OUTPATIENT)
Dept: FAMILY MEDICINE | Facility: CLINIC | Age: 35
End: 2020-11-11

## 2020-11-11 ENCOUNTER — TELEPHONE (OUTPATIENT)
Dept: HEMATOLOGY/ONCOLOGY | Facility: CLINIC | Age: 35
End: 2020-11-11

## 2020-11-12 ENCOUNTER — TELEPHONE (OUTPATIENT)
Dept: HEMATOLOGY/ONCOLOGY | Facility: CLINIC | Age: 35
End: 2020-11-12

## 2020-11-17 DIAGNOSIS — Z01.84 ANTIBODY RESPONSE EXAMINATION: ICD-10-CM

## 2020-11-24 DIAGNOSIS — J30.1 SEASONAL ALLERGIC RHINITIS DUE TO POLLEN: Primary | ICD-10-CM

## 2020-11-24 RX ORDER — MONTELUKAST SODIUM 10 MG/1
TABLET ORAL
Qty: 90 TABLET | Refills: 0 | Status: SHIPPED | OUTPATIENT
Start: 2020-11-24 | End: 2021-10-21 | Stop reason: SDUPTHER

## 2020-11-24 NOTE — TELEPHONE ENCOUNTER
No new care gaps identified.  Powered by [employer]. Reference number: [id number]. 11/24/2020 10:31:43 AM   CST

## 2020-12-03 ENCOUNTER — OFFICE VISIT (OUTPATIENT)
Dept: HEMATOLOGY/ONCOLOGY | Facility: CLINIC | Age: 35
End: 2020-12-03
Attending: INTERNAL MEDICINE
Payer: COMMERCIAL

## 2020-12-03 ENCOUNTER — OFFICE VISIT (OUTPATIENT)
Dept: OBSTETRICS AND GYNECOLOGY | Facility: CLINIC | Age: 35
End: 2020-12-03
Payer: COMMERCIAL

## 2020-12-03 ENCOUNTER — HOSPITAL ENCOUNTER (OUTPATIENT)
Dept: RADIOLOGY | Facility: OTHER | Age: 35
Discharge: HOME | End: 2020-12-03
Attending: FAMILY MEDICINE
Payer: COMMERCIAL

## 2020-12-03 VITALS
DIASTOLIC BLOOD PRESSURE: 59 MMHG | HEIGHT: 69 IN | WEIGHT: 169.75 LBS | BODY MASS INDEX: 25.14 KG/M2 | TEMPERATURE: 98 F | SYSTOLIC BLOOD PRESSURE: 110 MMHG | RESPIRATION RATE: 14 BRPM | OXYGEN SATURATION: 100 % | HEART RATE: 75 BPM

## 2020-12-03 VITALS
WEIGHT: 169.75 LBS | DIASTOLIC BLOOD PRESSURE: 78 MMHG | SYSTOLIC BLOOD PRESSURE: 120 MMHG | HEIGHT: 69 IN | BODY MASS INDEX: 25.14 KG/M2

## 2020-12-03 DIAGNOSIS — E04.9 GOITER: ICD-10-CM

## 2020-12-03 DIAGNOSIS — Z30.016 ENCOUNTER FOR INITIAL PRESCRIPTION OF TRANSDERMAL PATCH HORMONAL CONTRACEPTIVE DEVICE: ICD-10-CM

## 2020-12-03 DIAGNOSIS — Z00.00 ROUTINE PHYSICAL EXAMINATION: ICD-10-CM

## 2020-12-03 DIAGNOSIS — D57.20 SICKLE CELL-HEMOGLOBIN C DISEASE WITHOUT CRISIS: Primary | ICD-10-CM

## 2020-12-03 DIAGNOSIS — Z30.46 NEXPLANON REMOVAL: Primary | ICD-10-CM

## 2020-12-03 PROCEDURE — 1126F PR PAIN SEVERITY QUANTIFIED, NO PAIN PRESENT: ICD-10-PCS | Mod: S$GLB,,, | Performed by: INTERNAL MEDICINE

## 2020-12-03 PROCEDURE — 3008F BODY MASS INDEX DOCD: CPT | Mod: CPTII,S$GLB,, | Performed by: INTERNAL MEDICINE

## 2020-12-03 PROCEDURE — 1126F AMNT PAIN NOTED NONE PRSNT: CPT | Mod: S$GLB,,, | Performed by: INTERNAL MEDICINE

## 2020-12-03 PROCEDURE — 99499 UNLISTED E&M SERVICE: CPT | Mod: S$GLB,,, | Performed by: OBSTETRICS & GYNECOLOGY

## 2020-12-03 PROCEDURE — 99999 PR PBB SHADOW E&M-EST. PATIENT-LVL III: CPT | Mod: PBBFAC,,, | Performed by: OBSTETRICS & GYNECOLOGY

## 2020-12-03 PROCEDURE — 76536 US EXAM OF HEAD AND NECK: CPT | Mod: TC

## 2020-12-03 PROCEDURE — 11982 REMOVE DRUG IMPLANT DEVICE: CPT | Mod: S$GLB,,, | Performed by: OBSTETRICS & GYNECOLOGY

## 2020-12-03 PROCEDURE — 3008F BODY MASS INDEX DOCD: CPT | Mod: CPTII,S$GLB,, | Performed by: OBSTETRICS & GYNECOLOGY

## 2020-12-03 PROCEDURE — 99999 PR PBB SHADOW E&M-EST. PATIENT-LVL III: ICD-10-PCS | Mod: PBBFAC,,, | Performed by: INTERNAL MEDICINE

## 2020-12-03 PROCEDURE — 3008F PR BODY MASS INDEX (BMI) DOCUMENTED: ICD-10-PCS | Mod: CPTII,S$GLB,, | Performed by: INTERNAL MEDICINE

## 2020-12-03 PROCEDURE — 3008F PR BODY MASS INDEX (BMI) DOCUMENTED: ICD-10-PCS | Mod: CPTII,S$GLB,, | Performed by: OBSTETRICS & GYNECOLOGY

## 2020-12-03 PROCEDURE — 1126F PR PAIN SEVERITY QUANTIFIED, NO PAIN PRESENT: ICD-10-PCS | Mod: S$GLB,,, | Performed by: OBSTETRICS & GYNECOLOGY

## 2020-12-03 PROCEDURE — 99499 NO LOS: ICD-10-PCS | Mod: S$GLB,,, | Performed by: OBSTETRICS & GYNECOLOGY

## 2020-12-03 PROCEDURE — 11982 REMOVAL OF NEXPLANON DEVICE: ICD-10-PCS | Mod: S$GLB,,, | Performed by: OBSTETRICS & GYNECOLOGY

## 2020-12-03 PROCEDURE — 99212 OFFICE O/P EST SF 10 MIN: CPT | Mod: S$GLB,,, | Performed by: INTERNAL MEDICINE

## 2020-12-03 PROCEDURE — 76536 US EXAM OF HEAD AND NECK: CPT | Mod: 26,,, | Performed by: RADIOLOGY

## 2020-12-03 PROCEDURE — 99999 PR PBB SHADOW E&M-EST. PATIENT-LVL III: CPT | Mod: PBBFAC,,, | Performed by: INTERNAL MEDICINE

## 2020-12-03 PROCEDURE — 99212 PR OFFICE/OUTPT VISIT, EST, LEVL II, 10-19 MIN: ICD-10-PCS | Mod: S$GLB,,, | Performed by: INTERNAL MEDICINE

## 2020-12-03 PROCEDURE — 99999 PR PBB SHADOW E&M-EST. PATIENT-LVL III: ICD-10-PCS | Mod: PBBFAC,,, | Performed by: OBSTETRICS & GYNECOLOGY

## 2020-12-03 PROCEDURE — 76536 US SOFT TISSUE HEAD NECK THYROID: ICD-10-PCS | Mod: 26,,, | Performed by: RADIOLOGY

## 2020-12-03 PROCEDURE — 1126F AMNT PAIN NOTED NONE PRSNT: CPT | Mod: S$GLB,,, | Performed by: OBSTETRICS & GYNECOLOGY

## 2020-12-03 RX ORDER — NORELGESTROMIN AND ETHINYL ESTRADIOL 35; 150 UG/MG; UG/MG
1 PATCH TRANSDERMAL
Qty: 4 PATCH | Refills: 11 | Status: SHIPPED | OUTPATIENT
Start: 2020-12-03 | End: 2022-04-12

## 2020-12-03 NOTE — PROCEDURES
Removal of Nexplanon Device    Date/Time: 12/3/2020 2:00 PM  Performed by: Elfego Galindo MD  Authorized by: Elfego Galindo MD   Preparation: Patient was prepped and draped in the usual sterile fashion.  Local anesthesia used: yes  Anesthesia: local infiltration    Anesthesia:  Local anesthesia used: yes  Local Anesthetic: lidocaine 1% without epinephrine  Anesthetic total: 1.5 mL    Sedation:  Patient sedated: no    Patient tolerance: patient tolerated the procedure well with no immediate complications  Comments: After identifying the nexplanon, the distal end was cleaned with alcohol and infiltrated with 2cc of 1% lidocaine underneath the nexplanon device.  After waiting 5 minutes, the site was prepped with betatine x3.  A 5mm skin incision was made over the end of the device and the incision was carried down until the device was found.  Once the capsule was released, the device was removed intact using a hemostat.  A pressure dressing was placed over the incision.  The patient tolerated the procedure well.

## 2020-12-03 NOTE — PROGRESS NOTES
Subjective:       Patient ID: Gautam Sotomayor is a 35 y.o. female.    Chief Complaint: No chief complaint on file.    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      This diagnosis was confirmed in March 2017 when she was seen by Dr Yao Le in Hematology.  A hemoglobin electrophoresis at   that time had 50% hemoglobin S, 43% hemoglobin C and 3.2% hemoglobin A2.    Subsequently she had an uneventful pregnancy and delivered a boy a in March 2018.  He has hemoglobin C trait.    Her last CBC in October 2018 showed a hemoglobin of 11.5.    She reports that she has had no problems since her last visit with Hematology.  She had an occasional crisis when she was younger and into adulthood.    Review of Systems   Constitutional: Positive for fatigue (Occasional).   Respiratory: Negative.    Cardiovascular: Negative.    Neurological: Negative.    Psychiatric/Behavioral: Negative.          Objective:      Physical Exam  Vitals signs reviewed.   Constitutional:       General: She is not in acute distress.     Appearance: Normal appearance.   Neurological:      Mental Status: She is alert and oriented to person, place, and time.   Psychiatric:         Mood and Affect: Mood normal.         Thought Content: Thought content normal.         Judgment: Judgment normal.         Assessment:       1. 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  Based on her specific hemoglobinopathy and lack of symptoms she needs no routine hematology follow-up at this time.

## 2020-12-03 NOTE — LETTER
December 3, 2020      Kenny Chawla MD  [address]  [place] [address]           [facility] [address]  [address], [address]  [facility] [address]  Phone: [phone]          Patient: Gautam Sotomayor   MR Number: [id number]   YOB: 1985   Date of Visit: 12/3/2020       Dear Dr. Kenny Chawla:    Thank you for referring Gautam Sotomayor to me for evaluation. Attached you will find relevant portions of my assessment and plan of care.    If you have questions, please do not hesitate to call me. I look forward to following Gautam Sotomayor along with you.    Sincerely,    Efe Harvey MD    Enclosure  CC:  No Recipients    If you would like to receive this communication electronically, please contact [email or web address] or [phone] to request more information on [employer] access.    For providers and/or their staff who would like to refer a patient to [facility], please contact us through our one-stop-shop provider referral line, [facility] [place], at [phone].    If you feel you have received this communication in error or would no longer like to receive these types of communications, please e-mail [email or web address]

## 2020-12-04 ENCOUNTER — TELEPHONE (OUTPATIENT)
Dept: FAMILY MEDICINE | Facility: CLINIC | Age: 35
End: 2020-12-04

## 2020-12-04 DIAGNOSIS — E04.1 THYROID NODULE: Primary | ICD-10-CM

## 2020-12-04 NOTE — TELEPHONE ENCOUNTER
Out of office today.    We want to biopsy it just to be on the safe side. Doesn't seem like anything serious right now.

## 2020-12-04 NOTE — TELEPHONE ENCOUNTER
Spoke with patient informed of finformation charted by .Patient is requesting a call from to further discuss recommendations.Thanks

## 2020-12-08 ENCOUNTER — TELEPHONE (OUTPATIENT)
Dept: FAMILY MEDICINE | Facility: CLINIC | Age: 35
End: 2020-12-08

## 2020-12-18 ENCOUNTER — OFFICE VISIT (OUTPATIENT)
Dept: OTOLARYNGOLOGY | Facility: CLINIC | Age: 35
End: 2020-12-18
Payer: COMMERCIAL

## 2020-12-18 VITALS
HEART RATE: 71 BPM | BODY MASS INDEX: 26.01 KG/M2 | DIASTOLIC BLOOD PRESSURE: 78 MMHG | SYSTOLIC BLOOD PRESSURE: 133 MMHG | WEIGHT: 176.13 LBS

## 2020-12-18 DIAGNOSIS — E04.1 THYROID NODULE: ICD-10-CM

## 2020-12-18 PROCEDURE — 1126F AMNT PAIN NOTED NONE PRSNT: CPT | Mod: S$GLB,,, | Performed by: OTOLARYNGOLOGY

## 2020-12-18 PROCEDURE — 1126F PR PAIN SEVERITY QUANTIFIED, NO PAIN PRESENT: ICD-10-PCS | Mod: S$GLB,,, | Performed by: OTOLARYNGOLOGY

## 2020-12-18 PROCEDURE — 99243 OFF/OP CNSLTJ NEW/EST LOW 30: CPT | Mod: S$GLB,,, | Performed by: OTOLARYNGOLOGY

## 2020-12-18 PROCEDURE — 99243 PR OFFICE CONSULTATION,LEVEL III: ICD-10-PCS | Mod: S$GLB,,, | Performed by: OTOLARYNGOLOGY

## 2020-12-18 PROCEDURE — 99999 PR PBB SHADOW E&M-EST. PATIENT-LVL III: CPT | Mod: PBBFAC,,, | Performed by: OTOLARYNGOLOGY

## 2020-12-18 PROCEDURE — 3008F BODY MASS INDEX DOCD: CPT | Mod: CPTII,S$GLB,, | Performed by: OTOLARYNGOLOGY

## 2020-12-18 PROCEDURE — 3008F PR BODY MASS INDEX (BMI) DOCUMENTED: ICD-10-PCS | Mod: CPTII,S$GLB,, | Performed by: OTOLARYNGOLOGY

## 2020-12-18 PROCEDURE — 99999 PR PBB SHADOW E&M-EST. PATIENT-LVL III: ICD-10-PCS | Mod: PBBFAC,,, | Performed by: OTOLARYNGOLOGY

## 2020-12-18 NOTE — LETTER
December 18, 2020      Kenny Chawla MD  [address]  [place] [address]           [facility]  [address]  [facility] [address]  Phone: [phone]  Fax: [phone]          Patient: Gautam Sotomayor   MR Number: [id number]   YOB: 1985   Date of Visit: 12/18/2020       Dear Dr. Kenny Chawla:    Thank you for referring Gautam Sotomayor to me for evaluation. Attached you will find relevant portions of my assessment and plan of care.    If you have questions, please do not hesitate to call me. I look forward to following Gautam Sotomayor along with you.    Sincerely,    Layo Heath MD    Enclosure  CC:  No Recipients    If you would like to receive this communication electronically, please contact [email or web address] or [phone] to request more information on [employer] Link access.    For providers and/or their staff who would like to refer a patient to [facility], please contact us through our one-stop-shop provider referral line, [facility], at [phone].    If you feel you have received this communication in error or would no longer like to receive these types of communications, please e-mail [email or web address]

## 2020-12-18 NOTE — PROGRESS NOTES
Head and Neck Surgery Consult    Seen in consultation from Dr Chawla    HPI: Gautam Sotomayor is a 35 y.o. female presenting with a 1.6cm spongiform thyroid nodule in the L pole without microcalcifications. This has been stable since 2016 and she was monitoring with U/S. She denies voice changes or history/family history of endocrine malignancy. He mother does have thyroid nodules. She denies ionizing radiation exposure. She denies new neck masses or LAD, her thyroid has not changed in appearance. She denies hypo or hyperthyroid symptoms.    Past Medical History:   Diagnosis Date    Abnormal Pap smear of cervix 2016, 2017    colpo    Allergic rhinitis     Anemia     Gallstone     Genital herpes 2012    Outbreaks 1-2 per year, last outbreak 8/2017    Sickle cell disease, type SC     Last crisis 3/2017       Past Surgical History:   Procedure Laterality Date    APPENDECTOMY      FOOT SURGERY Left     SPLENECTOMY, TOTAL      WISDOM TOOTH EXTRACTION           Current Outpatient Medications:     fluconazole (DIFLUCAN) 150 MG Tab, daily as needed. , Disp: , Rfl: 0    fluticasone (FLONASE) 50 mcg/actuation nasal spray, 1 spray (50 mcg total) by Each Nare route once daily. (Patient taking differently: 1 spray by Each Nostril route daily as needed. ), Disp: 1 Bottle, Rfl: 3    folic acid (FOLVITE) 1 MG tablet, TAKE 4 TABLETS (4 MG TOTAL) BY MOUTH ONCE DAILY., Disp: 120 tablet, Rfl: 4    ibuprofen (ADVIL,MOTRIN) 600 MG tablet, Take 1 tablet (600 mg total) by mouth every 6 (six) hours. (Patient taking differently: Take 600 mg by mouth daily as needed. ), Disp: 90 tablet, Rfl: 1    levocetirizine (XYZAL) 5 MG tablet, Take 1 tablet (5 mg total) by mouth every evening., Disp: 30 tablet, Rfl: 11    montelukast (SINGULAIR) 10 mg tablet, TAKE 1 TABLET BY MOUTH AT BEDTIME, Disp: 90 tablet, Rfl: 0    norelgestromin-ethinyl estradiol (ORTHO EVRA) 150-35 mcg/24 hr, Place 1 patch onto the skin every 7 days., Disp: 4  patch, Rfl: 11    triamcinolone acetonide 0.1% (KENALOG) 0.1 % cream, Apply topically 2 (two) times daily., Disp: 454 g, Rfl: 0    Review of patient's allergies indicates:   Allergen Reactions    Nickel Rash       Family History   Problem Relation Age of Onset    Breast cancer Neg Hx     Colon cancer Neg Hx     Ovarian cancer Neg Hx        Social History     Socioeconomic History    Marital status: Single     Spouse name: Not on file    Number of children: Not on file    Years of education: Not on file    Highest education level: Not on file   Occupational History    Not on file   Social Needs    Financial resource strain: Not on file    Food insecurity     Worry: Not on file     Inability: Not on file    Transportation needs     Medical: Not on file     Non-medical: Not on file   Tobacco Use    Smoking status: Current Every Day Smoker     Packs/day: 0.25     Years: 10.00     Pack years: 2.50     Types: Cigarettes    Smokeless tobacco: Never Used    Tobacco comment: black and milds   Substance and Sexual Activity    Alcohol use: Yes     Alcohol/week: 1.0 standard drinks     Types: 1 Cans of beer per week     Comment: socially    Drug use: No    Sexual activity: Yes     Partners: Male     Birth control/protection: None, Condom   Lifestyle    Physical activity     Days per week: Not on file     Minutes per session: Not on file    Stress: Not on file   Relationships    Social connections     Talks on phone: Not on file     Gets together: Not on file     Attends [employer] service: Not on file     Active member of club or organization: Not on file     Attends meetings of clubs or organizations: Not on file     Relationship status: Not on file   Other Topics Concern    Not on file   Social History Narrative    Not on file       Review of Systems -  Constitutional: Denies having night sweats, constant fatigue, loss of appetite or recent substantial weight loss.  Eyes: Denies blurred vision or double  vision.  Respiratory: Denies symptoms of shortness of breath, noisy breathing, hoarseness or chronic cough.  GI: Denies symptoms of heartburn, acid regurgitation, or the known presence of a hiatal hernia.  The remainder of a 10-point review of systems is negative    REVIEW OF RADIOLOGICAL FILMS AND RECORDS (PERSONALLY REVIEWED):  U/S reviewed - 1.6cm spongiform nodule, no microcalcifications    REVIEW OF LABS (PERSONALLY REVIEWED)  No recent TFTs.    PHYSICAL EXAM:  Vitals - LMP 11/18/2020 (Exact Date)   Constitutional -      General Appearance: well developed, well nourished, without obvious deformities     Communication: speaks with a normal voice without hoarseness  Head & Face -     Overall: no obvious scars, lesions or masses     Parotid and submandibular glands: no masses or tenderness     Facial strength: normal and equal bilaterally  Eyes -      EOM intact  Ear, Nose, Mouth & Throat -     Ears: both left and right external auditory canals and TM's are normal, no external deformities     Nasal exam: mucosa is pink, septum is midline, visible turbinates are normal on anterior rhinoscopy     Mastication: teeth appear in good repair     Oral Cavity and oropharynx: mucosa, hard and soft palates, tongue, posterior pharyngeal wall, lips and gums are without lesions. Tonsils appear 1+  Respiratory:     Breathing unlabored, no stridor  Cardiovascular:     No JVD, capillary refill normal  Larynx: using the mirror for indirect laryngoscopy, the epiglottic, false cords, true cords, and pyriform sinuses are without lesions and the true vocal cords move normally  Neck: appears symmetric, and on palpation is without masses   Endocrine:     Thyroid: no asymmetry, thyromegaly, or thyroid nodules on palpation, though the R lobe feels slightly larger than the L.  Lymphatic:     No cervical lymphadenopathy  Cranial Nerves:      II: Pupillary reflexes normal     III, IV, VI: EOM normal     V: 1,2,3: normal sensation     VII:  Normal strength in all divisions     IX, X: Normal voice, palatal elevation and sensation     XI: Shoulder strength normal       XII: Tongue mobility normal  Psychiatric:     Appropriate affect    ASSESSMENT: 1.6cm spongiform nodule    PLAN: We discussed current RIVAS guidelines for spongiform nodules with recommend either repeat U/S for surveillance or FNA. She would like to repeat the U/S in 6 months so I will schedule this. Should this nodule become more worrisome to her, or otherwise to change, we can always schedule here here for U/S-guided FNA with my partner Dr Arredondo.       Layo Heath

## 2021-01-05 ENCOUNTER — PATIENT MESSAGE (OUTPATIENT)
Dept: ADMINISTRATIVE | Facility: HOSPITAL | Age: 36
End: 2021-01-05

## 2021-01-15 ENCOUNTER — CLINICAL SUPPORT (OUTPATIENT)
Dept: URGENT CARE | Facility: CLINIC | Age: 36
End: 2021-01-15
Payer: COMMERCIAL

## 2021-01-15 VITALS — OXYGEN SATURATION: 100 % | HEART RATE: 82 BPM | RESPIRATION RATE: 16 BRPM | TEMPERATURE: 99 F

## 2021-01-15 DIAGNOSIS — Z11.59 ENCOUNTER FOR SCREENING FOR OTHER VIRAL DISEASES: Primary | ICD-10-CM

## 2021-01-15 LAB
CTP QC/QA: YES
SARS-COV-2 RDRP RESP QL NAA+PROBE: NEGATIVE

## 2021-01-15 PROCEDURE — U0002: ICD-10-PCS | Mod: QW,S$GLB,, | Performed by: STUDENT IN AN ORGANIZED HEALTH CARE EDUCATION/TRAINING PROGRAM

## 2021-01-15 PROCEDURE — U0002 COVID-19 LAB TEST NON-CDC: HCPCS | Mod: QW,S$GLB,, | Performed by: STUDENT IN AN ORGANIZED HEALTH CARE EDUCATION/TRAINING PROGRAM

## 2021-03-01 ENCOUNTER — OFFICE VISIT (OUTPATIENT)
Dept: OBSTETRICS AND GYNECOLOGY | Facility: CLINIC | Age: 36
End: 2021-03-01
Payer: COMMERCIAL

## 2021-03-01 VITALS — HEIGHT: 69 IN | BODY MASS INDEX: 25.47 KG/M2 | WEIGHT: 171.94 LBS

## 2021-03-01 DIAGNOSIS — H36.89 SICKLE CELL RETINOPATHY WITHOUT CRISIS: ICD-10-CM

## 2021-03-01 DIAGNOSIS — Z01.419 ENCOUNTER FOR GYNECOLOGICAL EXAMINATION WITHOUT ABNORMAL FINDING: Primary | ICD-10-CM

## 2021-03-01 DIAGNOSIS — L43.9 LICHEN PLANUS: ICD-10-CM

## 2021-03-01 DIAGNOSIS — Z12.31 VISIT FOR SCREENING MAMMOGRAM: ICD-10-CM

## 2021-03-01 DIAGNOSIS — D57.20 SICKLE CELL-HEMOGLOBIN C DISEASE WITHOUT CRISIS: ICD-10-CM

## 2021-03-01 DIAGNOSIS — D57.1 SICKLE CELL RETINOPATHY WITHOUT CRISIS: ICD-10-CM

## 2021-03-01 DIAGNOSIS — A60.00 GENITAL HERPES SIMPLEX, UNSPECIFIED SITE: ICD-10-CM

## 2021-03-01 PROCEDURE — 3008F PR BODY MASS INDEX (BMI) DOCUMENTED: ICD-10-PCS | Mod: CPTII,S$GLB,, | Performed by: OBSTETRICS & GYNECOLOGY

## 2021-03-01 PROCEDURE — 1126F PR PAIN SEVERITY QUANTIFIED, NO PAIN PRESENT: ICD-10-PCS | Mod: S$GLB,,, | Performed by: OBSTETRICS & GYNECOLOGY

## 2021-03-01 PROCEDURE — 99395 PR PREVENTIVE VISIT,EST,18-39: ICD-10-PCS | Mod: S$GLB,,, | Performed by: OBSTETRICS & GYNECOLOGY

## 2021-03-01 PROCEDURE — 99999 PR PBB SHADOW E&M-EST. PATIENT-LVL III: ICD-10-PCS | Mod: PBBFAC,,, | Performed by: OBSTETRICS & GYNECOLOGY

## 2021-03-01 PROCEDURE — 88175 CYTOPATH C/V AUTO FLUID REDO: CPT

## 2021-03-01 PROCEDURE — 99999 PR PBB SHADOW E&M-EST. PATIENT-LVL III: CPT | Mod: PBBFAC,,, | Performed by: OBSTETRICS & GYNECOLOGY

## 2021-03-01 PROCEDURE — 1126F AMNT PAIN NOTED NONE PRSNT: CPT | Mod: S$GLB,,, | Performed by: OBSTETRICS & GYNECOLOGY

## 2021-03-01 PROCEDURE — 3008F BODY MASS INDEX DOCD: CPT | Mod: CPTII,S$GLB,, | Performed by: OBSTETRICS & GYNECOLOGY

## 2021-03-01 PROCEDURE — 99395 PREV VISIT EST AGE 18-39: CPT | Mod: S$GLB,,, | Performed by: OBSTETRICS & GYNECOLOGY

## 2021-03-01 PROCEDURE — 87624 HPV HI-RISK TYP POOLED RSLT: CPT

## 2021-03-01 RX ORDER — VALACYCLOVIR HYDROCHLORIDE 1 G/1
2000 TABLET, FILM COATED ORAL EVERY 12 HOURS
Qty: 30 TABLET | Status: SHIPPED | OUTPATIENT
Start: 2021-03-01 | End: 2023-03-24 | Stop reason: SDUPTHER

## 2021-03-17 LAB
CLINICAL INFO: NORMAL
CYTO CVX: NORMAL
CYTOLOGIST CVX/VAG CYTO: NORMAL
CYTOLOGY CMNT CVX/VAG CYTO-IMP: NORMAL
CYTOLOGY PAP THIN PREP EXPLANATION: NORMAL
DATE OF PREVIOUS PAP: NORMAL
DATE PREVIOUS BX: NO
HPV I/H RISK 4 DNA CVX QL NAA+PROBE: NOT DETECTED
LMP START DATE: NORMAL
MICROORGANISM CVX/VAG CYTO: NORMAL
SPECIMEN SOURCE CVX/VAG CYTO: NORMAL
STAT OF ADQ CVX/VAG CYTO-IMP: NORMAL

## 2021-03-31 ENCOUNTER — PATIENT MESSAGE (OUTPATIENT)
Dept: OBSTETRICS AND GYNECOLOGY | Facility: CLINIC | Age: 36
End: 2021-03-31

## 2021-03-31 DIAGNOSIS — N76.0 BV (BACTERIAL VAGINOSIS): Primary | ICD-10-CM

## 2021-03-31 DIAGNOSIS — B96.89 BV (BACTERIAL VAGINOSIS): Primary | ICD-10-CM

## 2021-03-31 RX ORDER — METRONIDAZOLE 7.5 MG/G
1 GEL VAGINAL DAILY
Qty: 1 G | Refills: 0 | OUTPATIENT
Start: 2021-03-31 | End: 2021-04-07

## 2021-03-31 RX ORDER — METRONIDAZOLE 7.5 MG/G
1 GEL VAGINAL NIGHTLY
Qty: 1 G | Refills: 0 | Status: SHIPPED | OUTPATIENT
Start: 2021-03-31 | End: 2021-04-07

## 2021-04-27 ENCOUNTER — CLINICAL SUPPORT (OUTPATIENT)
Dept: URGENT CARE | Facility: CLINIC | Age: 36
End: 2021-04-27
Payer: COMMERCIAL

## 2021-04-27 DIAGNOSIS — Z11.59 ENCOUNTER FOR SCREENING FOR OTHER VIRAL DISEASES: Primary | ICD-10-CM

## 2021-04-27 LAB
CTP QC/QA: YES
SARS-COV-2 RDRP RESP QL NAA+PROBE: NEGATIVE

## 2021-04-27 PROCEDURE — U0002 COVID-19 LAB TEST NON-CDC: HCPCS | Mod: QW,S$GLB,, | Performed by: NURSE PRACTITIONER

## 2021-04-27 PROCEDURE — U0002: ICD-10-PCS | Mod: QW,S$GLB,, | Performed by: NURSE PRACTITIONER

## 2021-04-27 PROCEDURE — 99211 OFF/OP EST MAY X REQ PHY/QHP: CPT | Mod: S$GLB,,, | Performed by: NURSE PRACTITIONER

## 2021-04-27 PROCEDURE — 99211 PR OFFICE/OUTPT VISIT, EST, LEVL I: ICD-10-PCS | Mod: S$GLB,,, | Performed by: NURSE PRACTITIONER

## 2021-05-07 ENCOUNTER — TELEPHONE (OUTPATIENT)
Dept: OPHTHALMOLOGY | Facility: CLINIC | Age: 36
End: 2021-05-07

## 2021-05-17 ENCOUNTER — OFFICE VISIT (OUTPATIENT)
Dept: OPTOMETRY | Facility: CLINIC | Age: 36
End: 2021-05-17
Payer: COMMERCIAL

## 2021-05-17 DIAGNOSIS — H35.9 RETINAL DISORDER, BILATERAL: ICD-10-CM

## 2021-05-17 DIAGNOSIS — D57.1 HB-SS DISEASE WITHOUT CRISIS: ICD-10-CM

## 2021-05-17 DIAGNOSIS — Z46.0 ENCOUNTER FOR FITTING OR ADJUSTMENT OF SPECTACLES OR CONTACT LENSES: Primary | ICD-10-CM

## 2021-05-17 DIAGNOSIS — Z01.00 EXAMINATION OF EYES AND VISION: Primary | ICD-10-CM

## 2021-05-17 DIAGNOSIS — H52.13 MYOPIA OF BOTH EYES: ICD-10-CM

## 2021-05-17 PROCEDURE — 92014 PR EYE EXAM, EST PATIENT,COMPREHESV: ICD-10-PCS | Mod: S$GLB,,, | Performed by: OPTOMETRIST

## 2021-05-17 PROCEDURE — 92310 PR CONTACT LENS FITTING (NO CHANGE): ICD-10-PCS | Mod: S$GLB,,, | Performed by: OPTOMETRIST

## 2021-05-17 PROCEDURE — 92310 CONTACT LENS FITTING OU: CPT | Mod: S$GLB,,, | Performed by: OPTOMETRIST

## 2021-05-17 PROCEDURE — 99999 PR PBB SHADOW E&M-EST. PATIENT-LVL III: ICD-10-PCS | Mod: PBBFAC,,, | Performed by: OPTOMETRIST

## 2021-05-17 PROCEDURE — 92015 DETERMINE REFRACTIVE STATE: CPT | Mod: S$GLB,,, | Performed by: OPTOMETRIST

## 2021-05-17 PROCEDURE — 99999 PR PBB SHADOW E&M-EST. PATIENT-LVL III: CPT | Mod: PBBFAC,,, | Performed by: OPTOMETRIST

## 2021-05-17 PROCEDURE — 92014 COMPRE OPH EXAM EST PT 1/>: CPT | Mod: S$GLB,,, | Performed by: OPTOMETRIST

## 2021-05-17 PROCEDURE — 92015 PR REFRACTION: ICD-10-PCS | Mod: S$GLB,,, | Performed by: OPTOMETRIST

## 2021-05-17 PROCEDURE — 99499 NO LOS: ICD-10-PCS | Mod: S$GLB,,, | Performed by: OPTOMETRIST

## 2021-05-17 PROCEDURE — 99499 UNLISTED E&M SERVICE: CPT | Mod: S$GLB,,, | Performed by: OPTOMETRIST

## 2021-06-27 DIAGNOSIS — J30.1 SEASONAL ALLERGIC RHINITIS DUE TO POLLEN: ICD-10-CM

## 2021-06-29 RX ORDER — LEVOCETIRIZINE DIHYDROCHLORIDE 5 MG/1
TABLET, FILM COATED ORAL
Qty: 90 TABLET | Refills: 1 | Status: SHIPPED | OUTPATIENT
Start: 2021-06-29 | End: 2022-06-22

## 2021-07-22 ENCOUNTER — HOSPITAL ENCOUNTER (OUTPATIENT)
Dept: RADIOLOGY | Facility: OTHER | Age: 36
Discharge: HOME | End: 2021-07-22
Attending: OTOLARYNGOLOGY
Payer: COMMERCIAL

## 2021-07-22 DIAGNOSIS — E04.1 THYROID NODULE: ICD-10-CM

## 2021-07-22 PROCEDURE — 76536 US SOFT TISSUE HEAD NECK THYROID: ICD-10-PCS | Mod: 26,,, | Performed by: RADIOLOGY

## 2021-07-22 PROCEDURE — 76536 US EXAM OF HEAD AND NECK: CPT | Mod: TC

## 2021-07-22 PROCEDURE — 76536 US EXAM OF HEAD AND NECK: CPT | Mod: 26,,, | Performed by: RADIOLOGY

## 2021-08-02 ENCOUNTER — OFFICE VISIT (OUTPATIENT)
Dept: URGENT CARE | Facility: CLINIC | Age: 36
End: 2021-08-02
Payer: COMMERCIAL

## 2021-08-02 VITALS
HEART RATE: 88 BPM | SYSTOLIC BLOOD PRESSURE: 136 MMHG | DIASTOLIC BLOOD PRESSURE: 73 MMHG | RESPIRATION RATE: 17 BRPM | HEIGHT: 69 IN | TEMPERATURE: 98 F | OXYGEN SATURATION: 97 % | BODY MASS INDEX: 24.44 KG/M2 | WEIGHT: 165 LBS

## 2021-08-02 DIAGNOSIS — J02.9 SORE THROAT: Primary | ICD-10-CM

## 2021-08-02 DIAGNOSIS — H66.90 OTITIS MEDIA, UNSPECIFIED LATERALITY, UNSPECIFIED OTITIS MEDIA TYPE: ICD-10-CM

## 2021-08-02 DIAGNOSIS — J32.1 FRONTAL SINUSITIS, UNSPECIFIED CHRONICITY: ICD-10-CM

## 2021-08-02 LAB
CTP QC/QA: YES
SARS-COV-2 RDRP RESP QL NAA+PROBE: NEGATIVE

## 2021-08-02 PROCEDURE — U0002 COVID-19 LAB TEST NON-CDC: HCPCS | Mod: QW,S$GLB,, | Performed by: NURSE PRACTITIONER

## 2021-08-02 PROCEDURE — 3078F DIAST BP <80 MM HG: CPT | Mod: CPTII,S$GLB,, | Performed by: NURSE PRACTITIONER

## 2021-08-02 PROCEDURE — 1159F MED LIST DOCD IN RCRD: CPT | Mod: CPTII,S$GLB,, | Performed by: NURSE PRACTITIONER

## 2021-08-02 PROCEDURE — 3008F PR BODY MASS INDEX (BMI) DOCUMENTED: ICD-10-PCS | Mod: CPTII,S$GLB,, | Performed by: NURSE PRACTITIONER

## 2021-08-02 PROCEDURE — 1160F RVW MEDS BY RX/DR IN RCRD: CPT | Mod: CPTII,S$GLB,, | Performed by: NURSE PRACTITIONER

## 2021-08-02 PROCEDURE — 1159F PR MEDICATION LIST DOCUMENTED IN MEDICAL RECORD: ICD-10-PCS | Mod: CPTII,S$GLB,, | Performed by: NURSE PRACTITIONER

## 2021-08-02 PROCEDURE — 3008F BODY MASS INDEX DOCD: CPT | Mod: CPTII,S$GLB,, | Performed by: NURSE PRACTITIONER

## 2021-08-02 PROCEDURE — 3075F SYST BP GE 130 - 139MM HG: CPT | Mod: CPTII,S$GLB,, | Performed by: NURSE PRACTITIONER

## 2021-08-02 PROCEDURE — 99214 PR OFFICE/OUTPT VISIT, EST, LEVL IV, 30-39 MIN: ICD-10-PCS | Mod: S$GLB,CS,, | Performed by: NURSE PRACTITIONER

## 2021-08-02 PROCEDURE — 3078F PR MOST RECENT DIASTOLIC BLOOD PRESSURE < 80 MM HG: ICD-10-PCS | Mod: CPTII,S$GLB,, | Performed by: NURSE PRACTITIONER

## 2021-08-02 PROCEDURE — 99214 OFFICE O/P EST MOD 30 MIN: CPT | Mod: S$GLB,CS,, | Performed by: NURSE PRACTITIONER

## 2021-08-02 PROCEDURE — U0002: ICD-10-PCS | Mod: QW,S$GLB,, | Performed by: NURSE PRACTITIONER

## 2021-08-02 PROCEDURE — 3075F PR MOST RECENT SYSTOLIC BLOOD PRESS GE 130-139MM HG: ICD-10-PCS | Mod: CPTII,S$GLB,, | Performed by: NURSE PRACTITIONER

## 2021-08-02 PROCEDURE — 1160F PR REVIEW ALL MEDS BY PRESCRIBER/CLIN PHARMACIST DOCUMENTED: ICD-10-PCS | Mod: CPTII,S$GLB,, | Performed by: NURSE PRACTITIONER

## 2021-08-02 RX ORDER — AMOXICILLIN 875 MG/1
875 TABLET, FILM COATED ORAL 2 TIMES DAILY
Qty: 20 TABLET | Refills: 0 | Status: SHIPPED | OUTPATIENT
Start: 2021-08-02 | End: 2021-08-12

## 2021-08-02 RX ORDER — FLUCONAZOLE 150 MG/1
150 TABLET ORAL DAILY
Qty: 1 TABLET | Refills: 1 | Status: SHIPPED | OUTPATIENT
Start: 2021-08-02 | End: 2021-08-03

## 2021-08-10 ENCOUNTER — PATIENT MESSAGE (OUTPATIENT)
Dept: OTOLARYNGOLOGY | Facility: CLINIC | Age: 36
End: 2021-08-10

## 2021-09-20 ENCOUNTER — IMMUNIZATION (OUTPATIENT)
Dept: INTERNAL MEDICINE | Facility: CLINIC | Age: 36
End: 2021-09-20
Payer: COMMERCIAL

## 2021-09-20 DIAGNOSIS — Z23 NEED FOR VACCINATION: Primary | ICD-10-CM

## 2021-09-20 PROCEDURE — 91300 COVID-19, MRNA, LNP-S, PF, 30 MCG/0.3 ML DOSE VACCINE: CPT | Mod: PBBFAC | Performed by: INTERNAL MEDICINE

## 2021-10-11 ENCOUNTER — IMMUNIZATION (OUTPATIENT)
Dept: INTERNAL MEDICINE | Facility: CLINIC | Age: 36
End: 2021-10-11
Payer: COMMERCIAL

## 2021-10-11 DIAGNOSIS — Z23 NEED FOR VACCINATION: Primary | ICD-10-CM

## 2021-10-11 PROCEDURE — 0002A COVID-19, MRNA, LNP-S, PF, 30 MCG/0.3 ML DOSE VACCINE: CPT | Mod: PBBFAC | Performed by: INTERNAL MEDICINE

## 2021-10-11 PROCEDURE — 91300 COVID-19, MRNA, LNP-S, PF, 30 MCG/0.3 ML DOSE VACCINE: CPT | Mod: PBBFAC | Performed by: INTERNAL MEDICINE

## 2021-10-21 DIAGNOSIS — J30.1 SEASONAL ALLERGIC RHINITIS DUE TO POLLEN: ICD-10-CM

## 2021-10-21 RX ORDER — MONTELUKAST SODIUM 10 MG/1
10 TABLET ORAL NIGHTLY
Qty: 90 TABLET | Refills: 0 | Status: SHIPPED | OUTPATIENT
Start: 2021-10-21 | End: 2022-01-13

## 2021-10-27 ENCOUNTER — OFFICE VISIT (OUTPATIENT)
Dept: PRIMARY CARE CLINIC | Facility: CLINIC | Age: 36
End: 2021-10-27
Payer: COMMERCIAL

## 2021-10-27 ENCOUNTER — TELEPHONE (OUTPATIENT)
Dept: PRIMARY CARE CLINIC | Facility: CLINIC | Age: 36
End: 2021-10-27
Payer: COMMERCIAL

## 2021-10-27 DIAGNOSIS — H65.195 OTHER RECURRENT ACUTE NONSUPPURATIVE OTITIS MEDIA OF LEFT EAR: Primary | ICD-10-CM

## 2021-10-27 PROCEDURE — 1159F PR MEDICATION LIST DOCUMENTED IN MEDICAL RECORD: ICD-10-PCS | Mod: CPTII,95,, | Performed by: STUDENT IN AN ORGANIZED HEALTH CARE EDUCATION/TRAINING PROGRAM

## 2021-10-27 PROCEDURE — 1160F PR REVIEW ALL MEDS BY PRESCRIBER/CLIN PHARMACIST DOCUMENTED: ICD-10-PCS | Mod: CPTII,95,, | Performed by: STUDENT IN AN ORGANIZED HEALTH CARE EDUCATION/TRAINING PROGRAM

## 2021-10-27 PROCEDURE — 99214 PR OFFICE/OUTPT VISIT, EST, LEVL IV, 30-39 MIN: ICD-10-PCS | Mod: 95,,, | Performed by: STUDENT IN AN ORGANIZED HEALTH CARE EDUCATION/TRAINING PROGRAM

## 2021-10-27 PROCEDURE — 1159F MED LIST DOCD IN RCRD: CPT | Mod: CPTII,95,, | Performed by: STUDENT IN AN ORGANIZED HEALTH CARE EDUCATION/TRAINING PROGRAM

## 2021-10-27 PROCEDURE — 99214 OFFICE O/P EST MOD 30 MIN: CPT | Mod: 95,,, | Performed by: STUDENT IN AN ORGANIZED HEALTH CARE EDUCATION/TRAINING PROGRAM

## 2021-10-27 PROCEDURE — 1160F RVW MEDS BY RX/DR IN RCRD: CPT | Mod: CPTII,95,, | Performed by: STUDENT IN AN ORGANIZED HEALTH CARE EDUCATION/TRAINING PROGRAM

## 2021-10-27 RX ORDER — AZITHROMYCIN 250 MG/1
TABLET, FILM COATED ORAL
Qty: 6 TABLET | Refills: 0 | Status: SHIPPED | OUTPATIENT
Start: 2021-10-27 | End: 2021-11-01

## 2021-11-06 ENCOUNTER — OFFICE VISIT (OUTPATIENT)
Dept: URGENT CARE | Facility: CLINIC | Age: 36
End: 2021-11-06
Payer: COMMERCIAL

## 2021-11-06 VITALS
SYSTOLIC BLOOD PRESSURE: 113 MMHG | TEMPERATURE: 98 F | HEART RATE: 79 BPM | BODY MASS INDEX: 24.44 KG/M2 | DIASTOLIC BLOOD PRESSURE: 73 MMHG | HEIGHT: 69 IN | OXYGEN SATURATION: 99 % | RESPIRATION RATE: 16 BRPM | WEIGHT: 165 LBS

## 2021-11-06 DIAGNOSIS — Z11.59 ENCOUNTER FOR SCREENING FOR OTHER VIRAL DISEASES: ICD-10-CM

## 2021-11-06 DIAGNOSIS — H66.002 ACUTE SUPPURATIVE OTITIS MEDIA OF LEFT EAR WITHOUT SPONTANEOUS RUPTURE OF TYMPANIC MEMBRANE, RECURRENCE NOT SPECIFIED: Primary | ICD-10-CM

## 2021-11-06 DIAGNOSIS — J30.9 ALLERGIC RHINITIS, UNSPECIFIED SEASONALITY, UNSPECIFIED TRIGGER: ICD-10-CM

## 2021-11-06 LAB
CTP QC/QA: YES
SARS-COV-2 RDRP RESP QL NAA+PROBE: NEGATIVE

## 2021-11-06 PROCEDURE — 1159F PR MEDICATION LIST DOCUMENTED IN MEDICAL RECORD: ICD-10-PCS | Mod: CPTII,S$GLB,, | Performed by: NURSE PRACTITIONER

## 2021-11-06 PROCEDURE — 99214 PR OFFICE/OUTPT VISIT, EST, LEVL IV, 30-39 MIN: ICD-10-PCS | Mod: S$GLB,,, | Performed by: NURSE PRACTITIONER

## 2021-11-06 PROCEDURE — U0002 COVID-19 LAB TEST NON-CDC: HCPCS | Mod: QW,S$GLB,, | Performed by: NURSE PRACTITIONER

## 2021-11-06 PROCEDURE — 1160F RVW MEDS BY RX/DR IN RCRD: CPT | Mod: CPTII,S$GLB,, | Performed by: NURSE PRACTITIONER

## 2021-11-06 PROCEDURE — 3078F DIAST BP <80 MM HG: CPT | Mod: CPTII,S$GLB,, | Performed by: NURSE PRACTITIONER

## 2021-11-06 PROCEDURE — 3078F PR MOST RECENT DIASTOLIC BLOOD PRESSURE < 80 MM HG: ICD-10-PCS | Mod: CPTII,S$GLB,, | Performed by: NURSE PRACTITIONER

## 2021-11-06 PROCEDURE — U0002: ICD-10-PCS | Mod: QW,S$GLB,, | Performed by: NURSE PRACTITIONER

## 2021-11-06 PROCEDURE — 3074F SYST BP LT 130 MM HG: CPT | Mod: CPTII,S$GLB,, | Performed by: NURSE PRACTITIONER

## 2021-11-06 PROCEDURE — 99214 OFFICE O/P EST MOD 30 MIN: CPT | Mod: S$GLB,,, | Performed by: NURSE PRACTITIONER

## 2021-11-06 PROCEDURE — 3074F PR MOST RECENT SYSTOLIC BLOOD PRESSURE < 130 MM HG: ICD-10-PCS | Mod: CPTII,S$GLB,, | Performed by: NURSE PRACTITIONER

## 2021-11-06 PROCEDURE — 1159F MED LIST DOCD IN RCRD: CPT | Mod: CPTII,S$GLB,, | Performed by: NURSE PRACTITIONER

## 2021-11-06 PROCEDURE — 3008F BODY MASS INDEX DOCD: CPT | Mod: CPTII,S$GLB,, | Performed by: NURSE PRACTITIONER

## 2021-11-06 PROCEDURE — 3008F PR BODY MASS INDEX (BMI) DOCUMENTED: ICD-10-PCS | Mod: CPTII,S$GLB,, | Performed by: NURSE PRACTITIONER

## 2021-11-06 PROCEDURE — 1160F PR REVIEW ALL MEDS BY PRESCRIBER/CLIN PHARMACIST DOCUMENTED: ICD-10-PCS | Mod: CPTII,S$GLB,, | Performed by: NURSE PRACTITIONER

## 2021-11-06 RX ORDER — AMOXICILLIN AND CLAVULANATE POTASSIUM 875; 125 MG/1; MG/1
1 TABLET, FILM COATED ORAL 2 TIMES DAILY
Qty: 14 TABLET | Refills: 0 | Status: SHIPPED | OUTPATIENT
Start: 2021-11-06 | End: 2021-11-13

## 2021-11-06 RX ORDER — AZELASTINE 1 MG/ML
1 SPRAY, METERED NASAL 2 TIMES DAILY
Qty: 30 ML | Refills: 1 | Status: SHIPPED | OUTPATIENT
Start: 2021-11-06 | End: 2022-12-02 | Stop reason: SDUPTHER

## 2021-11-08 ENCOUNTER — TELEPHONE (OUTPATIENT)
Dept: OTOLARYNGOLOGY | Facility: CLINIC | Age: 36
End: 2021-11-08
Payer: COMMERCIAL

## 2022-02-21 ENCOUNTER — OFFICE VISIT (OUTPATIENT)
Dept: URGENT CARE | Facility: CLINIC | Age: 37
End: 2022-02-21
Payer: COMMERCIAL

## 2022-02-21 VITALS
RESPIRATION RATE: 18 BRPM | BODY MASS INDEX: 24.44 KG/M2 | WEIGHT: 165 LBS | DIASTOLIC BLOOD PRESSURE: 66 MMHG | SYSTOLIC BLOOD PRESSURE: 118 MMHG | HEIGHT: 69 IN | TEMPERATURE: 98 F | HEART RATE: 90 BPM | OXYGEN SATURATION: 100 %

## 2022-02-21 DIAGNOSIS — R07.89 CHEST TIGHTNESS: ICD-10-CM

## 2022-02-21 DIAGNOSIS — R50.9 FEVER, UNSPECIFIED FEVER CAUSE: Primary | ICD-10-CM

## 2022-02-21 DIAGNOSIS — R10.9 ABDOMINAL PAIN, UNSPECIFIED ABDOMINAL LOCATION: ICD-10-CM

## 2022-02-21 LAB
B-HCG UR QL: NEGATIVE
BILIRUB UR QL STRIP: NEGATIVE
CTP QC/QA: YES
GLUCOSE UR QL STRIP: NEGATIVE
KETONES UR QL STRIP: NEGATIVE
LEUKOCYTE ESTERASE UR QL STRIP: NEGATIVE
PH, POC UA: 5.5 (ref 5–8)
POC BLOOD, URINE: POSITIVE
POC MOLECULAR INFLUENZA A AGN: NEGATIVE
POC MOLECULAR INFLUENZA B AGN: NEGATIVE
POC NITRATES, URINE: NEGATIVE
PROT UR QL STRIP: NEGATIVE
SARS-COV-2 RDRP RESP QL NAA+PROBE: NEGATIVE
SP GR UR STRIP: 1.02 (ref 1–1.03)
UROBILINOGEN UR STRIP-ACNC: POSITIVE (ref 0.1–1.1)

## 2022-02-21 PROCEDURE — 81003 POCT URINALYSIS, DIPSTICK, AUTOMATED, W/O SCOPE: ICD-10-PCS | Mod: QW,S$GLB,, | Performed by: FAMILY MEDICINE

## 2022-02-21 PROCEDURE — 1160F PR REVIEW ALL MEDS BY PRESCRIBER/CLIN PHARMACIST DOCUMENTED: ICD-10-PCS | Mod: CPTII,S$GLB,, | Performed by: FAMILY MEDICINE

## 2022-02-21 PROCEDURE — 3074F SYST BP LT 130 MM HG: CPT | Mod: CPTII,S$GLB,, | Performed by: FAMILY MEDICINE

## 2022-02-21 PROCEDURE — 87502 INFLUENZA DNA AMP PROBE: CPT | Mod: QW,S$GLB,, | Performed by: FAMILY MEDICINE

## 2022-02-21 PROCEDURE — 99214 OFFICE O/P EST MOD 30 MIN: CPT | Mod: S$GLB,,, | Performed by: FAMILY MEDICINE

## 2022-02-21 PROCEDURE — 81003 URINALYSIS AUTO W/O SCOPE: CPT | Mod: QW,S$GLB,, | Performed by: FAMILY MEDICINE

## 2022-02-21 PROCEDURE — 1159F PR MEDICATION LIST DOCUMENTED IN MEDICAL RECORD: ICD-10-PCS | Mod: CPTII,S$GLB,, | Performed by: FAMILY MEDICINE

## 2022-02-21 PROCEDURE — 3078F DIAST BP <80 MM HG: CPT | Mod: CPTII,S$GLB,, | Performed by: FAMILY MEDICINE

## 2022-02-21 PROCEDURE — 3074F PR MOST RECENT SYSTOLIC BLOOD PRESSURE < 130 MM HG: ICD-10-PCS | Mod: CPTII,S$GLB,, | Performed by: FAMILY MEDICINE

## 2022-02-21 PROCEDURE — 1160F RVW MEDS BY RX/DR IN RCRD: CPT | Mod: CPTII,S$GLB,, | Performed by: FAMILY MEDICINE

## 2022-02-21 PROCEDURE — 1159F MED LIST DOCD IN RCRD: CPT | Mod: CPTII,S$GLB,, | Performed by: FAMILY MEDICINE

## 2022-02-21 PROCEDURE — 99214 PR OFFICE/OUTPT VISIT, EST, LEVL IV, 30-39 MIN: ICD-10-PCS | Mod: S$GLB,,, | Performed by: FAMILY MEDICINE

## 2022-02-21 PROCEDURE — 3078F PR MOST RECENT DIASTOLIC BLOOD PRESSURE < 80 MM HG: ICD-10-PCS | Mod: CPTII,S$GLB,, | Performed by: FAMILY MEDICINE

## 2022-02-21 PROCEDURE — U0002 COVID-19 LAB TEST NON-CDC: HCPCS | Mod: QW,S$GLB,, | Performed by: FAMILY MEDICINE

## 2022-02-21 PROCEDURE — 3008F PR BODY MASS INDEX (BMI) DOCUMENTED: ICD-10-PCS | Mod: CPTII,S$GLB,, | Performed by: FAMILY MEDICINE

## 2022-02-21 PROCEDURE — U0002: ICD-10-PCS | Mod: QW,S$GLB,, | Performed by: FAMILY MEDICINE

## 2022-02-21 PROCEDURE — 87086 URINE CULTURE/COLONY COUNT: CPT | Performed by: FAMILY MEDICINE

## 2022-02-21 PROCEDURE — 81025 POCT URINE PREGNANCY: ICD-10-PCS | Mod: S$GLB,,, | Performed by: FAMILY MEDICINE

## 2022-02-21 PROCEDURE — 87502 POCT INFLUENZA A/B MOLECULAR: ICD-10-PCS | Mod: QW,S$GLB,, | Performed by: FAMILY MEDICINE

## 2022-02-21 PROCEDURE — 3008F BODY MASS INDEX DOCD: CPT | Mod: CPTII,S$GLB,, | Performed by: FAMILY MEDICINE

## 2022-02-21 PROCEDURE — 81025 URINE PREGNANCY TEST: CPT | Mod: S$GLB,,, | Performed by: FAMILY MEDICINE

## 2022-02-21 RX ORDER — MAG HYDROX/ALUMINUM HYD/SIMETH 200-200-20
30 SUSPENSION, ORAL (FINAL DOSE FORM) ORAL
Status: COMPLETED | OUTPATIENT
Start: 2022-02-21 | End: 2022-02-21

## 2022-02-21 RX ORDER — LIDOCAINE HYDROCHLORIDE 20 MG/ML
10 SOLUTION OROPHARYNGEAL
Status: COMPLETED | OUTPATIENT
Start: 2022-02-21 | End: 2022-02-21

## 2022-02-21 RX ADMIN — Medication 30 ML: at 01:02

## 2022-02-21 RX ADMIN — LIDOCAINE HYDROCHLORIDE 10 ML: 20 SOLUTION OROPHARYNGEAL at 01:02

## 2022-02-21 NOTE — LETTER
[address] [place] [address] ? [place], [address] ? Phone [phone] ? Fax [phone]           Return to Work/School    Patient: Gautam Sotomayor  YOB: 1985   Date: 02/21/2022      To Whom It May Concern:     Gautam Sotomayor was in contact with/seen in my office on 02/21/2022. COVID-19 is present in our communities across the state. Not all patients are eligible or appropriate to be tested. In this situation, your employee meets the following criteria:     Gautam Sotomayor has met the criteria for COVID-19 testing and has a NEGATIVE result. The employee can return to work 2/24/22     If you have any questions or concerns, or if I can be of further assistance, please do not hesitate to contact me.     Sincerely,      Corey Weir NP

## 2022-02-21 NOTE — PROGRESS NOTES
"Subjective:       Patient ID: Gautam Sotomayor is a 36 y.o. female.    Vitals:  height is 5' 9" (1.753 m) and weight is 74.8 kg (165 lb). Her temperature is 98 °F (36.7 °C). Her blood pressure is 118/66 and her pulse is 90. Her respiration is 18 and oxygen saturation is 100%.     Chief Complaint: Fever    Pt states 5 days ago had diarrhea for two days that resolved now. Stool currently formed, not bloody or black- still passing gas. 2 days ago had one episode of vomiting (nonbloody or black) and started with fatigue and body aches. Yesterday noticed tmax 99.7. also having ear pain and noticed intermittent mild nonexertional chest tightness yesterday too. No sob, cough. No dysuria, hematuria. Menstrual cycle last week. No hx of kidney stone. Tried pepto bismol. Able to keep down smoothies since vomiting. No recent travel, abx use (last 4 months ago), raw food consumption, sick contacts. Requesting flu swab.     Fever   This is a new problem. The current episode started in the past 7 days (x4 days). The problem occurs constantly. The problem has been gradually worsening. The maximum temperature noted was 99 to 99.9 F. The temperature was taken using an oral thermometer. Associated symptoms include abdominal pain (upper), diarrhea (resolved), ear pain, headaches, muscle aches, sleepiness and vomiting (resolved). Pertinent negatives include no congestion, coughing or sore throat. She has tried acetaminophen and NSAIDs for the symptoms. The treatment provided no relief.       Constitution: Positive for chills, sweating, fatigue and fever.   HENT: Positive for ear pain. Negative for congestion and sore throat.    Respiratory: Negative for cough.    Gastrointestinal: Positive for abdominal pain (upper), abdominal bloating, vomiting (resolved) and diarrhea (resolved).   Neurological: Positive for headaches.       Objective:      Physical Exam   Constitutional: She is oriented to person, place, and time. She appears " well-developed.   HENT:   Head: Normocephalic and atraumatic.   Ears:   Right Ear: External ear normal. Tympanic membrane is not erythematous. No middle ear effusion.   Left Ear: External ear normal. Tympanic membrane is not erythematous.  No middle ear effusion.   Nose: Nose normal.   Mouth/Throat: Oropharynx is clear and moist and mucous membranes are normal. No oropharyngeal exudate, posterior oropharyngeal edema, posterior oropharyngeal erythema, tonsillar abscesses or cobblestoning.   Eyes: Conjunctivae and lids are normal.   Neck: Trachea normal. Neck supple.   Cardiovascular: Normal rate, regular rhythm and normal heart sounds.   Pulmonary/Chest: Effort normal and breath sounds normal. No accessory muscle usage. No tachypnea. No respiratory distress. She has no decreased breath sounds. She has no wheezes. She has no rhonchi. She has no rales.   NAD able to speak in clear complete sentences without difficulty      Comments: NAD able to speak in clear complete sentences without difficulty      Abdominal: Normal appearance and bowel sounds are normal. She exhibits no distension, no abdominal bruit, no pulsatile midline mass and no mass. Soft. There is abdominal tenderness in the right lower quadrant. There is no rebound, no guarding, no left CVA tenderness and no right CVA tenderness.      Comments: Hx of appendectomy      Musculoskeletal: Normal range of motion.         General: Normal range of motion.   Neurological: She is alert and oriented to person, place, and time. She has normal strength.   Skin: Skin is warm, dry, intact, not diaphoretic and not pale.   Psychiatric: Her speech is normal and behavior is normal. Judgment and thought content normal.   Nursing note and vitals reviewed.    Results for orders placed or performed in visit on 02/21/22   POCT Urinalysis, Dipstick, Automated, W/O Scope   Result Value Ref Range    POC Blood, Urine Positive (A) Negative    POC Bilirubin, Urine Negative Negative     POC Urobilinogen, Urine Positive 0.1 - 1.1    POC Ketones, Urine Negative Negative    POC Protein, Urine Negative Negative    POC Nitrates, Urine Negative Negative    POC Glucose, Urine Negative Negative    pH, UA 5.5 5 - 8    POC Specific Gravity, Urine 1.020 1.003 - 1.029    POC Leukocytes, Urine Negative Negative   POCT urine pregnancy   Result Value Ref Range    POC Preg Test, Ur Negative Negative     Acceptable Yes    POCT COVID-19 Rapid Screening   Result Value Ref Range    POC Rapid COVID Negative Negative     Acceptable Yes    POCT Influenza A/B Molecular   Result Value Ref Range    POC Molecular Influenza A Ag Negative Negative, Not Reported    POC Molecular Influenza B Ag Negative Negative, Not Reported     Acceptable Yes      EKG: normal EKG, normal sinus rhythm, there are no previous tracings available for comparison. Rate of 69 bpm. No STEMI. No ectopy. T wave flat III, inverted v2.         Assessment:       1. Fever, unspecified fever cause    2. Abdominal bloating with cramps    3. Chest tightness          Plan:         Fever, unspecified fever cause  -     POCT COVID-19 Rapid Screening  -     POCT Influenza A/B Molecular    Abdominal bloating with cramps  -     POCT Urinalysis, Dipstick, Automated, W/O Scope  -     POCT urine pregnancy  -     aluminum-magnesium hydroxide-simethicone 200-200-20 mg/5 mL suspension 30 mL  -     LIDOcaine HCl 2% oral solution 10 mL    Chest tightness  -     EKG 12-lead  -     aluminum-magnesium hydroxide-simethicone 200-200-20 mg/5 mL suspension 30 mL  -     LIDOcaine HCl 2% oral solution 10 mL    re-evaluation after GI cocktail pt states chest tightness resolved. Has been intermittent, brief. Discussed ER precautions for any changing or worsening sx, she needs f/u with primary care for further evaluation of her sx. Placed urgent referral for her  Ddx: gerd, gastritis, PUD, cholecystitis, UTI, nephrolithiasis, pancreatitis- I  feel less likely diverticulitis, sbo, acs    Patient Instructions   PLEASE READ YOUR DISCHARGE INSTRUCTIONS ENTIRELY AS IT CONTAINS IMPORTANT INFORMATION.      Use gatorade/pedialyte or rehydration packets to help stay hydrated. Vitamin water and plain water do not contain rehydrating electrolytes.  Increase clear liquids (water, gatorade, pedialyte, broths, jello, etc) Hold off on solids for 12-18 hours. Then advance to BRAT diet (banana, rice, applesauce, tea, toast/crackers), then advance further as tolerated. Avoid spicy or fatty foods.   Use Peptobismol/TUMS/maalox to help alleviate your diarrhea/upset stomach symptoms.     Avoid imodium unless you have more than 6 loose stools in 24 hours.       Wash hands frequently while sick. Avoid ibuprofen or other NSAIDS until you are well.     Please go to the ER if you experience worsening abdominal pain, blood in your vomit or stool, high fever, dizziness, fainting, swelling of your abdomen, inability to pass gas or stool, vomiting despite taking nausea medication, worsening or different chest pain, shortness of breath.       A referral to primary care has been placed for you.  Please call [phone] to make an appt      Please arrange follow up with your primary medical clinic as soon as possible. You must understand that you've received an Urgent Care treatment only and that you may be released before all of your medical problems are known or treated. You, the patient, will arrange for follow up as instructed. If your symptoms worsen or fail to improve you should go to the Emergency Room.  WE CANNOT RULE OUT ALL POSSIBLE CAUSES OF YOUR SYMPTOMS IN THE URGENT CARE SETTING PLEASE GO TO THE ER IF YOU FEELS YOUR CONDITION IS WORSENING OR YOU WOULD LIKE EMERGENT EVALUATION.

## 2022-02-21 NOTE — PATIENT INSTRUCTIONS
PLEASE READ YOUR DISCHARGE INSTRUCTIONS ENTIRELY AS IT CONTAINS IMPORTANT INFORMATION.      Use gatorade/pedialyte or rehydration packets to help stay hydrated. Vitamin water and plain water do not contain rehydrating electrolytes.  Increase clear liquids (water, gatorade, pedialyte, broths, jello, etc) Hold off on solids for 12-18 hours. Then advance to BRAT diet (banana, rice, applesauce, tea, toast/crackers), then advance further as tolerated. Avoid spicy or fatty foods.   Use Peptobismol/TUMS/maalox to help alleviate your diarrhea/upset stomach symptoms.     Avoid imodium unless you have more than 6 loose stools in 24 hours.       Wash hands frequently while sick. Avoid ibuprofen or other NSAIDS until you are well.     Please go to the ER if you experience worsening abdominal pain, blood in your vomit or stool, high fever, dizziness, fainting, swelling of your abdomen, inability to pass gas or stool, vomiting despite taking nausea medication, worsening or different chest pain, shortness of breath.       A referral to primary care has been placed for you.  Please call [phone] to make an appt      Please arrange follow up with your primary medical clinic as soon as possible. You must understand that you've received an Urgent Care treatment only and that you may be released before all of your medical problems are known or treated. You, the patient, will arrange for follow up as instructed. If your symptoms worsen or fail to improve you should go to the Emergency Room.  WE CANNOT RULE OUT ALL POSSIBLE CAUSES OF YOUR SYMPTOMS IN THE URGENT CARE SETTING PLEASE GO TO THE ER IF YOU FEELS YOUR CONDITION IS WORSENING OR YOU WOULD LIKE EMERGENT EVALUATION.

## 2022-02-22 LAB — BACTERIA UR CULT: NO GROWTH

## 2022-04-12 ENCOUNTER — LAB VISIT (OUTPATIENT)
Dept: PRIMARY CARE CLINIC | Facility: CLINIC | Age: 37
End: 2022-04-12
Payer: COMMERCIAL

## 2022-04-12 ENCOUNTER — OFFICE VISIT (OUTPATIENT)
Dept: PRIMARY CARE CLINIC | Facility: CLINIC | Age: 37
End: 2022-04-12
Payer: COMMERCIAL

## 2022-04-12 VITALS
OXYGEN SATURATION: 99 % | HEART RATE: 89 BPM | SYSTOLIC BLOOD PRESSURE: 108 MMHG | WEIGHT: 171.06 LBS | DIASTOLIC BLOOD PRESSURE: 57 MMHG | TEMPERATURE: 99 F | BODY MASS INDEX: 25.34 KG/M2 | HEIGHT: 69 IN

## 2022-04-12 DIAGNOSIS — J06.9 UPPER RESPIRATORY TRACT INFECTION, UNSPECIFIED TYPE: Primary | ICD-10-CM

## 2022-04-12 DIAGNOSIS — R53.83 FATIGUE, UNSPECIFIED TYPE: ICD-10-CM

## 2022-04-12 DIAGNOSIS — L43.9 LICHEN PLANUS: ICD-10-CM

## 2022-04-12 DIAGNOSIS — Z00.00 ANNUAL PHYSICAL EXAM: ICD-10-CM

## 2022-04-12 DIAGNOSIS — Z11.3 SCREEN FOR STD (SEXUALLY TRANSMITTED DISEASE): ICD-10-CM

## 2022-04-12 DIAGNOSIS — M25.569 KNEE PAIN, UNSPECIFIED CHRONICITY, UNSPECIFIED LATERALITY: ICD-10-CM

## 2022-04-12 DIAGNOSIS — R21 RASH OF HANDS: ICD-10-CM

## 2022-04-12 LAB
CTP QC/QA: YES
CTP QC/QA: YES
FLUAV AG NPH QL: NEGATIVE
FLUBV AG NPH QL: NEGATIVE
SARS-COV-2 RDRP RESP QL NAA+PROBE: NEGATIVE

## 2022-04-12 PROCEDURE — 1159F MED LIST DOCD IN RCRD: CPT | Mod: CPTII,S$GLB,, | Performed by: FAMILY MEDICINE

## 2022-04-12 PROCEDURE — 87804 INFLUENZA ASSAY W/OPTIC: CPT | Mod: QW,S$GLB,, | Performed by: FAMILY MEDICINE

## 2022-04-12 PROCEDURE — 86592 SYPHILIS TEST NON-TREP QUAL: CPT | Performed by: FAMILY MEDICINE

## 2022-04-12 PROCEDURE — 3008F PR BODY MASS INDEX (BMI) DOCUMENTED: ICD-10-PCS | Mod: CPTII,S$GLB,, | Performed by: FAMILY MEDICINE

## 2022-04-12 PROCEDURE — 1159F PR MEDICATION LIST DOCUMENTED IN MEDICAL RECORD: ICD-10-PCS | Mod: CPTII,S$GLB,, | Performed by: FAMILY MEDICINE

## 2022-04-12 PROCEDURE — 3074F SYST BP LT 130 MM HG: CPT | Mod: CPTII,S$GLB,, | Performed by: FAMILY MEDICINE

## 2022-04-12 PROCEDURE — 87591 N.GONORRHOEAE DNA AMP PROB: CPT | Performed by: FAMILY MEDICINE

## 2022-04-12 PROCEDURE — 1160F RVW MEDS BY RX/DR IN RCRD: CPT | Mod: CPTII,S$GLB,, | Performed by: FAMILY MEDICINE

## 2022-04-12 PROCEDURE — 80061 LIPID PANEL: CPT | Performed by: FAMILY MEDICINE

## 2022-04-12 PROCEDURE — U0002: ICD-10-PCS | Mod: QW,S$GLB,, | Performed by: FAMILY MEDICINE

## 2022-04-12 PROCEDURE — 36415 COLL VENOUS BLD VENIPUNCTURE: CPT | Performed by: FAMILY MEDICINE

## 2022-04-12 PROCEDURE — 86803 HEPATITIS C AB TEST: CPT | Performed by: FAMILY MEDICINE

## 2022-04-12 PROCEDURE — 3074F PR MOST RECENT SYSTOLIC BLOOD PRESSURE < 130 MM HG: ICD-10-PCS | Mod: CPTII,S$GLB,, | Performed by: FAMILY MEDICINE

## 2022-04-12 PROCEDURE — 3008F BODY MASS INDEX DOCD: CPT | Mod: CPTII,S$GLB,, | Performed by: FAMILY MEDICINE

## 2022-04-12 PROCEDURE — 36415 PR COLLECTION VENOUS BLOOD,VENIPUNCTURE: ICD-10-PCS | Mod: S$GLB,,, | Performed by: FAMILY MEDICINE

## 2022-04-12 PROCEDURE — 36415 COLL VENOUS BLD VENIPUNCTURE: CPT | Mod: S$GLB,,, | Performed by: FAMILY MEDICINE

## 2022-04-12 PROCEDURE — 80053 COMPREHEN METABOLIC PANEL: CPT | Performed by: FAMILY MEDICINE

## 2022-04-12 PROCEDURE — 85025 COMPLETE CBC W/AUTO DIFF WBC: CPT | Performed by: FAMILY MEDICINE

## 2022-04-12 PROCEDURE — 87481 CANDIDA DNA AMP PROBE: CPT | Mod: 59 | Performed by: FAMILY MEDICINE

## 2022-04-12 PROCEDURE — 87804 POCT INFLUENZA A/B: ICD-10-PCS | Mod: QW,S$GLB,, | Performed by: FAMILY MEDICINE

## 2022-04-12 PROCEDURE — U0002 COVID-19 LAB TEST NON-CDC: HCPCS | Mod: QW,S$GLB,, | Performed by: FAMILY MEDICINE

## 2022-04-12 PROCEDURE — 83036 HEMOGLOBIN GLYCOSYLATED A1C: CPT | Performed by: FAMILY MEDICINE

## 2022-04-12 PROCEDURE — 1160F PR REVIEW ALL MEDS BY PRESCRIBER/CLIN PHARMACIST DOCUMENTED: ICD-10-PCS | Mod: CPTII,S$GLB,, | Performed by: FAMILY MEDICINE

## 2022-04-12 PROCEDURE — 99214 OFFICE O/P EST MOD 30 MIN: CPT | Mod: S$GLB,,, | Performed by: FAMILY MEDICINE

## 2022-04-12 PROCEDURE — 87389 HIV-1 AG W/HIV-1&-2 AB AG IA: CPT | Performed by: FAMILY MEDICINE

## 2022-04-12 PROCEDURE — 99999 PR PBB SHADOW E&M-EST. PATIENT-LVL III: CPT | Mod: PBBFAC,,, | Performed by: FAMILY MEDICINE

## 2022-04-12 PROCEDURE — 3078F PR MOST RECENT DIASTOLIC BLOOD PRESSURE < 80 MM HG: ICD-10-PCS | Mod: CPTII,S$GLB,, | Performed by: FAMILY MEDICINE

## 2022-04-12 PROCEDURE — 3078F DIAST BP <80 MM HG: CPT | Mod: CPTII,S$GLB,, | Performed by: FAMILY MEDICINE

## 2022-04-12 PROCEDURE — 87491 CHLMYD TRACH DNA AMP PROBE: CPT | Mod: 59 | Performed by: FAMILY MEDICINE

## 2022-04-12 PROCEDURE — 99214 PR OFFICE/OUTPT VISIT, EST, LEVL IV, 30-39 MIN: ICD-10-PCS | Mod: S$GLB,,, | Performed by: FAMILY MEDICINE

## 2022-04-12 PROCEDURE — 84443 ASSAY THYROID STIM HORMONE: CPT | Performed by: FAMILY MEDICINE

## 2022-04-12 PROCEDURE — 87801 DETECT AGNT MULT DNA AMPLI: CPT | Performed by: FAMILY MEDICINE

## 2022-04-12 PROCEDURE — 99999 PR PBB SHADOW E&M-EST. PATIENT-LVL III: ICD-10-PCS | Mod: PBBFAC,,, | Performed by: FAMILY MEDICINE

## 2022-04-12 RX ORDER — TRIAMCINOLONE ACETONIDE 1 MG/G
CREAM TOPICAL 2 TIMES DAILY
Qty: 454 G | Refills: 0 | Status: SHIPPED | OUTPATIENT
Start: 2022-04-12 | End: 2023-05-30 | Stop reason: SDUPTHER

## 2022-04-12 RX ORDER — IBUPROFEN 600 MG/1
600 TABLET ORAL EVERY 6 HOURS
Qty: 90 TABLET | Refills: 1 | Status: SHIPPED | OUTPATIENT
Start: 2022-04-12 | End: 2022-12-02 | Stop reason: SDUPTHER

## 2022-04-12 RX ORDER — INFLUENZA A VIRUS A/VICTORIA/2570/2019 IVR-215 (H1N1) ANTIGEN (PROPIOLACTONE INACTIVATED), INFLUENZA A VIRUS A/CAMBODIA/E0826360/2020 IVR-224 (H3N2) ANTIGEN (PROPIOLACTONE INACTIVATED), INFLUENZA B VIRUS B/VICTORIA/705/2018 BVR-11 ANTIGEN (PROPIOLACTONE INACTIVATED), INFLUENZA B VIRUS B/PHUKET/3073/2013 BVR-1B ANTIGEN (PROPIOLACTONE INACTIVATED) 15; 15; 15; 15 UG/.5ML; UG/.5ML; UG/.5ML; UG/.5ML
INJECTION, SUSPENSION INTRAMUSCULAR
COMMUNITY
Start: 2021-12-28 | End: 2022-04-12

## 2022-04-12 NOTE — PROGRESS NOTES
Clinic Note  4/12/2022      Subjective:       Patient ID:  Gautam is a 36 y.o. female being seen for an new visit.      Chief Complaint: Establish Care (Got sick yesterday. Symptoms include sore throat, fatigue, and body pains)    Establish care-patient with a history of thyroid nodule, arthritis of both knees, sickle cell hemoglobin C disease, panic attacks, genital herpes, tobacco use here to establish care.  Patient lives at home with fiancee of 12 years and 4-year-old son.  Getting  this summer.  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    URI symptoms-starting yesterday, patient overall up symptoms of sore throat, body aches, headaches, ear pain, fatigue, coughing, congestion.  Has completed COVID-19 vaccination series, has not gotten booster.    Fatigue-over the last 6 months, patient reports having chronic fatigue and exhaustion.  Goes to sleep at around 11:30 p.m. and wakes up around 7:00 a.m. in the morning.  Snores some, does wake up with a headache about once a week.     Allergic rhinitis-takes levo cetirizine, montelukast.  Not really using Flonase as this causes nose bleeds.  Now using Astelin nasal sprays p.r.n.    Skin rash-has a rash over the left medial ankle that is very pruritic. Uses Cerave prn.  Does use lotions and emollients daily after showering.  Has not used topical steroids in a long time    Bilateral knee pain-reports having arthritis of both knees, ibuprofen p.r.n. helps.  Has not really been exercising      Family History   Problem Relation Age of Onset    Breast cancer Neg Hx     Colon cancer Neg Hx     Ovarian cancer Neg Hx      Social History     Socioeconomic History    Marital status: Single   Tobacco Use    Smoking status: Current Every Day Smoker     Packs/day: 0.25     Years: 10.00     Pack years: 2.50     Types: Cigarettes    Smokeless tobacco: Never Used    Tobacco comment: black and milds   Substance and Sexual Activity    Alcohol use: Yes      Alcohol/week: 1.0 standard drink     Types: 1 Cans of beer per week     Comment: socially    Drug use: No    Sexual activity: Yes     Partners: Male     Birth control/protection: None, Condom     Past Surgical History:   Procedure Laterality Date    APPENDECTOMY      FOOT SURGERY Left     SPLENECTOMY, TOTAL      WISDOM TOOTH EXTRACTION       Medication List with Changes/Refills   Current Medications    AZELASTINE (ASTELIN) 137 MCG (0.1 %) NASAL SPRAY    1 spray (137 mcg total) by Nasal route 2 (two) times daily.    FLUTICASONE (FLONASE) 50 MCG/ACTUATION NASAL SPRAY    1 spray (50 mcg total) by Each Nare route once daily.    LEVOCETIRIZINE (XYZAL) 5 MG TABLET    TAKE 1 TABLET BY MOUTH AT BEDTIME    MONTELUKAST (SINGULAIR) 10 MG TABLET    TAKE 1 TABLET BY MOUTH EVERY DAY IN THE EVENING    VALACYCLOVIR (VALTREX) 1000 MG TABLET    Take 2 tablets (2,000 mg total) by mouth every 12 (twelve) hours.   Changed and/or Refilled Medications    Modified Medication Previous Medication    IBUPROFEN (ADVIL,MOTRIN) 600 MG TABLET ibuprofen (ADVIL,MOTRIN) 600 MG tablet       Take 1 tablet (600 mg total) by mouth every 6 (six) hours.    Take 1 tablet (600 mg total) by mouth every 6 (six) hours.    TRIAMCINOLONE ACETONIDE 0.1% (KENALOG) 0.1 % CREAM triamcinolone acetonide 0.1% (KENALOG) 0.1 % cream       Apply topically 2 (two) times daily. for 10 days    Apply topically 2 (two) times daily.   Discontinued Medications    AFLURIA QD 2021-22,3YR UP,,PF, 60 MCG (15 MCG X 4)/0.5 ML SYRG        FOLIC ACID (FOLVITE) 1 MG TABLET    TAKE 4 TABLETS (4 MG TOTAL) BY MOUTH ONCE DAILY.    NORELGESTROMIN-ETHINYL ESTRADIOL (ORTHO EVRA) 150-35 MCG/24 HR    Place 1 patch onto the skin every 7 days.     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 Negative for chills, fever and weight loss.   HENT: Positive for congestion, ear pain and sore throat. Negative for ear discharge, hearing loss, nosebleeds, sinus pain and tinnitus.    Respiratory: Positive for cough. Negative for hemoptysis, sputum production, shortness of breath, wheezing and stridor.    Cardiovascular: Negative for chest pain and palpitations.   Gastrointestinal: Negative for constipation, diarrhea, nausea and vomiting.   Genitourinary: Negative for dysuria, frequency and urgency.   Musculoskeletal: Positive for myalgias.   Skin: Negative for rash.   Neurological: Negative for headaches.         Objective:      BP (!) 108/57 (BP Location: Left arm, Patient Position: Sitting, BP Method: Small (Automatic))   Pulse 89   Temp 98.7 °F (37.1 °C)   Ht 5' 9" (1.753 m)   Wt 77.6 kg (171 lb 1.2 oz)   LMP 03/16/2022 (Exact Date)   SpO2 99%   BMI 25.26 kg/m²   Estimated body mass index is 25.26 kg/m² as calculated from the following:    Height as of this encounter: 5' 9" (1.753 m).    Weight as of this encounter: 77.6 kg (171 lb 1.2 oz).  Physical Exam  Vitals reviewed.   Constitutional:       General: She is not in acute distress.     Appearance: She is not diaphoretic.   HENT:      Head: Normocephalic and atraumatic.      Right Ear: Ear canal and external ear normal. There is no impacted cerumen.      Left Ear: Ear canal and external ear normal. There is no impacted cerumen.      Ears:      Comments: Middle ear effusion bilaterally     Nose: Congestion present. No rhinorrhea.      Mouth/Throat:      Mouth: Mucous membranes are moist.      Pharynx: Oropharynx is clear. Posterior oropharyngeal erythema present. No oropharyngeal exudate.   Eyes:      General:         Right eye: No discharge.         Left eye: No discharge.      Extraocular Movements: " Extraocular movements intact.      Conjunctiva/sclera: Conjunctivae normal.   Cardiovascular:      Rate and Rhythm: Normal rate and regular rhythm.      Pulses: Normal pulses.      Heart sounds: Normal heart sounds.   Pulmonary:      Effort: Pulmonary effort is normal. No respiratory distress.      Breath sounds: Normal breath sounds. No wheezing.   Abdominal:      General: Bowel sounds are normal.      Palpations: Abdomen is soft.   Musculoskeletal:         General: Normal range of motion.      Cervical back: Normal range of motion.        Feet:    Skin:     General: Skin is warm and dry.      Findings: No erythema or rash.   Neurological:      Mental Status: She is alert and oriented to person, place, and time.   Psychiatric:         Mood and Affect: Mood and affect normal.         Behavior: Behavior normal.         Thought Content: Thought content normal.         Judgment: Judgment normal.           Assessment and Plan:     1. Upper respiratory tract infection, unspecified type  - vital signs unremarkable, rapid swabs today, recommend supportive therapy  - POCT Influenza A/B: neg  - POCT COVID-19 Rapid Screening; Future  - POCT COVID-19 Rapid Screening: neg    2. Annual physical exam  - up-to-date with health maintenance    3. Fatigue, unspecified type  - CBC Auto Differential; Future  - Comprehensive Metabolic Panel; Future  - Lipid Panel; Future  - Hemoglobin A1C; Future  - TSH; Future  - HIV 1/2 Ag/Ab (4th Gen); Future  - RPR; Future  - Hepatitis C Antibody; Future    4. Screen for STD (sexually transmitted disease)  - Vaginosis Screen by DNA Probe  - C. trachomatis/N. gonorrhoeae by AMP DNA    6. Knee pain, unspecified chronicity, unspecified laterality  - ibuprofen (ADVIL,MOTRIN) 600 MG tablet; Take 1 tablet (600 mg total) by mouth every 6 (six) hours.  Dispense: 90 tablet; Refill: 1    7. Lichen planus  - triamcinolone acetonide 0.1% (KENALOG) 0.1 % cream; Apply topically 2 (two) times daily. for 10 days   Dispense: 454 g; Refill: 0        Follow up:   No follow-ups on file.     Other Orders Placed This Visit:  Orders Placed This Encounter   Procedures    Vaginosis Screen by DNA Probe     Release to patient->Immediate     Order Specific Question:   Release to patient     Answer:   Immediate    C. trachomatis/N. gonorrhoeae by AMP DNA     Order Specific Question:   Source:     Answer:   Vagina    CBC Auto Differential     Standing Status:   Future     Number of Occurrences:   1     Standing Expiration Date:   4/12/2023    Comprehensive Metabolic Panel     Standing Status:   Future     Number of Occurrences:   1     Standing Expiration Date:   4/12/2023    Lipid Panel     Standing Status:   Future     Number of Occurrences:   1     Standing Expiration Date:   4/12/2023    Hemoglobin A1C     Standing Status:   Future     Number of Occurrences:   1     Standing Expiration Date:   4/12/2023    TSH     Standing Status:   Future     Number of Occurrences:   1     Standing Expiration Date:   4/12/2023    HIV 1/2 Ag/Ab (4th Gen)     Standing Status:   Future     Number of Occurrences:   1     Standing Expiration Date:   4/12/2023    RPR     Standing Status:   Future     Number of Occurrences:   1     Standing Expiration Date:   4/12/2023    Hepatitis C Antibody     Standing Status:   Future     Number of Occurrences:   1     Standing Expiration Date:   4/12/2023    POCT Influenza A/B    POCT COVID-19 Rapid Screening     Standing Status:   Future     Number of Occurrences:   1     Standing Expiration Date:   6/11/2023     Order Specific Question:   Is the patient symptomatic?     Answer:   Yes           Jeffery Chawla MD        This note is dictated on M*Modal word recognition program.  There are word recognition mistakes that are occasionally missed on review.

## 2022-04-13 LAB
ALBUMIN SERPL BCP-MCNC: 4.7 G/DL (ref 3.5–5.2)
ALP SERPL-CCNC: 74 U/L (ref 55–135)
ALT SERPL W/O P-5'-P-CCNC: 14 U/L (ref 10–44)
ANION GAP SERPL CALC-SCNC: 10 MMOL/L (ref 8–16)
ANISOCYTOSIS BLD QL SMEAR: SLIGHT
AST SERPL-CCNC: 20 U/L (ref 10–40)
BASOPHILS # BLD AUTO: 0.11 K/UL (ref 0–0.2)
BASOPHILS NFR BLD: 0.5 % (ref 0–1.9)
BILIRUB SERPL-MCNC: 1.1 MG/DL (ref 0.1–1)
BUN SERPL-MCNC: 6 MG/DL (ref 6–20)
C TRACH DNA SPEC QL NAA+PROBE: NOT DETECTED
CALCIUM SERPL-MCNC: 9.9 MG/DL (ref 8.7–10.5)
CHLORIDE SERPL-SCNC: 106 MMOL/L (ref 95–110)
CHOLEST SERPL-MCNC: 193 MG/DL (ref 120–199)
CHOLEST/HDLC SERPL: 5.2 {RATIO} (ref 2–5)
CO2 SERPL-SCNC: 26 MMOL/L (ref 23–29)
CREAT SERPL-MCNC: 0.7 MG/DL (ref 0.5–1.4)
DIFFERENTIAL METHOD: ABNORMAL
EOSINOPHIL # BLD AUTO: 0.2 K/UL (ref 0–0.5)
EOSINOPHIL NFR BLD: 1.1 % (ref 0–8)
ERYTHROCYTE [DISTWIDTH] IN BLOOD BY AUTOMATED COUNT: 14.4 % (ref 11.5–14.5)
EST. GFR  (AFRICAN AMERICAN): >60 ML/MIN/1.73 M^2
EST. GFR  (NON AFRICAN AMERICAN): >60 ML/MIN/1.73 M^2
ESTIMATED AVG GLUCOSE: ABNORMAL MG/DL (ref 68–131)
GLUCOSE SERPL-MCNC: 85 MG/DL (ref 70–110)
HBA1C MFR BLD: <4 % (ref 4–5.6)
HCT VFR BLD AUTO: 33.3 % (ref 37–48.5)
HDLC SERPL-MCNC: 37 MG/DL (ref 40–75)
HDLC SERPL: 19.2 % (ref 20–50)
HGB BLD-MCNC: 12.1 G/DL (ref 12–16)
IMM GRANULOCYTES # BLD AUTO: 0.08 K/UL (ref 0–0.04)
IMM GRANULOCYTES NFR BLD AUTO: 0.4 % (ref 0–0.5)
LDLC SERPL CALC-MCNC: 125.4 MG/DL (ref 63–159)
LYMPHOCYTES # BLD AUTO: 3.8 K/UL (ref 1–4.8)
LYMPHOCYTES NFR BLD: 18.5 % (ref 18–48)
MCH RBC QN AUTO: 30 PG (ref 27–31)
MCHC RBC AUTO-ENTMCNC: 36.3 G/DL (ref 32–36)
MCV RBC AUTO: 82 FL (ref 82–98)
MONOCYTES # BLD AUTO: 1.6 K/UL (ref 0.3–1)
MONOCYTES NFR BLD: 7.7 % (ref 4–15)
N GONORRHOEA DNA SPEC QL NAA+PROBE: NOT DETECTED
NEUTROPHILS # BLD AUTO: 14.6 K/UL (ref 1.8–7.7)
NEUTROPHILS NFR BLD: 71.8 % (ref 38–73)
NONHDLC SERPL-MCNC: 156 MG/DL
NRBC BLD-RTO: 0 /100 WBC
PLATELET # BLD AUTO: 317 K/UL (ref 150–450)
PLATELET BLD QL SMEAR: ABNORMAL
PMV BLD AUTO: 12.4 FL (ref 9.2–12.9)
POTASSIUM SERPL-SCNC: 3.9 MMOL/L (ref 3.5–5.1)
PROT SERPL-MCNC: 7.9 G/DL (ref 6–8.4)
RBC # BLD AUTO: 4.04 M/UL (ref 4–5.4)
RPR SER QL: NORMAL
SODIUM SERPL-SCNC: 142 MMOL/L (ref 136–145)
TRIGL SERPL-MCNC: 153 MG/DL (ref 30–150)
TSH SERPL DL<=0.005 MIU/L-ACNC: 0.79 UIU/ML (ref 0.4–4)
WBC # BLD AUTO: 20.39 K/UL (ref 3.9–12.7)

## 2022-04-14 ENCOUNTER — PATIENT MESSAGE (OUTPATIENT)
Dept: PRIMARY CARE CLINIC | Facility: CLINIC | Age: 37
End: 2022-04-14
Payer: COMMERCIAL

## 2022-04-14 LAB
BACTERIAL VAGINOSIS DNA: POSITIVE
CANDIDA GLABRATA DNA: NEGATIVE
CANDIDA KRUSEI DNA: NEGATIVE
CANDIDA RRNA VAG QL PROBE: NEGATIVE
T VAGINALIS RRNA GENITAL QL PROBE: NEGATIVE

## 2022-04-14 RX ORDER — METRONIDAZOLE 500 MG/1
500 TABLET ORAL EVERY 12 HOURS
Qty: 14 TABLET | Refills: 0 | Status: SHIPPED | OUTPATIENT
Start: 2022-04-14 | End: 2022-04-21

## 2022-04-15 LAB
HCV AB SERPL QL IA: NEGATIVE
HIV 1+2 AB+HIV1 P24 AG SERPL QL IA: NEGATIVE

## 2022-04-18 DIAGNOSIS — D72.829 LEUKOCYTOSIS, UNSPECIFIED TYPE: Primary | ICD-10-CM

## 2022-06-02 DIAGNOSIS — R52 BODY ACHES: Primary | ICD-10-CM

## 2022-06-02 LAB
CTP QC/QA: YES
SARS-COV-2 AG RESP QL IA.RAPID: POSITIVE

## 2022-06-06 ENCOUNTER — PATIENT MESSAGE (OUTPATIENT)
Dept: INFECTIOUS DISEASES | Facility: HOSPITAL | Age: 37
End: 2022-06-06
Payer: COMMERCIAL

## 2022-06-09 ENCOUNTER — PATIENT MESSAGE (OUTPATIENT)
Dept: OBSTETRICS AND GYNECOLOGY | Facility: CLINIC | Age: 37
End: 2022-06-09
Payer: COMMERCIAL

## 2022-06-09 ENCOUNTER — TELEPHONE (OUTPATIENT)
Dept: OBSTETRICS AND GYNECOLOGY | Facility: CLINIC | Age: 37
End: 2022-06-09
Payer: COMMERCIAL

## 2022-07-28 ENCOUNTER — PATIENT MESSAGE (OUTPATIENT)
Dept: OBSTETRICS AND GYNECOLOGY | Facility: CLINIC | Age: 37
End: 2022-07-28
Payer: COMMERCIAL

## 2022-07-28 ENCOUNTER — PATIENT MESSAGE (OUTPATIENT)
Dept: PRIMARY CARE CLINIC | Facility: CLINIC | Age: 37
End: 2022-07-28
Payer: COMMERCIAL

## 2022-07-28 RX ORDER — FLUCONAZOLE 150 MG/1
150 TABLET ORAL ONCE
Qty: 1 TABLET | Refills: 0 | Status: SHIPPED | OUTPATIENT
Start: 2022-07-28 | End: 2022-07-28

## 2022-07-29 RX ORDER — FLUCONAZOLE 150 MG/1
150 TABLET ORAL ONCE
Qty: 1 TABLET | Refills: 0 | Status: SHIPPED | OUTPATIENT
Start: 2022-07-29 | End: 2022-07-29

## 2022-08-04 ENCOUNTER — TELEPHONE (OUTPATIENT)
Dept: OBSTETRICS AND GYNECOLOGY | Facility: CLINIC | Age: 37
End: 2022-08-04

## 2022-08-24 ENCOUNTER — TELEPHONE (OUTPATIENT)
Dept: OPHTHALMOLOGY | Facility: CLINIC | Age: 37
End: 2022-08-24
Payer: COMMERCIAL

## 2022-09-06 DIAGNOSIS — J02.9 SORE THROAT: Primary | ICD-10-CM

## 2022-09-06 LAB
CTP QC/QA: YES
SARS-COV-2 AG RESP QL IA.RAPID: POSITIVE

## 2022-09-06 PROCEDURE — 87811 SARS CORONAVIRUS 2 ANTIGEN POCT, MANUAL READ: ICD-10-PCS | Mod: QW,S$GLB,, | Performed by: EMERGENCY MEDICINE

## 2022-09-06 PROCEDURE — 87811 SARS-COV-2 COVID19 W/OPTIC: CPT | Mod: QW,S$GLB,, | Performed by: EMERGENCY MEDICINE

## 2022-09-07 ENCOUNTER — PATIENT MESSAGE (OUTPATIENT)
Dept: PRIMARY CARE CLINIC | Facility: CLINIC | Age: 37
End: 2022-09-07
Payer: COMMERCIAL

## 2022-09-07 DIAGNOSIS — U07.1 COVID-19: Primary | ICD-10-CM

## 2022-09-09 ENCOUNTER — PATIENT MESSAGE (OUTPATIENT)
Dept: PRIMARY CARE CLINIC | Facility: CLINIC | Age: 37
End: 2022-09-09
Payer: COMMERCIAL

## 2022-09-09 RX ORDER — BENZONATATE 100 MG/1
100 CAPSULE ORAL 3 TIMES DAILY PRN
Qty: 30 CAPSULE | Refills: 0 | Status: SHIPPED | OUTPATIENT
Start: 2022-09-09 | End: 2022-09-19

## 2022-09-09 RX ORDER — PROMETHAZINE HYDROCHLORIDE AND DEXTROMETHORPHAN HYDROBROMIDE 6.25; 15 MG/5ML; MG/5ML
5 SYRUP ORAL EVERY 4 HOURS PRN
Qty: 118 ML | Refills: 0 | Status: SHIPPED | OUTPATIENT
Start: 2022-09-09 | End: 2022-09-19

## 2022-09-20 ENCOUNTER — PATIENT MESSAGE (OUTPATIENT)
Dept: OPTOMETRY | Facility: CLINIC | Age: 37
End: 2022-09-20
Payer: COMMERCIAL

## 2022-09-21 NOTE — LETTER
January 9, 2017      Donna Akhtar MD  [address]  [facility] [address]           [facility] Otorhinolaryngology  [address]  [facility] [address]  Phone: [phone]  Fax: [phone]          Patient: Gautam Sotomayor   MR Number: [id number]   YOB: 1985   Date of Visit: 1/9/2017       Dear Dr. Donna Akhtar:    Thank you for referring Gautam Sotomayor to me for evaluation. Attached you will find relevant portions of my assessment and plan of care.    If you have questions, please do not hesitate to call me. I look forward to following Gautam Sotomayor along with you.    Sincerely,    Dipesh Clay MD    Enclosure  CC:  No Recipients    If you would like to receive this communication electronically, please contact [email or web address] or [phone] to request more information on [facility] Link access.    For providers and/or their staff who would like to refer a patient to [facility], please contact us through our one-stop-shop provider referral line, [facility], at [phone].    If you feel you have received this communication in error or would no longer like to receive these types of communications, please e-mail [email or web address]         
yes

## 2022-10-03 ENCOUNTER — OFFICE VISIT (OUTPATIENT)
Dept: OTOLARYNGOLOGY | Facility: CLINIC | Age: 37
End: 2022-10-03
Payer: COMMERCIAL

## 2022-10-03 DIAGNOSIS — J31.0 CHRONIC RHINITIS: ICD-10-CM

## 2022-10-03 DIAGNOSIS — H65.93 OME (OTITIS MEDIA WITH EFFUSION), BILATERAL: Primary | ICD-10-CM

## 2022-10-03 DIAGNOSIS — H91.90 HEARING LOSS, UNSPECIFIED HEARING LOSS TYPE, UNSPECIFIED LATERALITY: ICD-10-CM

## 2022-10-03 PROCEDURE — 99999 PR PBB SHADOW E&M-EST. PATIENT-LVL III: CPT | Mod: PBBFAC,,, | Performed by: NURSE PRACTITIONER

## 2022-10-03 PROCEDURE — 99213 PR OFFICE/OUTPT VISIT, EST, LEVL III, 20-29 MIN: ICD-10-PCS | Mod: S$GLB,,, | Performed by: NURSE PRACTITIONER

## 2022-10-03 PROCEDURE — 3046F PR MOST RECENT HEMOGLOBIN A1C LEVEL > 9.0%: ICD-10-PCS | Mod: CPTII,S$GLB,, | Performed by: NURSE PRACTITIONER

## 2022-10-03 PROCEDURE — 1160F PR REVIEW ALL MEDS BY PRESCRIBER/CLIN PHARMACIST DOCUMENTED: ICD-10-PCS | Mod: CPTII,S$GLB,, | Performed by: NURSE PRACTITIONER

## 2022-10-03 PROCEDURE — 99999 PR PBB SHADOW E&M-EST. PATIENT-LVL III: ICD-10-PCS | Mod: PBBFAC,,, | Performed by: NURSE PRACTITIONER

## 2022-10-03 PROCEDURE — 1159F MED LIST DOCD IN RCRD: CPT | Mod: CPTII,S$GLB,, | Performed by: NURSE PRACTITIONER

## 2022-10-03 PROCEDURE — 99213 OFFICE O/P EST LOW 20 MIN: CPT | Mod: S$GLB,,, | Performed by: NURSE PRACTITIONER

## 2022-10-03 PROCEDURE — 1160F RVW MEDS BY RX/DR IN RCRD: CPT | Mod: CPTII,S$GLB,, | Performed by: NURSE PRACTITIONER

## 2022-10-03 PROCEDURE — 1159F PR MEDICATION LIST DOCUMENTED IN MEDICAL RECORD: ICD-10-PCS | Mod: CPTII,S$GLB,, | Performed by: NURSE PRACTITIONER

## 2022-10-03 PROCEDURE — 3046F HEMOGLOBIN A1C LEVEL >9.0%: CPT | Mod: CPTII,S$GLB,, | Performed by: NURSE PRACTITIONER

## 2022-10-03 RX ORDER — MONTELUKAST SODIUM 10 MG/1
10 TABLET ORAL NIGHTLY
Qty: 90 TABLET | Refills: 2 | Status: SHIPPED | OUTPATIENT
Start: 2022-10-03 | End: 2022-12-02 | Stop reason: SDUPTHER

## 2022-10-03 RX ORDER — AMOXICILLIN 500 MG/1
500 TABLET, FILM COATED ORAL EVERY 12 HOURS
Qty: 20 TABLET | Refills: 0 | Status: SHIPPED | OUTPATIENT
Start: 2022-10-03 | End: 2022-10-13

## 2022-10-03 NOTE — PROGRESS NOTES
Subjective:      Gautam Sotomayor is a 37 y.o. female who was self-referred for aural fullness.    Ms. Sotomayor reports having ear fullness (L>R) for the past year. She denies ear pain, ear drainage, tinnitus or dizziness. She has tried fluticasone, various oral antihistamines without benefit. She denies any recent antibiotic use.   There is not a family history of hearing loss at a young age.  There is not a prior history of ear surgery.  There is not a prior history of ear infections .  She denies a history of significant noise exposure.  She does not wear hearing aids currently.  She has not had a hearing test recently.     Past Medical History  She has a past medical history of Abnormal Pap smear of cervix, Allergic rhinitis, Anemia, Gallstone, Genital herpes, Hemoglobin C disease, and Sickle cell disease, type SC.    Past Surgical History  She has a past surgical history that includes Appendectomy; Splenectomy, total; Foot surgery (Left); and [place] tooth extraction.    Family History  Her family history is not on file.    Social History  She reports that she has been smoking cigarettes. She has a 2.50 pack-year smoking history. She has never used smokeless tobacco. She reports current alcohol use of about 1.0 standard drink per week. She reports that she does not use drugs.    Allergies  She is allergic to prednisone, doxycycline, and nickel.    Medications  She has a current medication list which includes the following prescription(s): azelastine, fluticasone propionate, ibuprofen, levocetirizine, amoxicillin, montelukast, triamcinolone acetonide 0.1%, and valacyclovir.    Review of Systems   Constitutional:  Negative for chills, fatigue and fever.   HENT:  Positive for congestion, hearing loss, postnasal drip, rhinorrhea and sneezing. Negative for facial swelling, nosebleeds, sinus pressure, sinus pain, sore throat and tinnitus.    Eyes:  Negative for photophobia, redness, itching and visual  disturbance.   Respiratory:  Negative for apnea, cough and shortness of breath.    Cardiovascular:  Negative for chest pain and palpitations.   Gastrointestinal:  Negative for diarrhea, nausea and vomiting.   Endocrine: Negative.    Skin:  Negative for rash and wound.   Allergic/Immunologic: Negative for environmental allergies, food allergies and immunocompromised state.   Neurological:  Negative for dizziness, light-headedness and headaches.   Hematological:  Negative for adenopathy. Does not bruise/bleed easily.   Psychiatric/Behavioral:  Negative for confusion, decreased concentration and sleep disturbance.         Objective:     There were no vitals taken for this visit.     Constitutional:   She is oriented to person, place, and time. Vital signs are normal. She appears well-developed and well-nourished. She appears alert. Normal speech.      Head:  Normocephalic and atraumatic.     Ears:    Right Ear: No lacerations. No drainage, swelling or tenderness. No foreign bodies. No mastoid tenderness. Tympanic membrane is not injected, not scarred, not perforated, not erythematous, not retracted and not bulging. Tympanic membrane mobility is normal. A middle ear effusion is present. No hemotympanum. Decreased hearing is noted.   Left Ear: No lacerations. No drainage, swelling or tenderness. No foreign bodies. No mastoid tenderness. Tympanic membrane is not injected, not scarred, not perforated, not erythematous, not retracted and not bulging. Tympanic membrane mobility is normal. A middle ear effusion is present. No hemotympanum. Decreased hearing is noted.     Nose:  Mucosal edema present. No rhinorrhea, nose lacerations, sinus tenderness, septal deviation, nasal septal hematoma or polyps. No epistaxis.  No foreign bodies. Turbinate hypertrophy.  Right sinus exhibits no maxillary sinus tenderness and no frontal sinus tenderness. Left sinus exhibits no maxillary sinus tenderness and no frontal sinus tenderness.      Mouth/Throat  Oropharynx clear and moist without lesions or asymmetry, normal uvula midline and lips, teeth, and gums normal. No uvula swelling, oral lesions, trismus, mucous membrane lesions or xerostomia. No oropharyngeal exudate, posterior oropharyngeal edema or posterior oropharyngeal erythema.     Neck:  Neck normal without thyromegaly masses, asymmetry, normal tracheal structure, crepitus, and tenderness and no adenopathy.     Psychiatric:   She has a normal mood and affect. Her speech is normal and behavior is normal.     Neurological:   She is alert and oriented to person, place, and time. No cranial nerve deficit.     Skin:   No abrasions, lacerations, lesions, or rashes.     Procedure    None      Data Reviewed    WBC (K/uL)   Date Value   04/12/2022 20.39 (H)     Platelets (K/uL)   Date Value   04/12/2022 317      Creatinine (mg/dL)   Date Value   04/12/2022 0.7     TSH (uIU/mL)   Date Value   04/12/2022 0.786     Glucose (mg/dL)   Date Value   04/12/2022 85     Hemoglobin A1C (%)   Date Value   04/12/2022 <4.0 (A)            Assessment:     1. OME (otitis media with effusion), bilateral    2. Chronic rhinitis    3. Hearing loss, unspecified hearing loss type, unspecified laterality         Plan:     Patient has bilateral OME. She is currently taking xyzal and Singulair daily. Will do a trial of oral antibiotics (amoxicillin 500mg bid x 10 days). Referral placed to neurotology for evaluation of PE tubes. I have placed consultation to audiology for audiometric testing to evaluate for CHL. Refilled Singulair.     Continue Xyzal and Singulair as directed.     She will follow up with me in one month for nasal endoscopy.

## 2022-10-25 ENCOUNTER — OFFICE VISIT (OUTPATIENT)
Dept: OTOLARYNGOLOGY | Facility: CLINIC | Age: 37
End: 2022-10-25
Payer: COMMERCIAL

## 2022-10-25 ENCOUNTER — CLINICAL SUPPORT (OUTPATIENT)
Dept: AUDIOLOGY | Facility: CLINIC | Age: 37
End: 2022-10-25
Payer: COMMERCIAL

## 2022-10-25 VITALS — WEIGHT: 171 LBS | BODY MASS INDEX: 25.25 KG/M2

## 2022-10-25 DIAGNOSIS — S03.00XA DISLOCATION OF TEMPOROMANDIBULAR JOINT, INITIAL ENCOUNTER: ICD-10-CM

## 2022-10-25 DIAGNOSIS — H69.93 DYSFUNCTION OF BOTH EUSTACHIAN TUBES: Primary | ICD-10-CM

## 2022-10-25 DIAGNOSIS — H69.92 DYSFUNCTION OF LEFT EUSTACHIAN TUBE: ICD-10-CM

## 2022-10-25 PROCEDURE — 92567 TYMPANOMETRY: CPT | Mod: S$GLB,,,

## 2022-10-25 PROCEDURE — 99999 PR PBB SHADOW E&M-EST. PATIENT-LVL II: CPT | Mod: PBBFAC,,,

## 2022-10-25 PROCEDURE — 92557 PR COMPREHENSIVE HEARING TEST: ICD-10-PCS | Mod: S$GLB,,,

## 2022-10-25 PROCEDURE — 92567 PR TYMPA2METRY: ICD-10-PCS | Mod: S$GLB,,,

## 2022-10-25 PROCEDURE — 99999 PR PBB SHADOW E&M-EST. PATIENT-LVL II: ICD-10-PCS | Mod: PBBFAC,,,

## 2022-10-25 PROCEDURE — 99999 PR PBB SHADOW E&M-EST. PATIENT-LVL III: CPT | Mod: PBBFAC,,, | Performed by: OTOLARYNGOLOGY

## 2022-10-25 PROCEDURE — 92557 COMPREHENSIVE HEARING TEST: CPT | Mod: S$GLB,,,

## 2022-10-25 PROCEDURE — 99213 OFFICE O/P EST LOW 20 MIN: CPT | Mod: S$GLB,,, | Performed by: OTOLARYNGOLOGY

## 2022-10-25 PROCEDURE — 99999 PR PBB SHADOW E&M-EST. PATIENT-LVL III: ICD-10-PCS | Mod: PBBFAC,,, | Performed by: OTOLARYNGOLOGY

## 2022-10-25 PROCEDURE — 99213 PR OFFICE/OUTPT VISIT, EST, LEVL III, 20-29 MIN: ICD-10-PCS | Mod: S$GLB,,, | Performed by: OTOLARYNGOLOGY

## 2022-10-25 NOTE — PROGRESS NOTES
Otology/Neurotology History and Physical     CC: recurrent ear infections    HPI:  Gautam Sotomayor is a 37 y.o. female who was referred to me by Efraín Hamm in consultation for recurrent ear infections. Patient states she has chronic issues with ear fullness and pain in her ears. States this is aggravated when she flies. She reports 4 ear infections in the past 2 years. Recently completed a course of abx, which she states provided resolution of her symptoms. States abx are the only thing the relieve her symptoms. Also complains of pain in her ears when the weather gets cold. Says she has to put tissue in her ears to relieve the pain. No history of tubes as a child.       Past Medical History  She has a past medical history of Abnormal Pap smear of cervix, Allergic rhinitis, Anemia, Gallstone, Genital herpes, Hemoglobin C disease, and Sickle cell disease, type SC.    Past Surgical History  She has a past surgical history that includes Appendectomy; Splenectomy, total; Foot surgery (Left); and [place] tooth extraction.    Family History  Her family history is not on file.    Social History  She reports that she has been smoking cigarettes. She has a 2.50 pack-year smoking history. She has never used smokeless tobacco. She reports current alcohol use of about 1.0 standard drink per week. She reports that she does not use drugs.    Allergies  She is allergic to prednisone, doxycycline, and nickel.    Medications  She has a current medication list which includes the following prescription(s): azelastine, fluticasone propionate, ibuprofen, levocetirizine, montelukast, triamcinolone acetonide 0.1%, and valacyclovir.    Review of Systems       Objective:     Wt 77.6 kg (171 lb)   BMI 25.25 kg/m²    Physical Exam  Constitutional: Well appearing/communicating. Voice clear. NAD.  Eyes: EOM I Bilaterally  Head/Face: Normocephalic.  House Brackmann I Bilaterally.  Right Ear: External Auditory Canal WNL,TM w/o  masses/lesions/perforations. No NANCY.  Auricle WNL.  Left Ear: External Auditory Canal WNL,TM w/o masses/lesions/perforations. No NANCY. Auricle WNL.  Nose: External nasal skin without masses/lesions.  Neuro/Psychiatric: AOx3.  Normal mood and affect.   Respiratory: Normal respiratory effort, no stridor/stertor, no retractions noted.      Procedure    None        Data Reviewed           Assessment:     1. Dysfunction of both eustachian tubes    2. Dislocation of temporomandibular joint, initial encounter         Plan:   - no ear infection today, no NANCY bilaterally  - discussed popping ears before flights  - discussed TMJ management  - RTC prn

## 2022-10-25 NOTE — PROGRESS NOTES
Gautam Sotomayor was seen today in the clinic for an audiologic evaluation.  Patient's main complaint was decreased hearing in the right ear and bilateral aural fullness.  Ms. Sotomayor reported constant bilateral otalgia and a history of otitis media. She denied tinnitus, vertigo, and family history of childhood hearing loss.    Tympanometry revealed Type A in the right ear and Type As in the left ear.     Audiogram results revealed essentially normal hearing with mild hearing loss at 250 and 8000 Hz in the right ear and normal hearing sensitivity in the left ear.      Speech reception thresholds were noted at 10 dB in the right ear and 10 dB in the left ear.    Speech discrimination scores were 100% in the right ear and 100% in the left ear.    Recommendations:  Otologic evaluation  Annual audiogram  Hearing protection when in noise

## 2022-11-07 ENCOUNTER — OFFICE VISIT (OUTPATIENT)
Dept: OPTOMETRY | Facility: CLINIC | Age: 37
End: 2022-11-07
Payer: COMMERCIAL

## 2022-11-07 DIAGNOSIS — Z46.0 ENCOUNTER FOR FITTING OR ADJUSTMENT OF SPECTACLES OR CONTACT LENSES: Primary | ICD-10-CM

## 2022-11-07 PROCEDURE — 99499 NO LOS: ICD-10-PCS | Mod: S$GLB,,, | Performed by: OPTOMETRIST

## 2022-11-07 PROCEDURE — 99499 UNLISTED E&M SERVICE: CPT | Mod: S$GLB,,, | Performed by: OPTOMETRIST

## 2022-11-07 PROCEDURE — 92310 PR CONTACT LENS FITTING (NO CHANGE): ICD-10-PCS | Mod: S$GLB,,, | Performed by: OPTOMETRIST

## 2022-11-07 PROCEDURE — 92310 CONTACT LENS FITTING OU: CPT | Mod: S$GLB,,, | Performed by: OPTOMETRIST

## 2022-11-08 ENCOUNTER — PATIENT MESSAGE (OUTPATIENT)
Dept: OPTOMETRY | Facility: CLINIC | Age: 37
End: 2022-11-08
Payer: COMMERCIAL

## 2022-11-08 NOTE — PATIENT INSTRUCTIONS
Ms. Sotomayor in today for contact lens follow-up visit.  Needs updated contact lens Rx.   Good lens fit in each eye with present Acuvue Oasys SCLs.  Showing no adverse effects of contact lens wear in either eye.  Showing need for only minor power change (decrease) in each lens, per over-refraction done today.  New contact lens Rx issued for new lenses, with power change in each lens.  Maintain daily wear schedule.  Clean and soak lenses daily.  Replace at least once per month.    Return at a later date for general eye exam, refraction, tonometry and dilated fundus exam.  Ms. Arora to call to schedule that appointment.

## 2022-11-08 NOTE — PROGRESS NOTES
HPI     Contact Lens Follow Up            Comments: Wears SCLs, and needs updated contact lens Rx.  Reports no problems with CLs.  Good comfort OD and OS, and not unhappy   with [facility] with CLs.  Wears on daily-wear schedule.  Replaces monthly.   Refer to contact lens section of record.             Comments    Patient's age: 37 y.o. AA female  Occupation: Nurse [employer]   Approximate date of last eye examination: 05/17/2021  Name of last eye doctor seen:    City/State: [facility]   Wears glasses? Yes    Any problem with [facility] with glasses?  No   Wears CLs?:  Yes            If yes:              Type of CL worn:  States she is wearing Acuvue Oasys SCLs   prescribed by OD at [facility].                      Last CL Rx:  Acuvue Oasys OD  8.4   14.0  -3.00                                                                  OS 8.4     14.0   -3.00                                              Wears full-time or part-time:  Full time                Sleeps with contact lenses:  No               How often replace CLs: monthly              Any problem with [facility] with CLs?  No         Headaches?  No  Eye pain/discomfort?  No                                                                                     Flashes?  No  Floaters?  No  Diplopia/Double vision?  No  Patient's Ocular History:         Any eye surgeries? No         Any eye injury?  No         Any treatment for eye disease?  ? Sickle cell - like retinal   changes - previously followed by Dr. Ramachandran at [facility] in [place], [place]  -   s/p spleenectomy.    Family history of eye disease?  No  Significant patient medical history:         1. Diabetes?  No       If yes, IDDM or NIDDM? n/a   2. HBP?  No              3. 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 OTC eyedrops currently using: optic free    ! Prescription eye meds currently using:  No   ! Any history of allergy/adverse reaction to any eye meds used   previously?  No    ! Any history of allergy/adverse  reaction to eyedrops used during prior   eye exam(s)? No    ! Any history of allergy/adverse reaction to Novacaine or similar meds?   No   ! Any history of allergy/adverse reaction to Epinephrine or similar meds?   No    ! Patient okay with use of anesthetic eyedrops to check eye pressure?    yes        ! Patient okay with use of eyedrops to dilate pupils today?  yes   !  Allergies/Medications/Medical History/Family History reviewed today?    Yes       PD =   65/61                                                         Last edited by Jaylon Crisostomo, OD on 11/7/2022  5:21 PM.            Assessment /Plan     For exam results, see Encounter Report.    1. Encounter for fitting or adjustment of spectacles or contact lenses                       Ms. Sotomayor in today for contact lens follow-up visit.  Needs updated contact lens Rx.   Good lens fit in each eye with present Acuvue Oasys SCLs.  Showing no adverse effects of contact lens wear in either eye.  Showing need for only minor power change (decrease) in each lens, per over-refraction done today.  New contact lens Rx issued for new lenses, with power change in each lens.  Maintain daily wear schedule.  Clean and soak lenses daily.  Replace at least once per month.    Return at a later date for general eye exam, refraction, tonometry and dilated fundus exam.  Ms. Arora to call to schedule that appointment.

## 2022-11-09 ENCOUNTER — IMMUNIZATION (OUTPATIENT)
Dept: INTERNAL MEDICINE | Facility: CLINIC | Age: 37
End: 2022-11-09
Payer: COMMERCIAL

## 2022-11-09 PROCEDURE — 90686 FLU VACCINE (QUAD) GREATER THAN OR EQUAL TO 3YO PRESERVATIVE FREE IM: ICD-10-PCS | Mod: S$GLB,,, | Performed by: INTERNAL MEDICINE

## 2022-11-09 PROCEDURE — 90686 IIV4 VACC NO PRSV 0.5 ML IM: CPT | Mod: S$GLB,,, | Performed by: INTERNAL MEDICINE

## 2022-11-09 PROCEDURE — 90471 FLU VACCINE (QUAD) GREATER THAN OR EQUAL TO 3YO PRESERVATIVE FREE IM: ICD-10-PCS | Mod: S$GLB,,, | Performed by: INTERNAL MEDICINE

## 2022-11-09 PROCEDURE — 90471 IMMUNIZATION ADMIN: CPT | Mod: S$GLB,,, | Performed by: INTERNAL MEDICINE

## 2022-12-02 DIAGNOSIS — H66.002 ACUTE SUPPURATIVE OTITIS MEDIA OF LEFT EAR WITHOUT SPONTANEOUS RUPTURE OF TYMPANIC MEMBRANE, RECURRENCE NOT SPECIFIED: ICD-10-CM

## 2022-12-02 DIAGNOSIS — J30.9 ALLERGIC RHINITIS, UNSPECIFIED SEASONALITY, UNSPECIFIED TRIGGER: ICD-10-CM

## 2022-12-02 DIAGNOSIS — J31.0 CHRONIC RHINITIS: ICD-10-CM

## 2022-12-02 DIAGNOSIS — M25.569 KNEE PAIN, UNSPECIFIED CHRONICITY, UNSPECIFIED LATERALITY: ICD-10-CM

## 2022-12-02 RX ORDER — AZELASTINE 1 MG/ML
1 SPRAY, METERED NASAL 2 TIMES DAILY
Qty: 30 ML | Refills: 1 | Status: SHIPPED | OUTPATIENT
Start: 2022-12-02

## 2022-12-02 RX ORDER — IBUPROFEN 600 MG/1
600 TABLET ORAL EVERY 6 HOURS
Qty: 90 TABLET | Refills: 1 | Status: SHIPPED | OUTPATIENT
Start: 2022-12-02 | End: 2023-05-30 | Stop reason: SDUPTHER

## 2022-12-02 RX ORDER — MONTELUKAST SODIUM 10 MG/1
10 TABLET ORAL NIGHTLY
Qty: 90 TABLET | Refills: 2 | Status: SHIPPED | OUTPATIENT
Start: 2022-12-02 | End: 2023-03-02

## 2023-02-25 ENCOUNTER — OFFICE VISIT (OUTPATIENT)
Dept: URGENT CARE | Facility: CLINIC | Age: 38
End: 2023-02-25
Payer: COMMERCIAL

## 2023-02-25 VITALS
TEMPERATURE: 98 F | HEIGHT: 69 IN | OXYGEN SATURATION: 100 % | WEIGHT: 171.06 LBS | SYSTOLIC BLOOD PRESSURE: 126 MMHG | RESPIRATION RATE: 19 BRPM | HEART RATE: 80 BPM | BODY MASS INDEX: 25.34 KG/M2 | DIASTOLIC BLOOD PRESSURE: 67 MMHG

## 2023-02-25 DIAGNOSIS — J02.9 SORE THROAT: Primary | ICD-10-CM

## 2023-02-25 DIAGNOSIS — H69.93 DYSFUNCTION OF BOTH EUSTACHIAN TUBES: ICD-10-CM

## 2023-02-25 DIAGNOSIS — D57.20 SICKLE CELL-HEMOGLOBIN C DISEASE WITHOUT CRISIS: ICD-10-CM

## 2023-02-25 DIAGNOSIS — B96.89 BACTERIAL URI: ICD-10-CM

## 2023-02-25 DIAGNOSIS — J06.9 BACTERIAL URI: ICD-10-CM

## 2023-02-25 LAB
CTP QC/QA: YES
SARS-COV-2 AG RESP QL IA.RAPID: NEGATIVE

## 2023-02-25 PROCEDURE — 3078F DIAST BP <80 MM HG: CPT | Mod: CPTII,S$GLB,, | Performed by: FAMILY MEDICINE

## 2023-02-25 PROCEDURE — 3008F PR BODY MASS INDEX (BMI) DOCUMENTED: ICD-10-PCS | Mod: CPTII,S$GLB,, | Performed by: FAMILY MEDICINE

## 2023-02-25 PROCEDURE — 87811 SARS-COV-2 COVID19 W/OPTIC: CPT | Mod: QW,S$GLB,, | Performed by: FAMILY MEDICINE

## 2023-02-25 PROCEDURE — 3008F BODY MASS INDEX DOCD: CPT | Mod: CPTII,S$GLB,, | Performed by: FAMILY MEDICINE

## 2023-02-25 PROCEDURE — 3074F PR MOST RECENT SYSTOLIC BLOOD PRESSURE < 130 MM HG: ICD-10-PCS | Mod: CPTII,S$GLB,, | Performed by: FAMILY MEDICINE

## 2023-02-25 PROCEDURE — 1159F MED LIST DOCD IN RCRD: CPT | Mod: CPTII,S$GLB,, | Performed by: FAMILY MEDICINE

## 2023-02-25 PROCEDURE — 87811 SARS CORONAVIRUS 2 ANTIGEN POCT, MANUAL READ: ICD-10-PCS | Mod: QW,S$GLB,, | Performed by: FAMILY MEDICINE

## 2023-02-25 PROCEDURE — 1160F RVW MEDS BY RX/DR IN RCRD: CPT | Mod: CPTII,S$GLB,, | Performed by: FAMILY MEDICINE

## 2023-02-25 PROCEDURE — 99214 PR OFFICE/OUTPT VISIT, EST, LEVL IV, 30-39 MIN: ICD-10-PCS | Mod: S$GLB,,, | Performed by: FAMILY MEDICINE

## 2023-02-25 PROCEDURE — 99214 OFFICE O/P EST MOD 30 MIN: CPT | Mod: S$GLB,,, | Performed by: FAMILY MEDICINE

## 2023-02-25 PROCEDURE — 1160F PR REVIEW ALL MEDS BY PRESCRIBER/CLIN PHARMACIST DOCUMENTED: ICD-10-PCS | Mod: CPTII,S$GLB,, | Performed by: FAMILY MEDICINE

## 2023-02-25 PROCEDURE — 1159F PR MEDICATION LIST DOCUMENTED IN MEDICAL RECORD: ICD-10-PCS | Mod: CPTII,S$GLB,, | Performed by: FAMILY MEDICINE

## 2023-02-25 PROCEDURE — 3074F SYST BP LT 130 MM HG: CPT | Mod: CPTII,S$GLB,, | Performed by: FAMILY MEDICINE

## 2023-02-25 PROCEDURE — 3078F PR MOST RECENT DIASTOLIC BLOOD PRESSURE < 80 MM HG: ICD-10-PCS | Mod: CPTII,S$GLB,, | Performed by: FAMILY MEDICINE

## 2023-02-25 RX ORDER — FOLIC ACID 1 MG/1
1 TABLET ORAL DAILY
COMMUNITY

## 2023-02-25 RX ORDER — METHYLPREDNISOLONE 4 MG/1
TABLET ORAL
Qty: 1 EACH | Refills: 0 | Status: SHIPPED | OUTPATIENT
Start: 2023-02-25 | End: 2023-02-25

## 2023-02-25 RX ORDER — AMOXICILLIN AND CLAVULANATE POTASSIUM 875; 125 MG/1; MG/1
1 TABLET, FILM COATED ORAL EVERY 12 HOURS
Qty: 20 TABLET | Refills: 0 | Status: SHIPPED | OUTPATIENT
Start: 2023-02-25 | End: 2023-03-07

## 2023-02-25 NOTE — PATIENT INSTRUCTIONS
Below are suggestions for symptomatic relief of your upper respiratory symptoms:              -Salt water gargles to soothe throat pain.              -Chloroseptic spray and Cepacol lozenges also help to numb throat pain.              -Warm herbal teas with honey/lemon/rosetta can help soothe sore throat and hoarseness              -Nasal saline spray reduces inflammation and dryness.              -Warm face compresses to help with facial sinus pain/pressure.              -Humidifiers and steam can help with nasal dryness and congestion              -Vicks vapor rub at night for chest congestion.              -Flonase OTC or Nasacort OTC for nasal congestion and post-nasal drip. Ok to use twice daily for the first week, then reduce to once daily after symptoms have begun to improve.              -Afrin is a nasal spray that can give immediate relief of nasal congestion but you cannot use this medication for more than 3 days              -Simple foods like chicken noodle soup.              - Mucinex for congestion or Mucinex DM for cough during the day time. Delsym helps with coughing at night. Mucinex-D if you have sinus pressure/sinus pain or chest congestion. (caution if history of high blood pressure or palpitations). You must increase your water intake when using expectorants (Mucinex).             -Zyrtec/Claritin/Allegra/Xyzal should help with allergies.  -If you DO NOT have Hypertension or any history of palpitations, it is ok to take over the counter Sudafed or Mucinex D or Allegra-D or Claritin-D or Zyrtec-D.  -If you do take one of the above, it is ok to combine that with plain over the counter Mucinex or Allegra or Claritin or Zyrtec. If, for example, you are taking Zyrtec -D, you can combine that with Mucinex, but not Mucinex-D.  If you are taking Mucinex-D, you can combine that with plain Allegra or Claritin or Zyrtec.   -If you DO have Hypertension or palpitations, it is safe to take Coricidin HBP for  relief of sinus symptoms.     May follow up with your ENT

## 2023-02-25 NOTE — PROGRESS NOTES
"Subjective:       Patient ID: Gautam Sotomayor is a 37 y.o. female.    Vitals:  height is 5' 9" (1.753 m) and weight is 77.6 kg (171 lb 1.2 oz). Her oral temperature is 97.7 °F (36.5 °C). Her blood pressure is 126/67 and her pulse is 80. Her respiration is 19 and oxygen saturation is 100%.     Chief Complaint: Sore Throat    Patient reports to the clinic with the compliant of a sore throat that presented 2 weeks ago, she reports with taking Motrin as well as Flonase and otc oral decongestants, however she reports minimal relief.      Sore Throat   This is a new problem. The current episode started 1 to 4 weeks ago. The problem has been unchanged. Neither side of throat is experiencing more pain than the other. There has been no fever. The pain is at a severity of 7/10. The pain is moderate. Associated symptoms include headaches, a hoarse voice, a plugged ear sensation, neck pain and trouble swallowing. She has had no exposure to strep or mono. She has tried NSAIDs and gargles for the symptoms. The treatment provided no relief.     HENT:  Positive for sore throat and trouble swallowing.    Neck: Positive for neck pain.   Neurological:  Positive for headaches.     Objective:      Vitals:    02/25/23 1335   BP: 126/67   Pulse: 80   Resp: 19   Temp: 97.7 °F (36.5 °C)   TempSrc: Oral   SpO2: 100%   Weight: 77.6 kg (171 lb 1.2 oz)   Height: 5' 9" (1.753 m)      Physical Exam   Constitutional: She is oriented to person, place, and time. She appears well-developed. She is cooperative.  Non-toxic appearance. She does not appear ill. No distress.   HENT:   Head: Normocephalic and atraumatic.      Comments: Sinus tenderness  Ears:   Right Ear: Hearing, tympanic membrane, external ear and ear canal normal.   Left Ear: Hearing, tympanic membrane, external ear and ear canal normal.   Nose: Congestion present. No mucosal edema, rhinorrhea or nasal deformity. No epistaxis. Right sinus exhibits no maxillary sinus tenderness and " no frontal sinus tenderness. Left sinus exhibits no maxillary sinus tenderness and no frontal sinus tenderness.   Mouth/Throat: Uvula is midline, oropharynx is clear and moist and mucous membranes are normal. No trismus in the jaw. Normal dentition. No uvula swelling. No oropharyngeal exudate, posterior oropharyngeal edema or posterior oropharyngeal erythema.   Eyes: Conjunctivae and lids are normal. No scleral icterus.   Neck: Trachea normal and phonation normal. Neck supple. No edema present. No erythema present. No neck rigidity present.   Cardiovascular: Normal rate, regular rhythm, normal heart sounds and normal pulses.   Pulmonary/Chest: Effort normal and breath sounds normal. No respiratory distress. She has no decreased breath sounds. She has no rhonchi.   Abdominal: Normal appearance and bowel sounds are normal. Soft.   Musculoskeletal: Normal range of motion.         General: Normal range of motion.   Lymphadenopathy:     She has cervical adenopathy.   Neurological: no focal deficit. She is alert and oriented to person, place, and time. She exhibits normal muscle tone.   Skin: Skin is warm, intact and not diaphoretic.   Psychiatric: Her speech is normal and behavior is normal. Judgment and thought content normal.   Nursing note and vitals reviewed.      Results for orders placed or performed in visit on 02/25/23   SARS Coronavirus 2 Antigen, POCT Manual Read   Result Value Ref Range    SARS Coronavirus 2 Antigen Negative Negative     Acceptable Yes       Assessment:       1. Sore throat    2. Bacterial URI    3. Dysfunction of both eustachian tubes    4. 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 Bacterial URI  -     amoxicillin-clavulanate 875-125mg (AUGMENTIN) 875-125 mg per tablet; Take 1 tablet by mouth every 12 (twelve) hours. May take with yogurt or probiotic for  10 days  Dispense: 20 tablet; Refill: 0  May use NSAIDs.  Allergy to steroid prednisone    3. Dysfunction of both eustachian tubes  Last saw ENT provider on 10/3/22. Continue nasal spray regimen, antihistamine regimen.    4. 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 Patient reports onset of sickle cell crisis with oral prednisone in the past. I have reviewed medical records on the event.    This was a 35 minute visit, for which 20 mins were spent reviewing the patient's previous visits, labs and images to look for any trends and/or previous treatments.      Patient Instructions   Below are suggestions for symptomatic relief of your upper respiratory symptoms:              -Salt water gargles to soothe throat pain.              -Chloroseptic spray and Cepacol lozenges also help to numb throat pain.              -Warm herbal teas with honey/lemon/rosetta can help soothe sore throat and hoarseness              -Nasal saline spray reduces inflammation and dryness.              -Warm face compresses to help with facial sinus pain/pressure.              -Humidifiers and steam can help with nasal dryness and congestion              -Vicks vapor rub at night for chest congestion.              -Flonase OTC or Nasacort OTC for nasal congestion and post-nasal drip. Ok to use twice daily for the first week, then reduce to once daily after symptoms have begun to improve.              -Afrin is a nasal spray that can give immediate relief of nasal congestion but you cannot use this medication for more than 3 days              -Simple foods like chicken noodle soup.              - Mucinex for congestion or Mucinex DM for cough during the day time. Delsym helps with coughing at night. Mucinex-D if you have sinus pressure/sinus pain or chest congestion. (caution if history of high blood pressure or palpitations). You must increase your  water intake when using expectorants (Mucinex).             -Zyrtec/Claritin/Allegra/Xyzal should help with allergies.  -If you DO NOT have Hypertension or any history of palpitations, it is ok to take over the counter Sudafed or Mucinex D or Allegra-D or Claritin-D or Zyrtec-D.  -If you do take one of the above, it is ok to combine that with plain over the counter Mucinex or Allegra or Claritin or Zyrtec. If, for example, you are taking Zyrtec -D, you can combine that with Mucinex, but not Mucinex-D.  If you are taking Mucinex-D, you can combine that with plain Allegra or Claritin or Zyrtec.   -If you DO have Hypertension or palpitations, it is safe to take Coricidin HBP for relief of sinus symptoms.     May follow up with your ENT

## 2023-03-09 ENCOUNTER — PATIENT MESSAGE (OUTPATIENT)
Dept: RESEARCH | Facility: HOSPITAL | Age: 38
End: 2023-03-09
Payer: COMMERCIAL

## 2023-03-23 ENCOUNTER — OFFICE VISIT (OUTPATIENT)
Dept: OPTOMETRY | Facility: CLINIC | Age: 38
End: 2023-03-23
Payer: COMMERCIAL

## 2023-03-23 DIAGNOSIS — H16.001 CORNEAL ULCER OF RIGHT EYE: Primary | ICD-10-CM

## 2023-03-23 PROCEDURE — 92012 INTRM OPH EXAM EST PATIENT: CPT | Mod: S$GLB,,, | Performed by: OPTOMETRIST

## 2023-03-23 PROCEDURE — 99999 PR PBB SHADOW E&M-EST. PATIENT-LVL III: ICD-10-PCS | Mod: PBBFAC,,, | Performed by: OPTOMETRIST

## 2023-03-23 PROCEDURE — 92012 PR EYE EXAM, EST PATIENT,INTERMED: ICD-10-PCS | Mod: S$GLB,,, | Performed by: OPTOMETRIST

## 2023-03-23 PROCEDURE — 99999 PR PBB SHADOW E&M-EST. PATIENT-LVL III: CPT | Mod: PBBFAC,,, | Performed by: OPTOMETRIST

## 2023-03-23 RX ORDER — MOXIFLOXACIN 5 MG/ML
SOLUTION/ DROPS OPHTHALMIC
Qty: 3 ML | Refills: 2 | Status: SHIPPED | OUTPATIENT
Start: 2023-03-23

## 2023-03-23 RX ORDER — BACITRACIN ZINC AND POLYMYXIN B SULFATE 500; 10000 [USP'U]/G; [USP'U]/G
OINTMENT OPHTHALMIC
Qty: 3.5 G | Refills: 1 | Status: SHIPPED | OUTPATIENT
Start: 2023-03-23 | End: 2023-05-30

## 2023-03-23 NOTE — PATIENT INSTRUCTIONS
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  Discontinue contact lens wear, at least temporarily.    Prescribed Moxifloxicin ophthalmic solution for instillation into the right eye every two hours while awake until seen again.  Prescribed Polysporin ophthalmic ointment for instillation into the right eye at bedtime every night until seen again.    Return 03/27/2023 for follow-up check.

## 2023-03-23 NOTE — PROGRESS NOTES
"HPI     Eye Problem            Comments: SCL wearer.  Eyes burn in the morning upon awakening.  Symptoms worse with CLs in than without lenses, and worse in the right eye   than in the left eye.  Notes 'spot" on the [right] eye.  Does have glasses that she wears when without CLs.            Comments    Patient's age: 37 y.o. 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 Yes    Any problem with VA with glasses?  No   Wears CLs?:  Yes            If yes:              Type of CL worn:  States she is wearing Acuvue Oasys SCLs   prescribed by OD at [facility].                      Last CL Rx:  Acuvue Oasys OD  8.4   14.0  -2.75                                                                  OS 8.4     14.0   -2.75                                              Wears full-time or part-time:  Full time                Sleeps with contact lenses:  No               How often replace CLs: monthly              Any problem with VA with CLs?  Vision not clear  -  eye   irritation/burning - worse with CLs in than without CLs.  Worse in the   right eye than in the left eye.         Headaches?  No  Eye pain/discomfort?  Takes 15 min to open her eyes  in the morning   without the eyes burning - they are burning for the past two weeks (OD >   OS)                                                                                      Flashes?  No  Floaters?  Yes   Diplopia/Double vision?  No  Patient's Ocular History:         Any eye surgeries? No         Any eye injury?  No         Any treatment for eye disease?  ? 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    Family history of eye disease?  No  Significant patient medical history:         1. Diabetes?  No       If yes, IDDM or NIDDM? n/a   2. HBP?  No              3. 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 OTC eyedrops currently using: optic free    ! Prescription eye meds currently using:  No   ! Any history of allergy/adverse reaction to any eye meds used   previously?  No    ! Any history of allergy/adverse reaction to eyedrops used during prior   eye exam(s)? No    ! Any history of allergy/adverse reaction to Novacaine or similar meds?   No   ! Any history of allergy/adverse reaction to Epinephrine or similar meds?   No    ! Patient okay with use of anesthetic eyedrops to check eye pressure?    yes        ! Patient okay with use of eyedrops to dilate pupils today?  yes   !  Allergies/Medications/Medical History/Family History reviewed today?    Yes      ALLERGIC TO PREDNISONE AND DOXYCYLINE      PD =   65/61                                                         Last edited by Jaylon Crisostomo, OD on 3/23/2023 11:28 AM.            Assessment /Plan     For exam results, see Encounter Report.    Corneal ulcer of right eye  -     bacitracin-polymyxin b (POLYSPORIN) ophthalmic ointment; Instill into the right eye at bedtime, as directed  Dispense: 3.5 g; Refill: 1    Other orders  -     moxifloxacin (VIGAMOX) 0.5 % ophthalmic solution; Instill one drop into the right eye every two hours while awake  Dispense: 3 mL; Refill: 2                 Soft contact lens wearer presents today with peripheral corneal ulcer of the right eye.  Discontinue contact lens wear, at least temporarily.    Prescribed Moxifloxicin ophthalmic solution for instillation into the right eye every two hours while awake until seen again.  Prescribed Polysporin ophthalmic ointment for instillation into the right eye at bedtime every night until seen again.    Return 03/27/2023 for follow-up check.

## 2023-03-24 DIAGNOSIS — A60.00 GENITAL HERPES SIMPLEX, UNSPECIFIED SITE: ICD-10-CM

## 2023-03-27 ENCOUNTER — OFFICE VISIT (OUTPATIENT)
Dept: OPTOMETRY | Facility: CLINIC | Age: 38
End: 2023-03-27
Payer: COMMERCIAL

## 2023-03-27 DIAGNOSIS — H16.001 CORNEAL ULCER OF RIGHT EYE: Primary | ICD-10-CM

## 2023-03-27 PROCEDURE — 1159F MED LIST DOCD IN RCRD: CPT | Mod: CPTII,S$GLB,, | Performed by: OPTOMETRIST

## 2023-03-27 PROCEDURE — 92012 PR EYE EXAM, EST PATIENT,INTERMED: ICD-10-PCS | Mod: S$GLB,,, | Performed by: OPTOMETRIST

## 2023-03-27 PROCEDURE — 92012 INTRM OPH EXAM EST PATIENT: CPT | Mod: S$GLB,,, | Performed by: OPTOMETRIST

## 2023-03-27 PROCEDURE — 99999 PR PBB SHADOW E&M-EST. PATIENT-LVL III: CPT | Mod: PBBFAC,,, | Performed by: OPTOMETRIST

## 2023-03-27 PROCEDURE — 1159F PR MEDICATION LIST DOCUMENTED IN MEDICAL RECORD: ICD-10-PCS | Mod: CPTII,S$GLB,, | Performed by: OPTOMETRIST

## 2023-03-27 PROCEDURE — 99999 PR PBB SHADOW E&M-EST. PATIENT-LVL III: ICD-10-PCS | Mod: PBBFAC,,, | Performed by: OPTOMETRIST

## 2023-03-27 RX ORDER — VALACYCLOVIR HYDROCHLORIDE 1 G/1
1000 TABLET, FILM COATED ORAL EVERY 12 HOURS
Qty: 30 TABLET | Status: SHIPPED | OUTPATIENT
Start: 2023-03-27 | End: 2023-05-30 | Stop reason: SDUPTHER

## 2023-03-27 NOTE — PROGRESS NOTES
HPI    Patient's age: 37 y.o. 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 Yes    Any problem with VA with glasses?  No   Wears CLs?:  Yes            If yes:              Type of CL worn:  States she is wearing Acuvue Oasys SCLs   prescribed by OD at [facility].                      Last CL Rx:  Acuvue Oasys OD  8.4   14.0  -2.75                                                                  OS 8.4     14.0   -2.75                                              Wears full-time or part-time:  Full time                Sleeps with contact lenses:  No               How often replace CLs: monthly              Any problem with VA with CLs?  no        Headaches?  No  Eye pain/discomfort?  Patient feel like everything is the same and feel   like something in her eyes                                                                                     Flashes?  No  Floaters?  Yes   Diplopia/Double vision?  No  Patient's Ocular History:         Any eye surgeries? No         Any eye injury?  No         Any treatment for eye disease?  ? Sickle cell - like retinal   changes - previously followed by Dr. Ramachandran at [facility] in [place], [place]  -   s/p spleenectomy.    Family history of eye disease?  No  Significant patient medical history:         1. Diabetes?  No       If yes, IDDM or NIDDM? n/a   2. HBP?  No              3. 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 OTC eyedrops currently using: optic free    ! Prescription eye meds currently using:  moxifloxicin  and polysporin    ! Any history of allergy/adverse reaction to any eye meds used   previously?  No    ! Any history of allergy/adverse reaction to eyedrops used during prior   eye exam(s)? No    ! Any history of allergy/adverse reaction to Novacaine or similar meds?   No   ! Any history of allergy/adverse reaction to Epinephrine or  similar meds?   No    ! Patient okay with use of anesthetic eyedrops to check eye pressure?    yes        ! Patient okay with use of eyedrops to dilate pupils today?  yes   !  Allergies/Medications/Medical History/Family History reviewed today?    Yes      ALLERGIC TO PREDNISONE AND DOXYCYLINE      PD =   65/61                                                 Last edited by Tavon Arora MA on 3/27/2023  5:07 PM.            Assessment /Plan     For exam results, see Encounter Report.    1. Corneal ulcer of right eye                       Soft contact lens wearer in today for follow-up on peripheral corneal ulcer near temporal limbus in the right eye.    Ms. Sotomayro has been using Moxifloxacin ophthalmic solution in the right eye every two hours while awake and has been using Polysporin ophthalmic ointment at bedtime in the right eye since last visit    Slit lamp examination reveals persistent corneal infiltrate with small epithelial defect, as noted on last visit.    Discontinue use of moxifloxacin drops in OD.  Issued sample of Tobramycin ophthalmic solution for use in OD every two hours while awake.  Continue to instill polysporin ophthalmic ointment into OD every evening at bedtime.  Return in approx three to seven days for recheck.  Remain out of CLs until cornea of the right eye has healed.

## 2023-03-27 NOTE — PATIENT INSTRUCTIONS
Soft contact lens wearer in today for follow-up on peripheral corneal ulcer near temporal limbus in the right eye.    Ms. Sotomayor has been using Moxifloxacin ophthalmic solution in the right eye every two hours while awake and has been using Polysporin ophthalmic ointment at bedtime in the right eye since last visit    Slit lamp examination reveals persistent corneal infiltrate with small epithelial defect, as noted on last visit.    Discontinue use of moxifloxacin drops in OD.  Issued sample of Tobramycin ophthalmic solution for use in OD every two hours while awake.  Continue to instill polysporin ophthalmic ointment into OD every evening at bedtime.  Return in approx three to seven days for recheck.  Remain out of CLs until cornea of the right eye has healed.

## 2023-03-28 ENCOUNTER — PATIENT MESSAGE (OUTPATIENT)
Dept: OPTOMETRY | Facility: CLINIC | Age: 38
End: 2023-03-28
Payer: COMMERCIAL

## 2023-04-03 ENCOUNTER — OFFICE VISIT (OUTPATIENT)
Dept: OPTOMETRY | Facility: CLINIC | Age: 38
End: 2023-04-03
Payer: COMMERCIAL

## 2023-04-03 DIAGNOSIS — H16.001 CORNEAL ULCER OF RIGHT EYE: Primary | ICD-10-CM

## 2023-04-03 PROCEDURE — 99999 PR PBB SHADOW E&M-EST. PATIENT-LVL III: ICD-10-PCS | Mod: PBBFAC,,, | Performed by: OPTOMETRIST

## 2023-04-03 PROCEDURE — 92012 PR EYE EXAM, EST PATIENT,INTERMED: ICD-10-PCS | Mod: S$GLB,,, | Performed by: OPTOMETRIST

## 2023-04-03 PROCEDURE — 1159F PR MEDICATION LIST DOCUMENTED IN MEDICAL RECORD: ICD-10-PCS | Mod: CPTII,S$GLB,, | Performed by: OPTOMETRIST

## 2023-04-03 PROCEDURE — 92012 INTRM OPH EXAM EST PATIENT: CPT | Mod: S$GLB,,, | Performed by: OPTOMETRIST

## 2023-04-03 PROCEDURE — 99999 PR PBB SHADOW E&M-EST. PATIENT-LVL III: CPT | Mod: PBBFAC,,, | Performed by: OPTOMETRIST

## 2023-04-03 PROCEDURE — 1159F MED LIST DOCD IN RCRD: CPT | Mod: CPTII,S$GLB,, | Performed by: OPTOMETRIST

## 2023-04-03 NOTE — PROGRESS NOTES
HPI     Eye Problem            Comments: Ms. Sotomayor in today for follow up on peripheral corneal ulcer   of the right eye.  Has not been wearing SCLs.  Has been instilling one drop of tobramycin ophthalmic solution into the   right eye every two hours while awake and Polysporin ophthalmic ointment   into the right eye at bedtime since last visit.  She reports right eye does feel better but is still slightly bothersome.           Comments    Patient's age: 37 y.o. 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 Yes    Any problem with VA with glasses?  No   Wears CLs?:  Yes            If yes:              Type of CL worn:  States she is wearing Acuvue Oasys SCLs   prescribed by OD at [facility].                      Last CL Rx:  Acuvue Oasys OD  8.4   14.0  -2.75                                                                  OS 8.4     14.0   -2.75                                              Wears full-time or part-time:  Full time                Sleeps with contact lenses:  No               How often replace CLs: monthly              Any problem with VA with CLs?  no        Headaches?  No  Eye pain/discomfort?  Patient feel like everything is the same and feel   like something in her eyes                                                                                     Flashes?  No  Floaters?  Yes   Diplopia/Double vision?  No  Patient's Ocular History:         Any eye surgeries? No         Any eye injury?  No         Any treatment for eye disease?  ? Sickle cell - like retinal   changes - previously followed by Dr. Ramachandran at [facility] in [place], [place]  -   s/p spleenectomy.    Family history of eye disease?  No  Significant patient medical history:         1. Diabetes?  No       If yes, IDDM or NIDDM? n/a   2. HBP?  No              3. 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 OTC eyedrops currently using: optic free    ! Prescription eye meds currently using:  moxifloxicin  and polysporin    ! Any history of allergy/adverse reaction to any eye meds used   previously?  No    ! Any history of allergy/adverse reaction to eyedrops used during prior   eye exam(s)? No    ! Any history of allergy/adverse reaction to Novacaine or similar meds?   No   ! Any history of allergy/adverse reaction to Epinephrine or similar meds?   No    ! Patient okay with use of anesthetic eyedrops to check eye pressure?    yes        ! Patient okay with use of eyedrops to dilate pupils today?  yes   !  Allergies/Medications/Medical History/Family History reviewed today?    Yes      ALLERGIC TO PREDNISONE AND DOXYCYLINE      PD =   65/61                                                         Last edited by Jaylon Crisostomo, NATHAN on 4/3/2023  5:59 PM.            Assessment /Plan     For exam results, see Encounter Report.    1. Corneal ulcer of right eye                       Soft contact lens wearer in today for follow-up on peripheral corneal ulcer near temporal limbus in the right eye.     Ms. Sotomayor has been using Tobrmycin ophthalmic solution in the right eye every two hours while awake and has been using Polysporin ophthalmic ointment at bedtime in the right eye since last visit     Slit lamp examination reveals corneal ulcer virtrually healed in the right eye.  Infiltate clearing with only minimal overlying epithelial corneal defect.    Remain out of CL in both eyes.  Continue to instill Tobramycin ophthalmic solution into OD four times per day until seen again.  Continue to instill polysporin ophthalmic ointment into OD every evening at bedtime.  Return in seven days for recheck.

## 2023-04-03 NOTE — PATIENT INSTRUCTIONS
Soft contact lens wearer in today for follow-up on peripheral corneal ulcer near temporal limbus in the right eye.     Ms. Sotomayor has been using Tobrmycin ophthalmic solution in the right eye every two hours while awake and has been using Polysporin ophthalmic ointment at bedtime in the right eye since last visit     Slit lamp examination reveals corneal ulcer virtrually healed in the right eye.  Infiltate clearing with only minimal overlying epithelial corneal defect.    Remain out of CL in both eyes.  Continue to instill Tobramycin ophthalmic solution into OD four times per day until seen again.  Continue to instill polysporin ophthalmic ointment into OD every evening at bedtime.  Return in seven days for recheck.

## 2023-04-10 ENCOUNTER — OFFICE VISIT (OUTPATIENT)
Dept: OPTOMETRY | Facility: CLINIC | Age: 38
End: 2023-04-10
Payer: COMMERCIAL

## 2023-04-10 DIAGNOSIS — H16.001 CORNEAL ULCER OF RIGHT EYE: Primary | ICD-10-CM

## 2023-04-10 PROCEDURE — 99499 NO LOS: ICD-10-PCS | Mod: S$GLB,,, | Performed by: OPTOMETRIST

## 2023-04-10 PROCEDURE — 99999 PR PBB SHADOW E&M-EST. PATIENT-LVL III: ICD-10-PCS | Mod: PBBFAC,,, | Performed by: OPTOMETRIST

## 2023-04-10 PROCEDURE — 1159F MED LIST DOCD IN RCRD: CPT | Mod: CPTII,S$GLB,, | Performed by: OPTOMETRIST

## 2023-04-10 PROCEDURE — 1159F PR MEDICATION LIST DOCUMENTED IN MEDICAL RECORD: ICD-10-PCS | Mod: CPTII,S$GLB,, | Performed by: OPTOMETRIST

## 2023-04-10 PROCEDURE — 99499 UNLISTED E&M SERVICE: CPT | Mod: S$GLB,,, | Performed by: OPTOMETRIST

## 2023-04-10 PROCEDURE — 99999 PR PBB SHADOW E&M-EST. PATIENT-LVL III: CPT | Mod: PBBFAC,,, | Performed by: OPTOMETRIST

## 2023-04-10 NOTE — PROGRESS NOTES
HPI     Eye Problem            Comments: In for follow-up on peripheral corneal ulcer in the right eye.   Currently using tobramycin ophthalmic solution four times per day in the   right eye, and Polysporin ophthalmic ointment in the right eye at bedtime.     Ms Sotomayor states that the right eye feels fine today          Last edited by Jaylon Crisostomo, OD on 4/10/2023  4:41 PM.            Assessment /Plan     For exam results, see Encounter Report.    1. Corneal ulcer of right eye                       Soft contact lens wearer in today for follow-up on peripheral corneal ulcer near temporal limbus in the right eye.     Ms. Sotomayor has been using Tobrmycin ophthalmic solution in the right eye for times per day while awake and has been using Polysporin ophthalmic ointment at bedtime in the right eye since last visit     Slit lamp examination reveals corneal ulcer has healed in the right eye.      Remain out of CL in both eyes.  Okay to discontinue Tobramycin ophthalmic solution and polysporin ophthalmic ointment in the right eye.   Return in seven days for recheck.    Will do refraction at that time, and will attempt a contact lens refitting in both eyes with a lens of different design and material.

## 2023-04-10 NOTE — PATIENT INSTRUCTIONS
Soft contact lens wearer in today for follow-up on peripheral corneal ulcer near temporal limbus in the right eye.     Ms. Sotomayor has been using Tobrmycin ophthalmic solution in the right eye for times per day while awake and has been using Polysporin ophthalmic ointment at bedtime in the right eye since last visit     Slit lamp examination reveals corneal ulcer has healed in the right eye.      Remain out of CL in both eyes.  Okay to discontinue Tobramycin ophthalmic solution and polysporin ophthalmic ointment in the right eye.   Return in seven days for recheck.    Will do refraction at that time, and will attempt a contact lens refitting in both eyes with a lens of different design and material.

## 2023-04-17 ENCOUNTER — OFFICE VISIT (OUTPATIENT)
Dept: OPTOMETRY | Facility: CLINIC | Age: 38
End: 2023-04-17
Payer: COMMERCIAL

## 2023-04-17 DIAGNOSIS — Z46.0 ENCOUNTER FOR FITTING OR ADJUSTMENT OF SPECTACLES OR CONTACT LENSES: Primary | ICD-10-CM

## 2023-04-17 PROCEDURE — 92310 PR CONTACT LENS FITTING (NO CHANGE): ICD-10-PCS | Mod: CSM,S$GLB,, | Performed by: OPTOMETRIST

## 2023-04-17 PROCEDURE — 99999 PR PBB SHADOW E&M-EST. PATIENT-LVL III: CPT | Mod: PBBFAC,,, | Performed by: OPTOMETRIST

## 2023-04-17 PROCEDURE — 1159F PR MEDICATION LIST DOCUMENTED IN MEDICAL RECORD: ICD-10-PCS | Mod: CPTII,S$GLB,, | Performed by: OPTOMETRIST

## 2023-04-17 PROCEDURE — 1159F MED LIST DOCD IN RCRD: CPT | Mod: CPTII,S$GLB,, | Performed by: OPTOMETRIST

## 2023-04-17 PROCEDURE — 99499 UNLISTED E&M SERVICE: CPT | Mod: ,,, | Performed by: OPTOMETRIST

## 2023-04-17 PROCEDURE — 99999 PR PBB SHADOW E&M-EST. PATIENT-LVL III: ICD-10-PCS | Mod: PBBFAC,,, | Performed by: OPTOMETRIST

## 2023-04-17 PROCEDURE — 99499 NO LOS: ICD-10-PCS | Mod: ,,, | Performed by: OPTOMETRIST

## 2023-04-17 PROCEDURE — 92310 CONTACT LENS FITTING OU: CPT | Mod: CSM,S$GLB,, | Performed by: OPTOMETRIST

## 2023-04-17 NOTE — PROGRESS NOTES
HPI    Patient's age: 37 y.o. 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   Wears glasses? Yes    Any problem with VA with glasses?  No   Wears CLs?:  Yes            If yes:              Type of CL worn:  States she is wearing Acuvue Oasys SCLs   prescribed by OD at [facility].                      Last CL Rx:  Acuvue Oasys OD  8.4   14.0  -2.75                                                                  OS 8.4     14.0   -2.75                                              Wears full-time or part-time:  Full time                Sleeps with contact lenses:  No               How often replace CLs: monthly              Any problem with VA with CLs?  no        Headaches?  No  Eye pain/discomfort?  no                                                                               Flashes?  No  Floaters?  No   Diplopia/Double vision?  No  Patient's Ocular History:         Any eye surgeries? No         Any eye injury?  No         Any treatment for eye disease?  ? Sickle cell - like retinal   changes - previously followed by Dr. Ramachandran at [facility] in [place], [place]  -   s/p spleenectomy.    Family history of eye disease?  No  Significant patient medical history:         1. Diabetes?  No       If yes, IDDM or NIDDM? n/a   2. HBP?  No              3. 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 OTC eyedrops currently using: optic free    ! Prescription eye meds currently using: no    ! Any history of allergy/adverse reaction to any eye meds used   previously?  No    ! Any history of allergy/adverse reaction to eyedrops used during prior   eye exam(s)? No    ! Any history of allergy/adverse reaction to Novacaine or similar meds?   No   ! Any history of allergy/adverse reaction to Epinephrine or similar meds?   No    ! Patient okay with use of anesthetic eyedrops to check eye pressure?    yes        !  Patient okay with use of eyedrops to dilate pupils today?  yes   !  Allergies/Medications/Medical History/Family History reviewed today?    Yes      ALLERGIC TO PREDNISONE AND DOXYCYLINE      PD =   65/61                                                 Last edited by Tavon Arora MA on 4/17/2023  4:40 PM.            Assessment /Plan     For exam results, see Encounter Report.    1. Encounter for fitting or adjustment of spectacles or contact lenses                   Ms. Sotomayor had been wearing Acuvue Oasys soft contact lenses in both eyes.  Recent history of peripheral corneal ulcer in the right eye, now resolved.  Ms. Sotomayor would like to resume CLs wear.  Suggested trial with lens of different design/material.    Good lens fit achieved with trial Air Optix Plus Hydraglyde soft CL in both eyes.     Dispensed trial Air Optix Plus Hydraglyde SCLs     OD 8.6  14.2  -2.75     OS  8.6  14.2  -2.75  Daily wear only.    Clean and soak lenses daily.  Return in one week for contact lens follow-up visit - or prior, if any problems noted in the interim.

## 2023-04-17 NOTE — PATIENT INSTRUCTIONS
Ms. Sotomayor had been wearing Acuvue Oasys soft contact lenses in both eyes.  Recent history of peripheral corneal ulcer in the right eye, now resolved.  Ms. Sotomayor would like to resume CLs wear.  Suggested trial with lens of different design/material.    Good lens fit achieved with trial Air Optix Plus Hydraglyde soft CL in both eyes.     Dispensed trial Air Optix Plus Hydraglyde SCLs     OD 8.6  14.2  -2.75     OS  8.6  14.2  -2.75  Daily wear only.    Clean and soak lenses daily.  Return in one week for contact lens follow-up visit - or prior, if any problems noted in the interim.

## 2023-04-20 ENCOUNTER — PATIENT MESSAGE (OUTPATIENT)
Dept: OPTOMETRY | Facility: CLINIC | Age: 38
End: 2023-04-20
Payer: COMMERCIAL

## 2023-04-24 ENCOUNTER — OFFICE VISIT (OUTPATIENT)
Dept: OPTOMETRY | Facility: CLINIC | Age: 38
End: 2023-04-24
Payer: COMMERCIAL

## 2023-04-24 DIAGNOSIS — Z46.0 ENCOUNTER FOR FITTING OR ADJUSTMENT OF SPECTACLES OR CONTACT LENSES: Primary | ICD-10-CM

## 2023-04-24 PROCEDURE — 99499 UNLISTED E&M SERVICE: CPT | Mod: S$GLB,,, | Performed by: OPTOMETRIST

## 2023-04-24 PROCEDURE — 92499 PR CONTACT LENS F/U LEV 1: ICD-10-PCS | Mod: S$GLB,,, | Performed by: OPTOMETRIST

## 2023-04-24 PROCEDURE — 99499 NO LOS: ICD-10-PCS | Mod: S$GLB,,, | Performed by: OPTOMETRIST

## 2023-04-24 PROCEDURE — 92499 UNLISTED OPH SVC/PROCEDURE: CPT | Mod: S$GLB,,, | Performed by: OPTOMETRIST

## 2023-04-24 PROCEDURE — 1159F PR MEDICATION LIST DOCUMENTED IN MEDICAL RECORD: ICD-10-PCS | Mod: CPTII,S$GLB,, | Performed by: OPTOMETRIST

## 2023-04-24 PROCEDURE — 1159F MED LIST DOCD IN RCRD: CPT | Mod: CPTII,S$GLB,, | Performed by: OPTOMETRIST

## 2023-04-25 NOTE — PROGRESS NOTES
HPI     Contact Lens Follow Up            Comments: In for contact lens follow-up.  Wearing trial Air Optix Plus Hydraglyde soft CLs:   8.6  14.2  -2.75 OU.  Doing fine with the new trial lenses - good lens comfort OD and OS.  Good   VA with CLs.           Comments    Patient in for contact lens follow-up.    Contact lenses patient currently wearing:  OD: Air Optix Plus Hydraglyde   8.6/14.2/-2.75 sphere                                                                     OS: Air   Optix Plus Hydraglyde 8.6/14.2/2.75 sphere    Patient's report on visual acuity with contact lenses:  Distance:  VA is   good, no eye pain                                                                                          Near: VA is good, no eye pain    Any problems with Contact Lens comfort?  None    Contact lens wearing time (hours/per day): 12-14 hours daily    How long have Contact Lenses been in today?  9-10 hours    Does patient sleep with the contact lenses in?  No    Contact lens care system/solution used?  OptiFree solution            Last edited by Jaylon Crisostomo, OD on 4/24/2023  5:08 PM.            Assessment /Plan     For exam results, see Encounter Report.    1. Encounter for fitting or adjustment of spectacles or contact lenses                     Ms. Sotomayor in today for contact lens follow-up visit with new trial Air Optix with Hydraglyde SCLs in both eyes.  Currently wearing -2.75 power in both eyes.  Good lens fit in each eye.  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  Otherwise, cornea clear in both eyes.    Spherical over-refraction done today indicates need for -0.25 D power change in the right lens.  Power of the left lens fine as is.  Discussed with Ms. Sotomayor,  She prefers to keep lens bertrand equal in the two eyes for convenience, and she is aware that the right lens is 0.25 D underpowered for full distance correction.  She is very  happy with binocular distance VA and binocular near VA with current lens bertrand.    New contact lens Rx issued for -2.75 power in both eyes.    Return when appropriate for annual general eye exam.  Will repeat CL follow-up visit at that time.- or return prior to that, if any problems noted in the interim.

## 2023-04-25 NOTE — PATIENT INSTRUCTIONS
Ms. Sotomayor in today for contact lens follow-up visit with new trial Air Optix with Hydraglyde SCLs in both eyes.  Currently wearing -2.75 power in both eyes.  Good lens fit in each eye.  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  Otherwise, cornea clear in both eyes.    Spherical over-refraction done today indicates need for -0.25 D power change in the right lens.  Power of the left lens fine as is.  Discussed with Ms. Sotomayor,  She prefers to keep lens bertrand equal in the two eyes for convenience, and she is aware that the right lens is 0.25 D underpowered for full distance correction.  She is very happy with binocular distance VA and binocular near VA with current lens bertrand.    New contact lens Rx issued for -2.75 power in both eyes.    Return when appropriate for annual general eye exam.  Will repeat CL follow-up visit at that time.- or return prior to that, if any problems noted in the interim.

## 2023-04-27 ENCOUNTER — PATIENT MESSAGE (OUTPATIENT)
Dept: OPTOMETRY | Facility: CLINIC | Age: 38
End: 2023-04-27
Payer: COMMERCIAL

## 2023-05-08 ENCOUNTER — PATIENT MESSAGE (OUTPATIENT)
Dept: PRIMARY CARE CLINIC | Facility: CLINIC | Age: 38
End: 2023-05-08

## 2023-05-08 ENCOUNTER — OFFICE VISIT (OUTPATIENT)
Dept: PRIMARY CARE CLINIC | Facility: CLINIC | Age: 38
End: 2023-05-08
Attending: FAMILY MEDICINE
Payer: COMMERCIAL

## 2023-05-08 DIAGNOSIS — H65.00 NON-RECURRENT ACUTE SEROUS OTITIS MEDIA, UNSPECIFIED LATERALITY: Primary | ICD-10-CM

## 2023-05-08 PROCEDURE — 99213 PR OFFICE/OUTPT VISIT, EST, LEVL III, 20-29 MIN: ICD-10-PCS | Mod: 95,,, | Performed by: FAMILY MEDICINE

## 2023-05-08 PROCEDURE — 99213 OFFICE O/P EST LOW 20 MIN: CPT | Mod: 95,,, | Performed by: FAMILY MEDICINE

## 2023-05-08 RX ORDER — CIPROFLOXACIN AND DEXAMETHASONE 3; 1 MG/ML; MG/ML
SUSPENSION/ DROPS AURICULAR (OTIC)
Qty: 7.5 ML | Refills: 0 | Status: SHIPPED | OUTPATIENT
Start: 2023-05-08 | End: 2023-11-15

## 2023-05-15 NOTE — PROGRESS NOTES
Subjective:       Patient ID: Gautam Sotomayor is a 38 y.o. female.    Chief Complaint: ear pain right    The patient location is: home  The chief complaint leading to consultation is: above    Visit type: audiovisual    Face to Face time with patient: 15 minutes (15 minutes of total time spent on the encounter, which includes face to face time and non-face to face time preparing to see the patient (eg, review of tests), Obtaining and/or reviewing separately obtained history, Documenting clinical information in the electronic or other health record, Independently interpreting results (not separately reported) and communicating results to the patient/family/caregiver, or Care coordination (not separately reported).     Each patient to whom he or she provides medical services by telemedicine is:  (1) informed of the relationship between the physician and patient and the respective role of any other health care provider with respect to management of the patient; and (2) notified that he or she may decline to receive medical services by telemedicine and may withdraw from such care at any time.    Notes:     Pt with ongoing allergies that she thinks that is leading to her right ear and is painful for patient    Otalgia   There is pain in the left ear. This is a recurrent problem. The current episode started in the past 7 days. The problem occurs every few hours. The problem has been gradually worsening. There has been no fever. The pain is at a severity of 4/10. The pain is mild. Associated symptoms include coughing, hearing loss, neck pain and rhinorrhea. Pertinent negatives include no abdominal pain, diarrhea, drainage, ear discharge, headaches, rash, sore throat or vomiting. She has tried NSAIDs for the symptoms. The treatment provided no relief. Her past medical history is significant for a chronic ear infection. There is no history of hearing loss or a tympanostomy tube.   Review of Systems   Constitutional:  Negative.    HENT:  Positive for ear pain, hearing loss and rhinorrhea. Negative for ear discharge and sore throat.    Eyes: Negative.    Respiratory:  Positive for cough. Negative for chest tightness and shortness of breath.    Cardiovascular:  Negative for chest pain, palpitations and leg swelling.   Gastrointestinal: Negative.  Negative for abdominal pain, diarrhea and vomiting.   Musculoskeletal:  Positive for neck pain. Negative for joint swelling.   Skin: Negative.  Negative for rash.   Neurological:  Negative for dizziness, weakness, light-headedness and headaches.   All other systems reviewed and are negative.      Objective:      Physical Exam  Vitals reviewed.   Constitutional:       General: She is not in acute distress.     Appearance: Normal appearance. She is well-developed and normal weight. She is not ill-appearing, toxic-appearing or diaphoretic.   HENT:      Head: Normocephalic and atraumatic.   Eyes:      Extraocular Movements: Extraocular movements intact.      Conjunctiva/sclera: Conjunctivae normal.      Pupils: Pupils are equal, round, and reactive to light.   Neck:      Thyroid: No thyromegaly.   Cardiovascular:      Rate and Rhythm: Normal rate and regular rhythm.      Heart sounds: Normal heart sounds. No murmur heard.  Pulmonary:      Effort: Pulmonary effort is normal. No respiratory distress.      Breath sounds: Normal breath sounds. No wheezing or rales.   Chest:      Chest wall: No tenderness.   Musculoskeletal:         General: Normal range of motion.      Cervical back: Normal range of motion and neck supple.      Right lower leg: No edema.      Left lower leg: No edema.   Lymphadenopathy:      Cervical: No cervical adenopathy.   Skin:     General: Skin is warm.      Findings: No erythema.   Neurological:      General: No focal deficit present.      Mental Status: She is alert and oriented to person, place, and time. Mental status is at baseline.   Psychiatric:         Mood and  Affect: Mood normal.         Behavior: Behavior normal.         Thought Content: Thought content normal.         Judgment: Judgment normal.       Assessment:       1. Non-recurrent acute serous otitis media, unspecified laterality        Plan:   1. Non-recurrent acute serous otitis media, unspecified laterality  -     ciprofloxacin-dexAMETHasone 0.3-0.1% (CIPRODEX) 0.3-0.1 % DrpS; Place 4 drops BID into affected ear x 10 days  Dispense: 7.5 mL; Refill: 0    Trial of drops to treat ear. Will ask that pt f/u if symptoms persist with her PCP

## 2023-05-16 ENCOUNTER — PATIENT MESSAGE (OUTPATIENT)
Dept: PRIMARY CARE CLINIC | Facility: CLINIC | Age: 38
End: 2023-05-16
Payer: COMMERCIAL

## 2023-05-16 DIAGNOSIS — J30.1 SEASONAL ALLERGIC RHINITIS DUE TO POLLEN: ICD-10-CM

## 2023-05-17 RX ORDER — LEVOCETIRIZINE DIHYDROCHLORIDE 5 MG/1
5 TABLET, FILM COATED ORAL DAILY
Qty: 90 TABLET | Refills: 3 | Status: SHIPPED | OUTPATIENT
Start: 2023-05-17

## 2023-05-30 ENCOUNTER — OFFICE VISIT (OUTPATIENT)
Dept: OBSTETRICS AND GYNECOLOGY | Facility: CLINIC | Age: 38
End: 2023-05-30
Attending: OBSTETRICS & GYNECOLOGY
Payer: COMMERCIAL

## 2023-05-30 ENCOUNTER — OFFICE VISIT (OUTPATIENT)
Dept: PRIMARY CARE CLINIC | Facility: CLINIC | Age: 38
End: 2023-05-30
Payer: COMMERCIAL

## 2023-05-30 VITALS
WEIGHT: 165.88 LBS | SYSTOLIC BLOOD PRESSURE: 122 MMHG | OXYGEN SATURATION: 100 % | HEIGHT: 69 IN | DIASTOLIC BLOOD PRESSURE: 58 MMHG | HEART RATE: 83 BPM | BODY MASS INDEX: 24.57 KG/M2

## 2023-05-30 VITALS
HEIGHT: 69 IN | DIASTOLIC BLOOD PRESSURE: 74 MMHG | SYSTOLIC BLOOD PRESSURE: 126 MMHG | WEIGHT: 166 LBS | BODY MASS INDEX: 24.59 KG/M2

## 2023-05-30 DIAGNOSIS — L43.9 LICHEN PLANUS: ICD-10-CM

## 2023-05-30 DIAGNOSIS — J30.1 SEASONAL ALLERGIC RHINITIS DUE TO POLLEN: ICD-10-CM

## 2023-05-30 DIAGNOSIS — Z01.419 WOMEN'S ANNUAL ROUTINE GYNECOLOGICAL EXAMINATION: Primary | ICD-10-CM

## 2023-05-30 DIAGNOSIS — Z12.4 PAP SMEAR FOR CERVICAL CANCER SCREENING: ICD-10-CM

## 2023-05-30 DIAGNOSIS — Z11.3 SCREEN FOR STD (SEXUALLY TRANSMITTED DISEASE): ICD-10-CM

## 2023-05-30 DIAGNOSIS — A60.00 GENITAL HERPES SIMPLEX, UNSPECIFIED SITE: ICD-10-CM

## 2023-05-30 DIAGNOSIS — Z00.00 ANNUAL PHYSICAL EXAM: Primary | ICD-10-CM

## 2023-05-30 DIAGNOSIS — D72.829 LEUKOCYTOSIS, UNSPECIFIED TYPE: ICD-10-CM

## 2023-05-30 DIAGNOSIS — M25.569 KNEE PAIN, UNSPECIFIED CHRONICITY, UNSPECIFIED LATERALITY: ICD-10-CM

## 2023-05-30 PROCEDURE — 3074F SYST BP LT 130 MM HG: CPT | Mod: CPTII,S$GLB,, | Performed by: FAMILY MEDICINE

## 2023-05-30 PROCEDURE — 99395 PREV VISIT EST AGE 18-39: CPT | Mod: S$GLB,,, | Performed by: OBSTETRICS & GYNECOLOGY

## 2023-05-30 PROCEDURE — 1159F MED LIST DOCD IN RCRD: CPT | Mod: CPTII,S$GLB,, | Performed by: OBSTETRICS & GYNECOLOGY

## 2023-05-30 PROCEDURE — 87624 HPV HI-RISK TYP POOLED RSLT: CPT | Performed by: OBSTETRICS & GYNECOLOGY

## 2023-05-30 PROCEDURE — 3074F SYST BP LT 130 MM HG: CPT | Mod: CPTII,S$GLB,, | Performed by: OBSTETRICS & GYNECOLOGY

## 2023-05-30 PROCEDURE — 3074F PR MOST RECENT SYSTOLIC BLOOD PRESSURE < 130 MM HG: ICD-10-PCS | Mod: CPTII,S$GLB,, | Performed by: FAMILY MEDICINE

## 2023-05-30 PROCEDURE — 99395 PR PREVENTIVE VISIT,EST,18-39: ICD-10-PCS | Mod: S$GLB,,, | Performed by: OBSTETRICS & GYNECOLOGY

## 2023-05-30 PROCEDURE — 3078F PR MOST RECENT DIASTOLIC BLOOD PRESSURE < 80 MM HG: ICD-10-PCS | Mod: CPTII,S$GLB,, | Performed by: OBSTETRICS & GYNECOLOGY

## 2023-05-30 PROCEDURE — 99999 PR PBB SHADOW E&M-EST. PATIENT-LVL III: ICD-10-PCS | Mod: PBBFAC,,, | Performed by: FAMILY MEDICINE

## 2023-05-30 PROCEDURE — 3008F BODY MASS INDEX DOCD: CPT | Mod: CPTII,S$GLB,, | Performed by: OBSTETRICS & GYNECOLOGY

## 2023-05-30 PROCEDURE — 3074F PR MOST RECENT SYSTOLIC BLOOD PRESSURE < 130 MM HG: ICD-10-PCS | Mod: CPTII,S$GLB,, | Performed by: OBSTETRICS & GYNECOLOGY

## 2023-05-30 PROCEDURE — 3078F DIAST BP <80 MM HG: CPT | Mod: CPTII,S$GLB,, | Performed by: FAMILY MEDICINE

## 2023-05-30 PROCEDURE — 1160F RVW MEDS BY RX/DR IN RCRD: CPT | Mod: CPTII,S$GLB,, | Performed by: FAMILY MEDICINE

## 2023-05-30 PROCEDURE — 99395 PREV VISIT EST AGE 18-39: CPT | Mod: S$GLB,,, | Performed by: FAMILY MEDICINE

## 2023-05-30 PROCEDURE — 3008F PR BODY MASS INDEX (BMI) DOCUMENTED: ICD-10-PCS | Mod: CPTII,S$GLB,, | Performed by: OBSTETRICS & GYNECOLOGY

## 2023-05-30 PROCEDURE — 1159F MED LIST DOCD IN RCRD: CPT | Mod: CPTII,S$GLB,, | Performed by: FAMILY MEDICINE

## 2023-05-30 PROCEDURE — 81514 NFCT DS BV&VAGINITIS DNA ALG: CPT | Performed by: FAMILY MEDICINE

## 2023-05-30 PROCEDURE — 99999 PR PBB SHADOW E&M-EST. PATIENT-LVL IV: ICD-10-PCS | Mod: PBBFAC,,, | Performed by: OBSTETRICS & GYNECOLOGY

## 2023-05-30 PROCEDURE — 99395 PR PREVENTIVE VISIT,EST,18-39: ICD-10-PCS | Mod: S$GLB,,, | Performed by: FAMILY MEDICINE

## 2023-05-30 PROCEDURE — 1160F PR REVIEW ALL MEDS BY PRESCRIBER/CLIN PHARMACIST DOCUMENTED: ICD-10-PCS | Mod: CPTII,S$GLB,, | Performed by: FAMILY MEDICINE

## 2023-05-30 PROCEDURE — 1160F RVW MEDS BY RX/DR IN RCRD: CPT | Mod: CPTII,S$GLB,, | Performed by: OBSTETRICS & GYNECOLOGY

## 2023-05-30 PROCEDURE — 1159F PR MEDICATION LIST DOCUMENTED IN MEDICAL RECORD: ICD-10-PCS | Mod: CPTII,S$GLB,, | Performed by: FAMILY MEDICINE

## 2023-05-30 PROCEDURE — 1160F PR REVIEW ALL MEDS BY PRESCRIBER/CLIN PHARMACIST DOCUMENTED: ICD-10-PCS | Mod: CPTII,S$GLB,, | Performed by: OBSTETRICS & GYNECOLOGY

## 2023-05-30 PROCEDURE — 1159F PR MEDICATION LIST DOCUMENTED IN MEDICAL RECORD: ICD-10-PCS | Mod: CPTII,S$GLB,, | Performed by: OBSTETRICS & GYNECOLOGY

## 2023-05-30 PROCEDURE — 99999 PR PBB SHADOW E&M-EST. PATIENT-LVL IV: CPT | Mod: PBBFAC,,, | Performed by: OBSTETRICS & GYNECOLOGY

## 2023-05-30 PROCEDURE — 99999 PR PBB SHADOW E&M-EST. PATIENT-LVL III: CPT | Mod: PBBFAC,,, | Performed by: FAMILY MEDICINE

## 2023-05-30 PROCEDURE — 88175 CYTOPATH C/V AUTO FLUID REDO: CPT | Performed by: OBSTETRICS & GYNECOLOGY

## 2023-05-30 PROCEDURE — 3078F DIAST BP <80 MM HG: CPT | Mod: CPTII,S$GLB,, | Performed by: OBSTETRICS & GYNECOLOGY

## 2023-05-30 PROCEDURE — 3008F PR BODY MASS INDEX (BMI) DOCUMENTED: ICD-10-PCS | Mod: CPTII,S$GLB,, | Performed by: FAMILY MEDICINE

## 2023-05-30 PROCEDURE — 3008F BODY MASS INDEX DOCD: CPT | Mod: CPTII,S$GLB,, | Performed by: FAMILY MEDICINE

## 2023-05-30 PROCEDURE — 3078F PR MOST RECENT DIASTOLIC BLOOD PRESSURE < 80 MM HG: ICD-10-PCS | Mod: CPTII,S$GLB,, | Performed by: FAMILY MEDICINE

## 2023-05-30 PROCEDURE — 87591 N.GONORRHOEAE DNA AMP PROB: CPT | Performed by: FAMILY MEDICINE

## 2023-05-30 RX ORDER — VALACYCLOVIR HYDROCHLORIDE 1 G/1
1000 TABLET, FILM COATED ORAL EVERY 12 HOURS
Qty: 30 TABLET | Refills: 3 | Status: SHIPPED | OUTPATIENT
Start: 2023-05-30 | End: 2023-09-15

## 2023-05-30 RX ORDER — TRIAMCINOLONE ACETONIDE 1 MG/G
CREAM TOPICAL 2 TIMES DAILY
Qty: 454 G | Refills: 2 | Status: SHIPPED | OUTPATIENT
Start: 2023-05-30 | End: 2024-03-11

## 2023-05-30 RX ORDER — IPRATROPIUM BROMIDE 21 UG/1
2 SPRAY, METERED NASAL 2 TIMES DAILY
Qty: 30 ML | Refills: 2 | Status: SHIPPED | OUTPATIENT
Start: 2023-05-30

## 2023-05-30 RX ORDER — IBUPROFEN 600 MG/1
600 TABLET ORAL EVERY 6 HOURS
Qty: 90 TABLET | Refills: 1 | Status: SHIPPED | OUTPATIENT
Start: 2023-05-30 | End: 2023-08-16

## 2023-05-30 NOTE — PROGRESS NOTES
"Gautam Sotomayor is a 38 y.o. female  who presents for annual exam.  Menses occur monthly, lasting 3-4 days in duration, without intermenstrual bleeding.  She has a history of one prior pregnancy with PPROM at 35 weeks.  History of hemoglobin SC disease.  No GYN complaints.   Patient's last menstrual period was 05/15/2023 (exact date).    Blood pressure 126/74, height 5' 9" (1.753 m), weight 75.3 kg (166 lb 0.1 oz), last menstrual period 05/15/2023.      Past Medical History:   Diagnosis Date    Abnormal Pap smear of cervix ,     colpo    Allergic rhinitis     Anemia     Gallstone     Genital herpes 2012    Outbreaks 1-2 per year, last outbreak 2017    Hemoglobin C disease     Sickle cell disease, type SC     Last crisis 3/2017       Past Surgical History:   Procedure Laterality Date    APPENDECTOMY      FOOT SURGERY Left     SPLENECTOMY, TOTAL      WISDOM TOOTH EXTRACTION         OB History          1    Para   1    Term   0       1    AB   0    Living   1         SAB   0    IAB   0    Ectopic   0    Multiple   0    Live Births   1                 ROS:  GENERAL: Feeling well overall.   SKIN: Denies rash or lesions.   HEAD: Denies head injury or headache.   NODES: Denies enlarged lymph nodes.   CHEST: Denies chest pain or shortness of breath.   CARDIOVASCULAR: Denies palpitations or left sided chest pain.   ABDOMEN: No abdominal pain, nausea, vomiting or rectal bleeding.   URINARY: No dysuria or hematuria.  REPRODUCTIVE: See HPI.   BREASTS: Denies pain, lumps, or nipple discharge.   HEMATOLOGIC: No easy bruisability or excessive bleeding.   MUSCULOSKELETAL: Denies joint pain or swelling.   NEUROLOGIC: Denies syncope or weakness.   PSYCHIATRIC: Denies depression.    PE:   (chaperone present during entire exam)  APPEARANCE: Well nourished, well developed, in no acute distress.  BREASTS: Symmetrical, no skin changes or visible lesions. No palpable masses, nipple discharge or " adenopathy bilaterally.  ABDOMEN: Soft. No tenderness or masses. No CVA tenderness.  VULVA: No lesions. Normal female genitalia.  URETHRAL MEATUS: Normal size and location, no lesions, no prolapse.  URETHRA: No masses, tenderness, prolapse or scarring.  VAGINA: Moist and well rugated, no abnormal discharge, no significant cystocele or rectocele.  CERVIX: No lesions and discharge. PAP done.  UTERUS: Normal size, regular shape, mobile, non-tender, bladder base nontender.  ADNEXA: No masses, tenderness or CDS nodularity.  ANUS PERINEUM: Normal.      Diagnosis:  1. Women's annual routine gynecological examination    2. Pap smear for cervical cancer screening          PLAN:    Orders Placed This Encounter    HPV High Risk Genotypes, PCR    Liquid-Based Pap Smear, Screening       Patient was counseled today on the need for annual gyn exams.  She is considering another pregnancy.  We discussed her increased risks with AMA, hemoglobin SC disease, and prior PPROM.    Follow-up in 1 year.    This note was created with voice recognition software.  Grammatical, syntax and spelling errors may be inevitable.

## 2023-05-30 NOTE — PROGRESS NOTES
Clinic Note  5/30/2023      Subjective:       Patient ID:  Gautam is a 38 y.o. female being seen for an new visit.      Chief Complaint:  Annual exam     Postnasal drip-has chronic postnasal drip and sinus issues.  On Zyrtec which does help some, has left ear discomfort/pressure.  Has been to the ENT in the past was told that she does not need tympanostomy tube.  Using Astelin nasal spray once daily.  Flonase dries nose too much and causes nasal bleeding     Left ear discomfort-was prescribed Ciprodex ear drops which does not help    STD screen-monogamous relationship with .  Requesting STD screen    Genital HSV-has outbreak once every 3-4 months, requesting refills of Valtrex    Rash-has recent rash of her forearm, requesting refill of her triamcinolone    Family History   Problem Relation Age of Onset    Cancer Paternal Grandfather         Lung    Hypertension Father     Cancer Father         Lung    Ovarian cancer Other     Colon cancer Other     Breast cancer Other     Diabetes Other      Social History     Socioeconomic History    Marital status:    Tobacco Use    Smoking status: Every Day     Packs/day: 0.25     Years: 10.00     Pack years: 2.50     Types: Cigarettes     Passive exposure: Current    Smokeless tobacco: Never    Tobacco comments:     black and milds   Substance and Sexual Activity    Alcohol use: Yes     Alcohol/week: 1.0 standard drink     Types: 1 Cans of beer per week     Comment: socially    Drug use: No    Sexual activity: Yes     Partners: Male     Birth control/protection: Condom     Past Surgical History:   Procedure Laterality Date    APPENDECTOMY      FOOT SURGERY Left     SPLENECTOMY, TOTAL      WISDOM TOOTH EXTRACTION       Medication List with Changes/Refills   New Medications    IPRATROPIUM (ATROVENT) 21 MCG (0.03 %) NASAL SPRAY    2 sprays by Each Nostril route 2 (two) times daily.   Current Medications    AZELASTINE (ASTELIN) 137 MCG (0.1 %) NASAL SPRAY    1 spray  (137 mcg total) by Nasal route 2 (two) times daily.    BACITRACIN-POLYMYXIN B (POLYSPORIN) OPHTHALMIC OINTMENT    Instill into the right eye at bedtime, as directed    CIPROFLOXACIN-DEXAMETHASONE 0.3-0.1% (CIPRODEX) 0.3-0.1 % DRPS    Place 4 drops BID into affected ear x 10 days    FLUTICASONE (FLONASE) 50 MCG/ACTUATION NASAL SPRAY    1 spray (50 mcg total) by Each Nare route once daily.    FOLIC ACID (FOLVITE) 1 MG TABLET    Take 1 mg by mouth once daily.    LEVOCETIRIZINE (XYZAL) 5 MG TABLET    Take 1 tablet (5 mg total) by mouth once daily.    MOXIFLOXACIN (VIGAMOX) 0.5 % OPHTHALMIC SOLUTION    Instill one drop into the right eye every two hours while awake   Changed and/or Refilled Medications    Modified Medication Previous Medication    IBUPROFEN (ADVIL,MOTRIN) 600 MG TABLET ibuprofen (ADVIL,MOTRIN) 600 MG tablet       Take 1 tablet (600 mg total) by mouth every 6 (six) hours.    Take 1 tablet (600 mg total) by mouth every 6 (six) hours.    TRIAMCINOLONE ACETONIDE 0.1% (KENALOG) 0.1 % CREAM triamcinolone acetonide 0.1% (KENALOG) 0.1 % cream       Apply topically 2 (two) times daily. for 10 days    Apply topically 2 (two) times daily. for 10 days    VALACYCLOVIR (VALTREX) 1000 MG TABLET valACYclovir (VALTREX) 1000 MG tablet       Take 1 tablet (1,000 mg total) by mouth every 12 (twelve) hours. For 3 days as needed    Take 1 tablet (1,000 mg total) by mouth every 12 (twelve) hours.     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   HENT:  Negative for congestion, sinus pain and sore throat.    Respiratory:  Negative for cough, shortness of breath  "and wheezing.    Cardiovascular:  Negative for chest pain and palpitations.   Gastrointestinal:  Negative for constipation, diarrhea, nausea and vomiting.   Genitourinary:  Negative for dysuria, frequency and urgency.   Musculoskeletal:  Negative for myalgias.   Skin:  Negative for rash.   Neurological:  Negative for headaches.       Objective:      BP (!) 122/58 (BP Location: Left arm, Patient Position: Sitting, BP Method: X-Large (Automatic))   Pulse 83   Ht 5' 9" (1.753 m)   Wt 75.3 kg (165 lb 14.3 oz)   LMP 05/15/2023 (Exact Date)   SpO2 100%   BMI 24.50 kg/m²   Estimated body mass index is 24.5 kg/m² as calculated from the following:    Height as of this encounter: 5' 9" (1.753 m).    Weight as of this encounter: 75.3 kg (165 lb 14.3 oz).  Physical Exam  Vitals reviewed.   Constitutional:       General: She is not in acute distress.     Appearance: She is not diaphoretic.   HENT:      Head: Normocephalic and atraumatic.      Right Ear: Hearing, tympanic membrane, ear canal and external ear normal.      Left Ear: Hearing, tympanic membrane, ear canal and external ear normal.   Eyes:      Conjunctiva/sclera: Conjunctivae normal.   Cardiovascular:      Rate and Rhythm: Normal rate and regular rhythm.      Heart sounds: Normal heart sounds.   Pulmonary:      Effort: Pulmonary effort is normal. No respiratory distress.      Breath sounds: Normal breath sounds. No wheezing.   Abdominal:      General: Bowel sounds are normal.      Palpations: Abdomen is soft.   Musculoskeletal:         General: Normal range of motion.      Cervical back: Normal range of motion.   Skin:     General: Skin is warm and dry.      Findings: No erythema or rash.          Neurological:      Mental Status: She is alert and oriented to person, place, and time.   Psychiatric:         Mood and Affect: Mood and affect normal.         Behavior: Behavior normal.         Thought Content: Thought content normal.         Judgment: Judgment " normal.         Assessment and Plan:     1. Genital herpes simplex, unspecified site  - valACYclovir (VALTREX) 1000 MG tablet; Take 1 tablet (1,000 mg total) by mouth every 12 (twelve) hours. For 3 days as needed  Dispense: 30 tablet; Refill: 3    2. Lichen planus  - triamcinolone acetonide 0.1% (KENALOG) 0.1 % cream; Apply topically 2 (two) times daily. for 10 days  Dispense: 454 g; Refill: 2    3. Knee pain, unspecified chronicity, unspecified laterality  - ibuprofen (ADVIL,MOTRIN) 600 MG tablet; Take 1 tablet (600 mg total) by mouth every 6 (six) hours.  Dispense: 90 tablet; Refill: 1    4. Annual physical exam    5. Seasonal allergic rhinitis due to pollen  - ipratropium (ATROVENT) 21 mcg (0.03 %) nasal spray; 2 sprays by Each Nostril route 2 (two) times daily.  Dispense: 30 mL; Refill: 2    6. Screen for STD (sexually transmitted disease)  - HIV 1/2 Ag/Ab (4th Gen); Future  - Hepatitis C Antibody; Future  - RPR; Future  - C. trachomatis/N. gonorrhoeae by AMP DNA [facility]; Vagina; Future  - VAGINOSIS SCREEN BY DNA PROBE    7. Leukocytosis, unspecified type  - CBC Auto Differential; Future        Follow up:   No follow-ups on file.     Other Orders Placed This Visit:  Orders Placed This Encounter   Procedures    C. trachomatis/N. gonorrhoeae by AMP DNA [facility]; Vagina     Standing Status:   Future     Standing Expiration Date:   5/30/2024     Order Specific Question:   Sources by Resulting Lab:     Answer:   [facility]     Order Specific Question:   Source:     Answer:   Vagina    VAGINOSIS SCREEN BY DNA PROBE           Order Specific Question:   Release to patient     Answer:   Immediate    CBC Auto Differential     Standing Status:   Future     Standing Expiration Date:   5/30/2024    HIV 1/2 Ag/Ab (4th Gen)     Standing Status:   Future     Standing Expiration Date:   5/30/2024    Hepatitis C Antibody     Standing Status:   Future     Standing Expiration Date:   5/30/2024    RPR     Standing Status:   Future      Standing Expiration Date:   5/30/2024           Jeffery Chawla MD        This note is dictated on M*Modal word recognition program.  There are word recognition mistakes that are occasionally missed on review.

## 2023-05-31 LAB
BACTERIAL VAGINOSIS DNA: POSITIVE
C TRACH DNA SPEC QL NAA+PROBE: NOT DETECTED
CANDIDA GLABRATA DNA: NEGATIVE
CANDIDA KRUSEI DNA: NEGATIVE
CANDIDA RRNA VAG QL PROBE: NEGATIVE
N GONORRHOEA DNA SPEC QL NAA+PROBE: NOT DETECTED
T VAGINALIS RRNA GENITAL QL PROBE: NEGATIVE

## 2023-06-03 ENCOUNTER — PATIENT MESSAGE (OUTPATIENT)
Dept: OBSTETRICS AND GYNECOLOGY | Facility: CLINIC | Age: 38
End: 2023-06-03
Payer: COMMERCIAL

## 2023-06-03 LAB
FINAL PATHOLOGIC DIAGNOSIS: NORMAL
Lab: NORMAL

## 2023-06-04 ENCOUNTER — PATIENT MESSAGE (OUTPATIENT)
Dept: OBSTETRICS AND GYNECOLOGY | Facility: CLINIC | Age: 38
End: 2023-06-04
Payer: COMMERCIAL

## 2023-06-05 ENCOUNTER — TELEPHONE (OUTPATIENT)
Dept: OBSTETRICS AND GYNECOLOGY | Facility: CLINIC | Age: 38
End: 2023-06-05
Payer: COMMERCIAL

## 2023-06-05 RX ORDER — METRONIDAZOLE 500 MG/1
500 TABLET ORAL EVERY 12 HOURS
Qty: 14 TABLET | Refills: 0 | Status: SHIPPED | OUTPATIENT
Start: 2023-06-05 | End: 2023-06-12

## 2023-06-05 NOTE — TELEPHONE ENCOUNTER
I will send your response to Dr Joshua. Please check with the pharmacy later today or tomorrow morning. Cielo  ===View-only below this line===      ----- Message -----       From:Gautam Sotomayor       Sent:6/4/2023 11:52 AM CDT         To:User Message Message List    Subject:RESULTS    Hi, thank you. I would like to be treated.      ----- Message -----       From:Pasha Joshua MD       Sent:6/3/2023  6:10 PM CDT         To:Gautam Sotomayor    Subject:RESULTS    -Hi    The results of your most recent pap smear are normal.   This means that no cancerous or precancerous cells were seen.     Your pap did show that you may have a condition called bacterial vaginosis.  Bacterial vaginosis is NOT an infection, but rather an imbalance of the normal bacteria found in the vagina.   If you have any symptoms of vaginal discharge or odor, then you should be treated.  If you have no vaginal symptoms, then you do not require treatment.  Please let us know if you would like treatment.    We recommend that you come back in 1 year for your next routine exam.    Have a nice weekend,  -Dr. Joshua

## 2023-06-06 ENCOUNTER — TELEPHONE (OUTPATIENT)
Dept: OPHTHALMOLOGY | Facility: CLINIC | Age: 38
End: 2023-06-06
Payer: COMMERCIAL

## 2023-06-06 ENCOUNTER — PATIENT MESSAGE (OUTPATIENT)
Dept: OPTOMETRY | Facility: CLINIC | Age: 38
End: 2023-06-06
Payer: COMMERCIAL

## 2023-06-06 ENCOUNTER — PATIENT MESSAGE (OUTPATIENT)
Dept: OBSTETRICS AND GYNECOLOGY | Facility: CLINIC | Age: 38
End: 2023-06-06
Payer: COMMERCIAL

## 2023-06-06 LAB
HPV HR 12 DNA SPEC QL NAA+PROBE: NEGATIVE
HPV16 AG SPEC QL: NEGATIVE
HPV18 DNA SPEC QL NAA+PROBE: NEGATIVE

## 2023-06-07 ENCOUNTER — TELEPHONE (OUTPATIENT)
Dept: OPTOMETRY | Facility: CLINIC | Age: 38
End: 2023-06-07
Payer: COMMERCIAL

## 2023-06-07 DIAGNOSIS — A60.00 GENITAL HERPES SIMPLEX, UNSPECIFIED SITE: ICD-10-CM

## 2023-06-07 RX ORDER — VALACYCLOVIR HYDROCHLORIDE 1 G/1
1000 TABLET, FILM COATED ORAL EVERY 12 HOURS
Qty: 30 TABLET | Refills: 3 | OUTPATIENT
Start: 2023-06-07

## 2023-06-07 NOTE — TELEPHONE ENCOUNTER
Care Due:                  Date            Visit Type   Department     Provider  --------------------------------------------------------------------------------                                EP -                              PRIMARY      [facility] PRIMARY  Last Visit: 05-      CARE ([facility])   CARE           [facility]  Next Visit: None Scheduled  None         None Found                                                            Last  Test          Frequency    Reason                     Performed    Due Date  --------------------------------------------------------------------------------    Cr..........  12 months..  valACYclovir.............  04- 04-    [facility] Embedded Care Due Messages. Reference number: [id number].   6/07/2023 1:32:02 PM CDT

## 2023-06-07 NOTE — TELEPHONE ENCOUNTER
Provider Staff:  Action required for this patient     Please see care gap opportunities below in Care Due Message.    Thanks!  [facility]     Appointments      Date Provider   Last Visit   5/30/2023 Jeffery Chawla MD   Next Visit   Visit date not found Jeffery Chawla MD     Refill Decision Note   Gautam Sotomayor  is requesting a refill authorization.  Brief Assessment and Rationale for Refill:  Quick Discontinue     Medication Therapy Plan:  E-Prescribing Status: Receipt confirmed by pharmacy (5/30/2023  3:55 PM CDT)      Comments:     Note composed:2:18 PM 06/07/2023             Appointments     Last Visit   5/30/2023 Jeffery Chawla MD   Next Visit   Visit date not found Jeffery Chawla MD           Appointments     Last Visit   5/30/2023 Jeffery Chawla MD   Next Visit   Visit date not found Jeffery Chawla MD

## 2023-06-12 ENCOUNTER — OFFICE VISIT (OUTPATIENT)
Dept: OPTOMETRY | Facility: CLINIC | Age: 38
End: 2023-06-12
Payer: COMMERCIAL

## 2023-06-12 DIAGNOSIS — H16.001 CORNEAL ULCER OF RIGHT EYE: Primary | ICD-10-CM

## 2023-06-12 PROCEDURE — 99999 PR PBB SHADOW E&M-EST. PATIENT-LVL III: CPT | Mod: PBBFAC,,, | Performed by: OPTOMETRIST

## 2023-06-12 PROCEDURE — 1159F MED LIST DOCD IN RCRD: CPT | Mod: CPTII,S$GLB,, | Performed by: OPTOMETRIST

## 2023-06-12 PROCEDURE — 92012 INTRM OPH EXAM EST PATIENT: CPT | Mod: S$GLB,,, | Performed by: OPTOMETRIST

## 2023-06-12 PROCEDURE — 99999 PR PBB SHADOW E&M-EST. PATIENT-LVL III: ICD-10-PCS | Mod: PBBFAC,,, | Performed by: OPTOMETRIST

## 2023-06-12 PROCEDURE — 92012 PR EYE EXAM, EST PATIENT,INTERMED: ICD-10-PCS | Mod: S$GLB,,, | Performed by: OPTOMETRIST

## 2023-06-12 PROCEDURE — 1159F PR MEDICATION LIST DOCUMENTED IN MEDICAL RECORD: ICD-10-PCS | Mod: CPTII,S$GLB,, | Performed by: OPTOMETRIST

## 2023-06-12 NOTE — PATIENT INSTRUCTIONS
Soft contact lens wearer with recent history of peripheral corneal ulcer in the right eye.  Resolved with treatment and then resumed contact lens wear.    Ms. Sotomayor reports that she experienced similar symptoms of discomfort in the right eye last week.  She discontinued contact lens wear for several days and resumed use of tobramycin ophthalmic solution in the right eye.  Right eye feels somewhat better, but still bothersome.   She came in with CLs in today.    Removed CLs, and performed slit lamp examinination, which indicated recurrence of shallow corneal ulcer near the temporal limbus in the right eye.    Discontinue contact lens wear, at least in the right eye.  Issued new sample of Trobramycin ophthalmic solution for use in the right eye every four hours while awake.  Restart use of Polysporin ophthalmic ointment in the right eye at bedtime.  Return on 06/15/2023 for progress check.

## 2023-06-12 NOTE — PROGRESS NOTES
Assessment /Plan     For exam results, see Encounter Report.    1. Corneal ulcer of right eye                       Soft contact lens wearer with recent history of peripheral corneal ulcer in the right eye.  Resolved with treatment and then resumed contact lens wear.    Ms. Sotomayor reports that she experienced similar symptoms of discomfort in the right eye last week.  She discontinued contact lens wear for several days and resumed use of tobramycin ophthalmic solution in the right eye.  Right eye feels somewhat better, but still bothersome.   She came in with CLs in today.    Removed CLs, and performed slit lamp examinination, which indicated recurrence of shallow corneal ulcer near the temporal limbus in the right eye.    Discontinue contact lens wear, at least in the right eye.  Issued new sample of Trobramycin ophthalmic solution for use in the right eye every four hours while awake.  Restart use of Polysporin ophthalmic ointment in the right eye at bedtime.  Return on 06/15/2023 for progress check.

## 2023-06-15 ENCOUNTER — OFFICE VISIT (OUTPATIENT)
Dept: OPTOMETRY | Facility: CLINIC | Age: 38
End: 2023-06-15
Payer: COMMERCIAL

## 2023-06-15 DIAGNOSIS — H16.001 CORNEAL ULCER OF RIGHT EYE: Primary | ICD-10-CM

## 2023-06-15 PROCEDURE — 99499 NO LOS: ICD-10-PCS | Mod: S$GLB,,, | Performed by: OPTOMETRIST

## 2023-06-15 PROCEDURE — 99999 PR PBB SHADOW E&M-EST. PATIENT-LVL III: ICD-10-PCS | Mod: PBBFAC,,, | Performed by: OPTOMETRIST

## 2023-06-15 PROCEDURE — 99499 UNLISTED E&M SERVICE: CPT | Mod: S$GLB,,, | Performed by: OPTOMETRIST

## 2023-06-15 PROCEDURE — 99999 PR PBB SHADOW E&M-EST. PATIENT-LVL III: CPT | Mod: PBBFAC,,, | Performed by: OPTOMETRIST

## 2023-06-16 NOTE — PROGRESS NOTES
HPI    Patient in for urgent care visit.    Being followed for (diagnosis):   eye problem     Date last seen:  6/12/2023    Doctor last seen:  Dr. Crisostomo    Prescribed eye medications(s) using:  tobramycin    OTC eye medication(s) using:  no    Signs/symptoms of condition resolved/better/stable/worse?:  stable              Last edited by Tavon Arora MA on 6/15/2023  2:55 PM.            Assessment /Plan     For exam results, see Encounter Report.    1. Corneal ulcer of right eye                       Soft contact lens wearer with recent history of peripheral corneal ulcer, with recurrence following resumption of contact lens wear.    Corneal ulcer now virtually resolved, with only residual superficial punctate keratopathy (SPK).  Decrease use of tobramycin ophthalmic solution in the right eye to two times per day through 06/18/2023.  Okay to resume contact lens wear on 06/20/2023 if right eye appears and feels normal.   Monitor thereafter for recurrence of signs/symptoms in the right eye.  If problems recur, will consider refitting with SCLs of new design/material - possibly daily disposable SCLs.

## 2023-06-16 NOTE — PATIENT INSTRUCTIONS
Soft contact lens wearer with recent history of peripheral corneal ulcer, with recurrence following resumption of contact lens wear.    Corneal ulcer now virtually resolved, with only residual superficial punctate keratopathy (SPK).  Decrease use of tobramycin ophthalmic solution in the right eye to two times per day through 06/18/2023.  Okay to resume contact lens wear on 06/20/2023 if right eye appears and feels normal.   Monitor thereafter for recurrence of signs/symptoms in the right eye.  If problems recur, will consider refitting with SCLs of new design/material - possibly daily disposable SCLs.

## 2023-07-03 ENCOUNTER — PATIENT MESSAGE (OUTPATIENT)
Dept: PRIMARY CARE CLINIC | Facility: CLINIC | Age: 38
End: 2023-07-03
Payer: COMMERCIAL

## 2023-07-06 ENCOUNTER — OFFICE VISIT (OUTPATIENT)
Dept: PRIMARY CARE CLINIC | Facility: CLINIC | Age: 38
End: 2023-07-06
Payer: COMMERCIAL

## 2023-07-06 DIAGNOSIS — J01.00 ACUTE MAXILLARY SINUSITIS, RECURRENCE NOT SPECIFIED: Primary | ICD-10-CM

## 2023-07-06 PROCEDURE — 99213 OFFICE O/P EST LOW 20 MIN: CPT | Mod: 95,,, | Performed by: NURSE PRACTITIONER

## 2023-07-06 PROCEDURE — 1159F PR MEDICATION LIST DOCUMENTED IN MEDICAL RECORD: ICD-10-PCS | Mod: CPTII,95,, | Performed by: NURSE PRACTITIONER

## 2023-07-06 PROCEDURE — 1159F MED LIST DOCD IN RCRD: CPT | Mod: CPTII,95,, | Performed by: NURSE PRACTITIONER

## 2023-07-06 PROCEDURE — 1160F PR REVIEW ALL MEDS BY PRESCRIBER/CLIN PHARMACIST DOCUMENTED: ICD-10-PCS | Mod: CPTII,95,, | Performed by: NURSE PRACTITIONER

## 2023-07-06 PROCEDURE — 99213 PR OFFICE/OUTPT VISIT, EST, LEVL III, 20-29 MIN: ICD-10-PCS | Mod: 95,,, | Performed by: NURSE PRACTITIONER

## 2023-07-06 PROCEDURE — 1160F RVW MEDS BY RX/DR IN RCRD: CPT | Mod: CPTII,95,, | Performed by: NURSE PRACTITIONER

## 2023-07-06 RX ORDER — AMOXICILLIN AND CLAVULANATE POTASSIUM 875; 125 MG/1; MG/1
1 TABLET, FILM COATED ORAL EVERY 12 HOURS
Qty: 20 TABLET | Refills: 0 | Status: SHIPPED | OUTPATIENT
Start: 2023-07-06 | End: 2023-07-16

## 2023-07-06 NOTE — PROGRESS NOTES
The patient location is: [place]  The chief complaint leading to consultation is: sinus    Visit type: audiovisual    Face to Face time with patient: 7  20 minutes of total time spent on the encounter, which includes face to face time and non-face to face time preparing to see the patient (eg, review of tests), Obtaining and/or reviewing separately obtained history, Documenting clinical information in the electronic or other health record, Independently interpreting results (not separately reported) and communicating results to the patient/family/caregiver, or Care coordination (not separately reported).         Each patient to whom he or she provides medical services by telemedicine is:  (1) informed of the relationship between the physician and patient and the respective role of any other health care provider with respect to management of the patient; and (2) notified that he or she may decline to receive medical services by telemedicine and may withdraw from such care at any time.    Notes:   Subjective:       Patient ID: Gautam Sotomayor is a 38 y.o. female.    Chief Complaint: sinus.    Ms. Gautam Sotomayor is a 38 year old female, new to me, presents with sinus complaints. PCP is keshav Chawla. Medical and surgical history in addition to problem list reviewed as listed below.      Sinusitis  This is a new problem. The current episode started in the past 7 days. The problem has been gradually worsening since onset. There has been no fever. She is experiencing no pain. Associated symptoms include congestion, coughing, ear pain and sinus pressure. Pertinent negatives include no headaches, neck pain, sneezing or sore throat. Treatments tried: OTC sinus medication. The treatment provided no relief.     Past Medical History:   Diagnosis Date    Abnormal Pap smear of cervix 2016, 2017    colpo    Allergic rhinitis     Anemia     Gallstone     Genital herpes 2012    Outbreaks 1-2 per year, last outbreak 8/2017     Hemoglobin C disease     Sickle cell disease, type SC     Last crisis 3/2017        Past Surgical History:   Procedure Laterality Date    APPENDECTOMY      FOOT SURGERY Left     SPLENECTOMY, TOTAL      WISDOM TOOTH EXTRACTION          Family History   Problem Relation Age of Onset    Cancer Paternal Grandfather         Lung    Hypertension Father     Cancer Father         Lung    Ovarian cancer Other     Colon cancer Other     Breast cancer Other     Diabetes Other        Social History     Tobacco Use   Smoking Status Every Day    Packs/day: 0.25    Years: 10.00    Pack years: 2.50    Types: Cigarettes    Passive exposure: Current   Smokeless Tobacco Never   Tobacco Comments    black and milds       Social History     Social History Narrative    Not on file       Review of patient's allergies indicates:   Allergen Reactions    Prednisone      Sickle cell crisis     Doxycycline      GI Upset with taking this medication     Nickel Rash        Review of Systems   Constitutional:  Negative for fever.   HENT:  Positive for congestion, ear pain, rhinorrhea (green and yellow), sinus pressure and sinus pain. Negative for postnasal drip, sneezing and sore throat.    Respiratory:  Positive for cough.    Cardiovascular:  Negative for chest pain and palpitations.   Gastrointestinal:  Negative for nausea and vomiting.   Musculoskeletal:  Negative for neck pain.   Neurological:  Negative for headaches.       Objective:        There were no vitals filed for this visit.     Limited physical examination due to nature of virtual/telemedicine visit.    Physical Exam  Constitutional:       Appearance: Normal appearance.   Pulmonary:      Effort: Pulmonary effort is normal. No respiratory distress.   Neurological:      Mental Status: She is alert and oriented to person, place, and time.       Assessment:       1. Acute maxillary sinusitis, recurrence not specified        Plan:       Acute maxillary sinusitis, recurrence not  specified  -     amoxicillin-clavulanate 875-125mg (AUGMENTIN) 875-125 mg per tablet; Take 1 tablet by mouth every 12 (twelve) hours. for 10 days  Dispense: 20 tablet; Refill: 0    Maintain hydration.   Alternate OTC Tylenol/Ibuprofen for pain/ discomfort as directed on packaging.  Recommend humidifier /Neti pot as tolerated.    Medication List with Changes/Refills   New Medications    AMOXICILLIN-CLAVULANATE 875-125MG (AUGMENTIN) 875-125 MG PER TABLET    Take 1 tablet by mouth every 12 (twelve) hours. for 10 days   Current Medications    AZELASTINE (ASTELIN) 137 MCG (0.1 %) NASAL SPRAY    1 spray (137 mcg total) by Nasal route 2 (two) times daily.    CIPROFLOXACIN-DEXAMETHASONE 0.3-0.1% (CIPRODEX) 0.3-0.1 % DRPS    Place 4 drops BID into affected ear x 10 days    FOLIC ACID (FOLVITE) 1 MG TABLET    Take 1 mg by mouth once daily.    IBUPROFEN (ADVIL,MOTRIN) 600 MG TABLET    Take 1 tablet (600 mg total) by mouth every 6 (six) hours.    IPRATROPIUM (ATROVENT) 21 MCG (0.03 %) NASAL SPRAY    2 sprays by Each Nostril route 2 (two) times daily.    LEVOCETIRIZINE (XYZAL) 5 MG TABLET    Take 1 tablet (5 mg total) by mouth once daily.    MOXIFLOXACIN (VIGAMOX) 0.5 % OPHTHALMIC SOLUTION    Instill one drop into the right eye every two hours while awake    TRIAMCINOLONE ACETONIDE 0.1% (KENALOG) 0.1 % CREAM    Apply topically 2 (two) times daily. for 10 days    VALACYCLOVIR (VALTREX) 1000 MG TABLET    Take 1 tablet (1,000 mg total) by mouth every 12 (twelve) hours. For 3 days as needed            Follow up if symptoms worsen or fail to improve.    Marissa Hsu, APRN, MSN, FNP-C

## 2023-08-12 DIAGNOSIS — M25.569 KNEE PAIN, UNSPECIFIED CHRONICITY, UNSPECIFIED LATERALITY: ICD-10-CM

## 2023-08-16 RX ORDER — IBUPROFEN 600 MG/1
600 TABLET ORAL EVERY 6 HOURS
Qty: 90 TABLET | Refills: 1 | Status: SHIPPED | OUTPATIENT
Start: 2023-08-16 | End: 2023-12-21

## 2023-09-14 DIAGNOSIS — A60.00 GENITAL HERPES SIMPLEX, UNSPECIFIED SITE: ICD-10-CM

## 2023-09-14 NOTE — TELEPHONE ENCOUNTER
Refill Routing Note   Medication(s) are not appropriate for processing by [facility] for the following reason(s):      Required labs outdated    [facility] action(s):  Defer Care Due:  Labs due              Appointments  past 12m or future 3m with PCP    Date Provider   Last Visit   5/30/2023 Jeffery Chawla MD   Next Visit   Visit date not found Jeffery Chawla MD   ED visits in past 90 days: 0        Note composed:2:43 PM 09/14/2023

## 2023-09-14 NOTE — TELEPHONE ENCOUNTER
Care Due:                  Date            Visit Type   Department     Provider  --------------------------------------------------------------------------------                                EP -                              PRIMARY      [facility] PRIMARY  Last Visit: 05-      CARE ([facility])   CARE           [facility]  Next Visit: None Scheduled  None         None Found                                                            Last  Test          Frequency    Reason                     Performed    Due Date  --------------------------------------------------------------------------------    Cr..........  12 months..  valACYclovir.............  04- 04-    [facility] Embedded Care Due Messages. Reference number: [id number].   9/14/2023 1:32:04 PM CDT

## 2023-09-15 RX ORDER — VALACYCLOVIR HYDROCHLORIDE 1 G/1
1000 TABLET, FILM COATED ORAL EVERY 12 HOURS
Qty: 30 TABLET | Refills: 3 | Status: SHIPPED | OUTPATIENT
Start: 2023-09-15 | End: 2023-10-03

## 2023-10-03 DIAGNOSIS — A60.00 GENITAL HERPES SIMPLEX, UNSPECIFIED SITE: ICD-10-CM

## 2023-10-03 RX ORDER — VALACYCLOVIR HYDROCHLORIDE 1 G/1
1000 TABLET, FILM COATED ORAL EVERY 12 HOURS
Qty: 30 TABLET | Refills: 8 | Status: SHIPPED | OUTPATIENT
Start: 2023-10-03

## 2023-10-03 NOTE — TELEPHONE ENCOUNTER
No care due was identified.  Health [facility] Embedded Care Due Messages. Reference number: [id number].   10/03/2023 1:45:19 PM CDT

## 2023-10-03 NOTE — TELEPHONE ENCOUNTER
Refill Decision Note   Gautam Sotomayor  is requesting a refill authorization.  Brief Assessment and Rationale for Refill:  Approve     Medication Therapy Plan:  Labs not required for PRN use.      Comments:     Note composed:6:19 PM 10/03/2023

## 2023-11-06 ENCOUNTER — OFFICE VISIT (OUTPATIENT)
Dept: OPTOMETRY | Facility: CLINIC | Age: 38
End: 2023-11-06
Payer: COMMERCIAL

## 2023-11-06 DIAGNOSIS — H52.13 MYOPIA OF BOTH EYES: ICD-10-CM

## 2023-11-06 DIAGNOSIS — Z13.5 SCREENING FOR EYE CONDITION: ICD-10-CM

## 2023-11-06 DIAGNOSIS — D57.20 SICKLE CELL-HEMOGLOBIN C DISEASE WITHOUT CRISIS: ICD-10-CM

## 2023-11-06 DIAGNOSIS — Z46.0 ENCOUNTER FOR FITTING OR ADJUSTMENT OF SPECTACLES OR CONTACT LENSES: Primary | ICD-10-CM

## 2023-11-06 DIAGNOSIS — Z01.00 ENCOUNTER FOR ROUTINE EYE AND VISION EXAMINATION: Primary | ICD-10-CM

## 2023-11-06 PROCEDURE — 1159F MED LIST DOCD IN RCRD: CPT | Mod: CPTII,S$GLB,, | Performed by: OPTOMETRIST

## 2023-11-06 PROCEDURE — 92015 DETERMINE REFRACTIVE STATE: CPT | Mod: S$GLB,,, | Performed by: OPTOMETRIST

## 2023-11-06 PROCEDURE — 1159F PR MEDICATION LIST DOCUMENTED IN MEDICAL RECORD: ICD-10-PCS | Mod: CPTII,S$GLB,, | Performed by: OPTOMETRIST

## 2023-11-06 PROCEDURE — 99499 NO LOS: ICD-10-PCS | Mod: ,,, | Performed by: OPTOMETRIST

## 2023-11-06 PROCEDURE — 92310 CONTACT LENS FITTING OU: CPT | Mod: CSM,S$GLB,, | Performed by: OPTOMETRIST

## 2023-11-06 PROCEDURE — 92015 PR REFRACTION: ICD-10-PCS | Mod: S$GLB,,, | Performed by: OPTOMETRIST

## 2023-11-06 PROCEDURE — 99999 PR PBB SHADOW E&M-EST. PATIENT-LVL III: ICD-10-PCS | Mod: PBBFAC,,, | Performed by: OPTOMETRIST

## 2023-11-06 PROCEDURE — 92310 PR CONTACT LENS FITTING (NO CHANGE): ICD-10-PCS | Mod: CSM,S$GLB,, | Performed by: OPTOMETRIST

## 2023-11-06 PROCEDURE — 92014 PR EYE EXAM, EST PATIENT,COMPREHESV: ICD-10-PCS | Mod: S$GLB,,, | Performed by: OPTOMETRIST

## 2023-11-06 PROCEDURE — 99999 PR PBB SHADOW E&M-EST. PATIENT-LVL III: CPT | Mod: PBBFAC,,, | Performed by: OPTOMETRIST

## 2023-11-06 PROCEDURE — 99499 UNLISTED E&M SERVICE: CPT | Mod: ,,, | Performed by: OPTOMETRIST

## 2023-11-06 PROCEDURE — 92014 COMPRE OPH EXAM EST PT 1/>: CPT | Mod: S$GLB,,, | Performed by: OPTOMETRIST

## 2023-11-06 NOTE — PATIENT INSTRUCTIONS
Ms. Sotomayor in today for general eye exam and refraction and contact lens follow-up visit  Wearing Air Optix with Hydraglyde soft contact lenses on a daily wear basis.  History of peripheral corneal ulcer in the right eye.  Now resolved.    Good contact lens fit in each eye, without evidence of adverse effects of contact lens wear currently.  Showing need for minor power change (increase) of the right contact lens.  Power of the left contact lens appears fine as is;  However,  per discussion, Ms. Sotomayor requests that the power of the right contact lens not be changed so the power of the contact lenses will be equal (for convenience).  Thus, no change made to the contact lens power in either eye.  New (duplicate) contact lens Rx issued.    Myopia in each eye, per post-contact lens refraction done today.  Good best-corrected VA achieved in each eye.  New spectacle lens Rx issued for use in lieu of contact lenses.    Recheck in one year - or prior,if any problems noted in the interim

## 2023-11-06 NOTE — PROGRESS NOTES
HPI     Contact Lens Follow Up            Comments: Patient in today for contact lens follow-up with general eye   examination.  Refer to additional patient encounter notes dated   11/06/2023.              Comments    Patient in today for contact lens follow-up with general eye examination.    Refer to additional patient encounter notes dated 11/06/2023.              Last edited by Jaylon Crisostomo OD on 11/6/2023  5:39 PM.            Assessment /Plan     For exam results, see Encounter Report.    1. Encounter for fitting or adjustment of spectacles or contact lenses                     Ms. Sotomayor in today for general eye exam and refraction and contact lens follow-up visit  Wearing Air Optix with Hydraglyde soft contact lenses on a daily wear basis.  History of peripheral corneal ulcer in the right eye.  Now resolved.    Good contact lens fit in each eye, without evidence of adverse effects of contact lens wear currently.  Showing need for minor power change (increase) of the right contact lens.  Power of the left contact lens appears fine as is;  However,  per discussion, Ms. Sotomayor requests that the power of the right contact lens not be changed so the power of the contact lenses will be equal (for convenience).  Thus, no change made to the contact lens power in either eye.  New (duplicate) contact lens Rx issued.    Myopia in each eye, per post-contact lens refraction done today.  Good best-corrected VA achieved in each eye.  New spectacle lens Rx issued for use in lieu of contact lenses.    Recheck in one year - or prior,if any problems noted in the interim
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
## 2023-11-08 ENCOUNTER — PATIENT MESSAGE (OUTPATIENT)
Dept: OPTOMETRY | Facility: CLINIC | Age: 38
End: 2023-11-08
Payer: COMMERCIAL

## 2023-11-15 ENCOUNTER — LAB VISIT (OUTPATIENT)
Dept: LAB | Facility: HOSPITAL | Age: 38
End: 2023-11-15
Attending: INTERNAL MEDICINE
Payer: COMMERCIAL

## 2023-11-15 ENCOUNTER — OFFICE VISIT (OUTPATIENT)
Dept: GASTROENTEROLOGY | Facility: CLINIC | Age: 38
End: 2023-11-15
Payer: COMMERCIAL

## 2023-11-15 ENCOUNTER — TELEPHONE (OUTPATIENT)
Dept: GASTROENTEROLOGY | Facility: CLINIC | Age: 38
End: 2023-11-15
Payer: COMMERCIAL

## 2023-11-15 VITALS
SYSTOLIC BLOOD PRESSURE: 123 MMHG | WEIGHT: 171.06 LBS | HEIGHT: 69 IN | BODY MASS INDEX: 25.34 KG/M2 | DIASTOLIC BLOOD PRESSURE: 78 MMHG | HEART RATE: 88 BPM

## 2023-11-15 DIAGNOSIS — R10.30 LOWER ABDOMINAL PAIN: Primary | ICD-10-CM

## 2023-11-15 DIAGNOSIS — R19.7 DIARRHEA OF PRESUMED INFECTIOUS ORIGIN: ICD-10-CM

## 2023-11-15 DIAGNOSIS — R14.0 BLOATING: ICD-10-CM

## 2023-11-15 DIAGNOSIS — K80.20 CALCULUS OF GALLBLADDER WITHOUT CHOLECYSTITIS WITHOUT OBSTRUCTION: ICD-10-CM

## 2023-11-15 LAB
ALBUMIN SERPL BCP-MCNC: 4.2 G/DL (ref 3.5–5.2)
ALP SERPL-CCNC: 61 U/L (ref 55–135)
ALT SERPL W/O P-5'-P-CCNC: 20 U/L (ref 10–44)
ANION GAP SERPL CALC-SCNC: 7 MMOL/L (ref 8–16)
AST SERPL-CCNC: 28 U/L (ref 10–40)
BASOPHILS # BLD AUTO: 0.1 K/UL (ref 0–0.2)
BASOPHILS NFR BLD: 0.9 % (ref 0–1.9)
BILIRUB SERPL-MCNC: 0.5 MG/DL (ref 0.1–1)
BUN SERPL-MCNC: 7 MG/DL (ref 6–20)
CALCIUM SERPL-MCNC: 9.2 MG/DL (ref 8.7–10.5)
CHLORIDE SERPL-SCNC: 109 MMOL/L (ref 95–110)
CO2 SERPL-SCNC: 24 MMOL/L (ref 23–29)
CREAT SERPL-MCNC: 0.7 MG/DL (ref 0.5–1.4)
CRP SERPL-MCNC: 2.1 MG/L (ref 0–8.2)
DIFFERENTIAL METHOD: ABNORMAL
EOSINOPHIL # BLD AUTO: 0.3 K/UL (ref 0–0.5)
EOSINOPHIL NFR BLD: 2.3 % (ref 0–8)
ERYTHROCYTE [DISTWIDTH] IN BLOOD BY AUTOMATED COUNT: 14.1 % (ref 11.5–14.5)
EST. GFR  (NO RACE VARIABLE): >60 ML/MIN/1.73 M^2
GLUCOSE SERPL-MCNC: 84 MG/DL (ref 70–110)
HCT VFR BLD AUTO: 31.1 % (ref 37–48.5)
HGB BLD-MCNC: 11.3 G/DL (ref 12–16)
IMM GRANULOCYTES # BLD AUTO: 0.03 K/UL (ref 0–0.04)
IMM GRANULOCYTES NFR BLD AUTO: 0.3 % (ref 0–0.5)
LYMPHOCYTES # BLD AUTO: 4.7 K/UL (ref 1–4.8)
LYMPHOCYTES NFR BLD: 42.5 % (ref 18–48)
MCH RBC QN AUTO: 29.4 PG (ref 27–31)
MCHC RBC AUTO-ENTMCNC: 36.3 G/DL (ref 32–36)
MCV RBC AUTO: 81 FL (ref 82–98)
MONOCYTES # BLD AUTO: 0.9 K/UL (ref 0.3–1)
MONOCYTES NFR BLD: 7.8 % (ref 4–15)
NEUTROPHILS # BLD AUTO: 5.1 K/UL (ref 1.8–7.7)
NEUTROPHILS NFR BLD: 46.2 % (ref 38–73)
NRBC BLD-RTO: 1 /100 WBC
PLATELET # BLD AUTO: 392 K/UL (ref 150–450)
PMV BLD AUTO: 11.1 FL (ref 9.2–12.9)
POTASSIUM SERPL-SCNC: 3.9 MMOL/L (ref 3.5–5.1)
PROT SERPL-MCNC: 7.1 G/DL (ref 6–8.4)
RBC # BLD AUTO: 3.85 M/UL (ref 4–5.4)
SODIUM SERPL-SCNC: 140 MMOL/L (ref 136–145)
WBC # BLD AUTO: 11.03 K/UL (ref 3.9–12.7)

## 2023-11-15 PROCEDURE — 99999 PR PBB SHADOW E&M-EST. PATIENT-LVL IV: ICD-10-PCS | Mod: PBBFAC,,, | Performed by: INTERNAL MEDICINE

## 2023-11-15 PROCEDURE — 99999 PR PBB SHADOW E&M-EST. PATIENT-LVL IV: CPT | Mod: PBBFAC,,, | Performed by: INTERNAL MEDICINE

## 2023-11-15 PROCEDURE — 1159F MED LIST DOCD IN RCRD: CPT | Mod: CPTII,S$GLB,, | Performed by: INTERNAL MEDICINE

## 2023-11-15 PROCEDURE — 3008F BODY MASS INDEX DOCD: CPT | Mod: CPTII,S$GLB,, | Performed by: INTERNAL MEDICINE

## 2023-11-15 PROCEDURE — 3008F PR BODY MASS INDEX (BMI) DOCUMENTED: ICD-10-PCS | Mod: CPTII,S$GLB,, | Performed by: INTERNAL MEDICINE

## 2023-11-15 PROCEDURE — 3074F PR MOST RECENT SYSTOLIC BLOOD PRESSURE < 130 MM HG: ICD-10-PCS | Mod: CPTII,S$GLB,, | Performed by: INTERNAL MEDICINE

## 2023-11-15 PROCEDURE — 36415 COLL VENOUS BLD VENIPUNCTURE: CPT | Performed by: INTERNAL MEDICINE

## 2023-11-15 PROCEDURE — 1159F PR MEDICATION LIST DOCUMENTED IN MEDICAL RECORD: ICD-10-PCS | Mod: CPTII,S$GLB,, | Performed by: INTERNAL MEDICINE

## 2023-11-15 PROCEDURE — 80053 COMPREHEN METABOLIC PANEL: CPT | Performed by: INTERNAL MEDICINE

## 2023-11-15 PROCEDURE — 3078F PR MOST RECENT DIASTOLIC BLOOD PRESSURE < 80 MM HG: ICD-10-PCS | Mod: CPTII,S$GLB,, | Performed by: INTERNAL MEDICINE

## 2023-11-15 PROCEDURE — 86140 C-REACTIVE PROTEIN: CPT | Performed by: INTERNAL MEDICINE

## 2023-11-15 PROCEDURE — 1160F PR REVIEW ALL MEDS BY PRESCRIBER/CLIN PHARMACIST DOCUMENTED: ICD-10-PCS | Mod: CPTII,S$GLB,, | Performed by: INTERNAL MEDICINE

## 2023-11-15 PROCEDURE — 3078F DIAST BP <80 MM HG: CPT | Mod: CPTII,S$GLB,, | Performed by: INTERNAL MEDICINE

## 2023-11-15 PROCEDURE — 3074F SYST BP LT 130 MM HG: CPT | Mod: CPTII,S$GLB,, | Performed by: INTERNAL MEDICINE

## 2023-11-15 PROCEDURE — 85025 COMPLETE CBC W/AUTO DIFF WBC: CPT | Performed by: INTERNAL MEDICINE

## 2023-11-15 PROCEDURE — 99203 OFFICE O/P NEW LOW 30 MIN: CPT | Mod: S$GLB,,, | Performed by: INTERNAL MEDICINE

## 2023-11-15 PROCEDURE — 99203 PR OFFICE/OUTPT VISIT, NEW, LEVL III, 30-44 MIN: ICD-10-PCS | Mod: S$GLB,,, | Performed by: INTERNAL MEDICINE

## 2023-11-15 PROCEDURE — 82784 ASSAY IGA/IGD/IGG/IGM EACH: CPT | Performed by: INTERNAL MEDICINE

## 2023-11-15 PROCEDURE — 86364 TISS TRNSGLTMNASE EA IG CLAS: CPT | Performed by: INTERNAL MEDICINE

## 2023-11-15 PROCEDURE — 1160F RVW MEDS BY RX/DR IN RCRD: CPT | Mod: CPTII,S$GLB,, | Performed by: INTERNAL MEDICINE

## 2023-11-15 NOTE — PROGRESS NOTES
[facility] Consultation Note    Reason for Consult:    Chief Complaint   Patient presents with    Abdominal Cramping    Emesis    Bloated       PCP:   Jeffery Chawla    Referring MD:  Jeffery Chawla, Md  [address] [place] [address]      HPI:  Gautam Sotomayor is a 38 y.o. female here for evaluation of recurrent episodes of abdominal pain, cramps, nausea, and diarrhea.  She is new to my clinic.  She reports at least 2 episodes in the past month where she experienced the sudden, and random onset of symptoms that started with abdominal cramps and nausea with an episode of emesis, then progressed to diarrhea that would last for few days.  This did not occur with eating.  She also had bloating and cramping that lasted for about a week.  The symptoms initially went away, but then came back again.  These have resolved now, and she is been doing okay for the past 2 weeks, but reports some ongoing bloating.  She reports no fevers or chills during this time.  She has a 5-year-old son at home who had similar stomach problems.  She took some Pepto-Bismol which helped, but she reports no other over-the-counter medications for her stomach or GI tract.  She uses Motrin about once per week.  She took a home UPT that was negative.  She denies new medications or changes to medications.  She denies difficulties eating.          ROS:  Constitutional: No fevers, chills, No weight loss, normal appetite  GI: see HPI  Derm: No rash or lesions  MSK: No arthritis or joint swelling  : No dysuria, No frequent urination      Medical History:  has a past medical history of Abnormal Pap smear of cervix (2016, 2017), Allergic rhinitis, Anemia, Gallstone, Genital herpes (2012), Hemoglobin C disease, and Sickle cell disease, type SC.    Surgical History:  has a past surgical history that includes Appendectomy; Splenectomy, total; Foot surgery (Left); and Marvin tooth extraction.    Family History: family  history includes Breast cancer in an other family member; Cancer in her father and paternal grandfather; Diabetes in an other family member; Hypertension in her father; Ovarian cancer in an other family member.    Social History:  reports that she has been smoking cigarettes. She has a 2.5 pack-year smoking history. She has been exposed to tobacco smoke. She has never used smokeless tobacco. She reports current alcohol use of about 1.0 standard drink of alcohol per week. She reports that she does not use drugs.    Review of patient's allergies indicates:   Allergen Reactions    Prednisone      Sickle cell crisis     Doxycycline      GI Upset with taking this medication     Nickel Rash       Prior to Admission medications    Medication Sig Start Date End Date Taking? Authorizing Provider   azelastine (ASTELIN) 137 mcg (0.1 %) nasal spray 1 spray (137 mcg total) by Nasal route 2 (two) times daily. 12/2/22  Yes Jeffery Chawla MD   folic acid (FOLVITE) 1 MG tablet Take 1 mg by mouth once daily.   Yes Provider, Historical   ibuprofen (ADVIL,MOTRIN) 600 MG tablet TAKE 1 TABLET BY MOUTH EVERY 6 HOURS 8/16/23  Yes Jeffery Chawla MD   ipratropium (ATROVENT) 21 mcg (0.03 %) nasal spray 2 sprays by Each Nostril route 2 (two) times daily. 5/30/23  Yes Jeffery Chawla MD   levocetirizine (XYZAL) 5 MG tablet Take 1 tablet (5 mg total) by mouth once daily. 5/17/23  Yes Jfefery Chawla MD   moxifloxacin (VIGAMOX) 0.5 % ophthalmic solution Instill one drop into the right eye every two hours while awake 3/23/23  Yes Jaylon Crisostomo, NATHAN   valACYclovir (VALTREX) 1000 MG tablet TAKE 1 TABLET (1,000 MG TOTAL) BY MOUTH EVERY 12 (TWELVE) HOURS. FOR 3 DAYS AS NEEDED 10/3/23  Yes Jeffery Chwala MD   triamcinolone acetonide 0.1% (KENALOG) 0.1 % cream Apply topically 2 (two) times daily. for 10 days 5/30/23 6/9/23  Jeffery Chawla MD   ciprofloxacin-dexAMETHasone 0.3-0.1% (CIPRODEX) 0.3-0.1 % pS Place 4 drops BID into affected ear x  "10 days  Patient not taking: Reported on 11/6/2023 5/8/23 11/15/23  Donna Akthar MD       Objective Findings:  Vital Signs:  /78   Pulse 88   Ht 5' 9" (1.753 m)   Wt 77.6 kg (171 lb 1.2 oz)   LMP 10/28/2023   BMI 25.26 kg/m²   Body mass index is 25.26 kg/m².      Physical Exam:  General Appearance:  Well appearing in no acute distress, appears stated age  Head:  Normocephalic, atraumatic  Eyes:  No scleral icterus or pallor, EOMI  Abdomen:  Soft, non tender, non distended. No hepatosplenomegaly, ascites, or mass          Labs:  Lab Results   Component Value Date    WBC 11.63 05/30/2023    HGB 11.7 (L) 05/30/2023    HCT 33.3 (L) 05/30/2023    MCV 83 05/30/2023    RDW 14.8 (H) 05/30/2023     05/30/2023    GRAN 6.2 05/30/2023    GRAN 53.3 05/30/2023    LYMPH 4.0 05/30/2023    LYMPH 34.5 05/30/2023    MONO 1.0 05/30/2023    MONO 8.7 05/30/2023    EOS 0.3 05/30/2023    BASO 0.08 05/30/2023     Lab Results   Component Value Date     04/12/2022    K 3.9 04/12/2022     04/12/2022    CO2 26 04/12/2022    GLU 85 04/12/2022    BUN 6 04/12/2022    CREATININE 0.7 04/12/2022    CALCIUM 9.9 04/12/2022    PROT 7.9 04/12/2022    ALBUMIN 4.7 04/12/2022    BILITOT 1.1 (H) 04/12/2022    ALKPHOS 74 04/12/2022    AST 20 04/12/2022    ALT 14 04/12/2022               Imaging:  Ultrasound of the right upper quadrant abdomen 03/15/2018:   Findings: The pancreas and abdominal aorta are obscured by bowel gas. The inferior vena cava is normal. The liver has normal homogeneous echogenicity with no mass, intrahepatic or extrahepatic biliary ductal dilation.  It measures 17.4 cm in greatest diameter.  The common bile duct measures 4-5 mm.  The gallbladder is normally distended with a single mobile stone measuring 7 mm and a polyp versus adherent stone measuring 3 mm. The gallbladder wall thickness is 3-4 mm. There is no pericholecystic fluid or wall hyperemia. The patient does not have a positive Crooks sign.  The " right kidney has normal corticomedullary echogenicity with no mass, hydronephrosis or nephrolithiasis.  It measures 11.9 cm in greatest diameter. There is a 1.1 cm simple cyst in the lower pole of the right kidney.  IMPRESSION:    Findings compatible with cholelithiasis without evidence of acute cholecystitis. There may be a gallbladder polyp versus adherent stone also within the gallbladder lumen.            Assessment:  Gautam Sotomayor is a 38 y.o. female with:  1. Lower abdominal pain    2. Bloating    3. Diarrhea of presumed infectious origin    4. Calculus of gallbladder without cholecystitis without obstruction      She is had 2 episodes of nausea, abdominal cramping, diarrhea, and bloating over the past month.  Symptoms seem most consistent with an infectious origin.  Her 5-year-old son also had similar symptoms.  She has gallstones, but I think this is less likely.      Recommendations/Plan:  1. I will get blood, stool, and urine tests as noted below  2. I will get an ultrasound of the gallbladder  3. She may use simethicone for relief of bloating      Follow-up as needed in GI clinic.      Order summary:  Orders Placed This Encounter    US Abdomen Limited (GALLBLADDER)    CBC Auto Differential    Comprehensive Metabolic Panel    C-reactive protein    Celiac Disease Panel    Giardia / Cryptosporidum, EIA    Stool Exam-Ova,Cysts,Parasites    WBC, Stool    H. pylori antigen, stool    Pregnancy, urine rapid         Thank you so much for allowing me to participate in the care of Gautam Sotomayor        Valerio Samano MD

## 2023-11-15 NOTE — PROGRESS NOTES
"GENERAL GI PATIENT INTAKE:    COVID symptoms in the last 7 days (runny nose, sore throat, congestion, cough, fever): No  PCP: Jeffery Chawla  If not PCP-  number given to establish [phone]: No    ALLERGIES REVIEWED:  Yes    CHIEF COMPLAINT:    Chief Complaint   Patient presents with    Abdominal Cramping    Emesis    Bloated       VITAL SIGNS:  Ht 5' 9" (1.753 m)   Wt 77.6 kg (171 lb 1.2 oz)   LMP 10/28/2023   BMI 25.26 kg/m²      Change in medical, surgical, family or social history: No      REVIEWED MEDICATION LIST RECONCILED INCLUDING ABOVE MEDS:  Yes   "

## 2023-11-17 ENCOUNTER — PATIENT MESSAGE (OUTPATIENT)
Dept: GASTROENTEROLOGY | Facility: CLINIC | Age: 38
End: 2023-11-17
Payer: COMMERCIAL

## 2023-11-18 ENCOUNTER — HOSPITAL ENCOUNTER (OUTPATIENT)
Dept: RADIOLOGY | Facility: HOSPITAL | Age: 38
Discharge: HOME | End: 2023-11-18
Attending: INTERNAL MEDICINE
Payer: COMMERCIAL

## 2023-11-18 DIAGNOSIS — R14.0 BLOATING: ICD-10-CM

## 2023-11-18 DIAGNOSIS — K80.20 CALCULUS OF GALLBLADDER WITHOUT CHOLECYSTITIS WITHOUT OBSTRUCTION: ICD-10-CM

## 2023-11-18 DIAGNOSIS — R19.7 DIARRHEA OF PRESUMED INFECTIOUS ORIGIN: ICD-10-CM

## 2023-11-18 PROCEDURE — 76705 US ABDOMEN LIMITED: ICD-10-PCS | Mod: 26,,, | Performed by: RADIOLOGY

## 2023-11-18 PROCEDURE — 76705 ECHO EXAM OF ABDOMEN: CPT | Mod: TC

## 2023-11-18 PROCEDURE — 76705 ECHO EXAM OF ABDOMEN: CPT | Mod: 26,,, | Performed by: RADIOLOGY

## 2023-11-21 LAB
GLIADIN PEPTIDE IGA SER-ACNC: <0.2 U/ML
GLIADIN PEPTIDE IGG SER-ACNC: <0.6 U/ML
IGA SERPL-MCNC: <6 MG/DL (ref 70–400)
TTG IGA SER-ACNC: <0.2 U/ML
TTG IGG SER-ACNC: <0.6 U/ML

## 2023-11-24 ENCOUNTER — LAB VISIT (OUTPATIENT)
Dept: PRIMARY CARE CLINIC | Facility: CLINIC | Age: 38
End: 2023-11-24
Payer: COMMERCIAL

## 2023-11-24 DIAGNOSIS — R14.0 BLOATING: ICD-10-CM

## 2023-11-24 DIAGNOSIS — K80.20 CALCULUS OF GALLBLADDER WITHOUT CHOLECYSTITIS WITHOUT OBSTRUCTION: ICD-10-CM

## 2023-11-24 DIAGNOSIS — R19.7 DIARRHEA OF PRESUMED INFECTIOUS ORIGIN: ICD-10-CM

## 2023-11-24 PROCEDURE — 87329 GIARDIA AG IA: CPT | Performed by: INTERNAL MEDICINE

## 2023-11-24 PROCEDURE — 87338 HPYLORI STOOL AG IA: CPT | Performed by: INTERNAL MEDICINE

## 2023-11-24 PROCEDURE — 89055 LEUKOCYTE ASSESSMENT FECAL: CPT | Performed by: INTERNAL MEDICINE

## 2023-11-24 PROCEDURE — 87209 SMEAR COMPLEX STAIN: CPT | Performed by: INTERNAL MEDICINE

## 2023-11-25 LAB
CRYPTOSP AG STL QL IA: NEGATIVE
G LAMBLIA AG STL QL IA: NEGATIVE
WBC #/AREA STL HPF: NORMAL /[HPF]

## 2023-11-27 LAB — O+P STL MICRO: NORMAL

## 2023-11-30 LAB — H PYLORI AG STL QL IA: NOT DETECTED

## 2023-12-08 ENCOUNTER — PATIENT MESSAGE (OUTPATIENT)
Dept: GASTROENTEROLOGY | Facility: CLINIC | Age: 38
End: 2023-12-08
Payer: COMMERCIAL

## 2023-12-08 DIAGNOSIS — D80.2 IGA DEFICIENCY: Primary | ICD-10-CM

## 2023-12-09 DIAGNOSIS — M25.569 KNEE PAIN, UNSPECIFIED CHRONICITY, UNSPECIFIED LATERALITY: ICD-10-CM

## 2023-12-09 NOTE — TELEPHONE ENCOUNTER
No care due was identified.  Health [facility] Embedded Care Due Messages. Reference number: [id number].   12/09/2023 7:47:49 AM CST

## 2023-12-21 RX ORDER — IBUPROFEN 600 MG/1
600 TABLET ORAL EVERY 6 HOURS
Qty: 90 TABLET | Refills: 1 | Status: SHIPPED | OUTPATIENT
Start: 2023-12-21 | End: 2024-03-06

## 2024-01-31 ENCOUNTER — OFFICE VISIT (OUTPATIENT)
Dept: URGENT CARE | Facility: CLINIC | Age: 39
End: 2024-01-31
Payer: COMMERCIAL

## 2024-01-31 VITALS
TEMPERATURE: 99 F | HEIGHT: 69 IN | BODY MASS INDEX: 24.44 KG/M2 | DIASTOLIC BLOOD PRESSURE: 80 MMHG | HEART RATE: 111 BPM | WEIGHT: 165 LBS | RESPIRATION RATE: 16 BRPM | SYSTOLIC BLOOD PRESSURE: 128 MMHG

## 2024-01-31 DIAGNOSIS — R53.83 FATIGUE, UNSPECIFIED TYPE: ICD-10-CM

## 2024-01-31 DIAGNOSIS — B34.9 VIRAL SYNDROME: Primary | ICD-10-CM

## 2024-01-31 LAB
CTP QC/QA: YES
CTP QC/QA: YES
FLUAV AG NPH QL: NEGATIVE
FLUBV AG NPH QL: NEGATIVE
SARS-COV-2 AG RESP QL IA.RAPID: NEGATIVE

## 2024-01-31 PROCEDURE — 99204 OFFICE O/P NEW MOD 45 MIN: CPT | Mod: S$GLB,,, | Performed by: NURSE PRACTITIONER

## 2024-01-31 PROCEDURE — 87811 SARS-COV-2 COVID19 W/OPTIC: CPT | Mod: QW,S$GLB,, | Performed by: NURSE PRACTITIONER

## 2024-01-31 PROCEDURE — 87804 INFLUENZA ASSAY W/OPTIC: CPT | Mod: 59,QW,, | Performed by: NURSE PRACTITIONER

## 2024-01-31 NOTE — PROGRESS NOTES
"Subjective:      Patient ID: Gautam Sotomayor is a 38 y.o. female.    Vitals:  height is 5' 9" (1.753 m) and weight is 74.8 kg (165 lb). Her temperature is 98.9 °F (37.2 °C). Her blood pressure is 128/80 and her pulse is 111 (abnormal). Her respiration is 16.     Chief Complaint: Fatigue    Gautam Sotomayor is a 38 year old female presenting to the clinic with a four day history of ear pain, headache, and body aches. Her son has been ill for the same length of time. She has had no vomiting or diarrhea and is tolerating PO intake without difficulty.     Fatigue  This is a new problem. Episode onset: x 4 days. The problem has been gradually worsening. Associated symptoms include headaches and myalgias.       Constitution: Negative.   HENT:  Positive for ear pain.    Neck: neck negative.   Cardiovascular: Negative.    Respiratory: Negative.     Gastrointestinal: Negative.    Genitourinary: Negative.    Musculoskeletal:  Positive for muscle ache.   Skin: Negative.    Neurological:  Positive for headaches.      Objective:     Physical Exam   Constitutional: She is oriented to person, place, and time. She appears well-developed. She is cooperative.   HENT:   Head: Normocephalic and atraumatic.   Ears:   Right Ear: Hearing, tympanic membrane, external ear and ear canal normal.   Left Ear: Hearing, tympanic membrane, external ear and ear canal normal.   Nose: Nose normal. No mucosal edema or nasal deformity. No epistaxis. Right sinus exhibits no maxillary sinus tenderness and no frontal sinus tenderness. Left sinus exhibits no maxillary sinus tenderness and no frontal sinus tenderness.   Mouth/Throat: Uvula is midline, oropharynx is clear and moist and mucous membranes are normal. No trismus in the jaw. Normal dentition. No uvula swelling.   Eyes: Conjunctivae and lids are normal.   Neck: Trachea normal and phonation normal. Neck supple.   Cardiovascular: Normal rate, regular rhythm, normal heart sounds and normal " pulses.   Pulmonary/Chest: Effort normal and breath sounds normal.   Abdominal: Normal appearance.   Musculoskeletal: Normal range of motion.         General: Normal range of motion.   Neurological: She is alert and oriented to person, place, and time. She exhibits normal muscle tone.   Skin: Skin is warm, dry and intact.   Psychiatric: Her speech is normal and behavior is normal. Judgment and thought content normal.   Nursing note and vitals reviewed.    Assessment:     1. Viral syndrome    2. Fatigue, unspecified type        Plan:     The patient appears to have a viral upper respiratory infection with a viral syndrome.  Based upon the history and physical exam the patient does not appear to have a serious bacterial infection such as pneumonia, sepsis, otitis media, bacterial sinusitis, strep pharyngitis, parapharyngeal or peritonsillar abscess, meningitis.  I do not think the patient needs antibiotics as their illness likely has a viral etiology.  Patient appears very well and I have given specific return precautions to the patient and/or family members.  I have instructed the patient to hydrate, take over the counter medications and follow up with their regular doctor or the one provided.    Viral syndrome    Fatigue, unspecified type  -     SARS Coronavirus 2 Antigen, POCT Manual Read  -     POCT Influenza A/B Rapid Antigen

## 2024-01-31 NOTE — LETTER
January 31, 2024      [facility]  [address] [address] [id number]       Patient: Gautam Sotomayor   YOB: 1985  Date of Visit: 01/31/2024    To Whom It May Concern:    Janet Sotomayor  was at [facility] on 01/31/2024. The patient may return to work/school on 2-1-24 with no restrictions. If you have any questions or concerns, or if I can be of further assistance, please do not hesitate to contact me.    Sincerely,    Teresa Washington NP

## 2024-02-07 ENCOUNTER — LAB VISIT (OUTPATIENT)
Dept: PRIMARY CARE CLINIC | Facility: CLINIC | Age: 39
End: 2024-02-07
Payer: COMMERCIAL

## 2024-02-07 DIAGNOSIS — D80.2 IGA DEFICIENCY: ICD-10-CM

## 2024-02-07 PROCEDURE — 82784 ASSAY IGA/IGD/IGG/IGM EACH: CPT | Mod: 59 | Performed by: INTERNAL MEDICINE

## 2024-02-07 PROCEDURE — 36415 COLL VENOUS BLD VENIPUNCTURE: CPT | Performed by: INTERNAL MEDICINE

## 2024-02-08 LAB
IGA SERPL-MCNC: <5 MG/DL (ref 40–350)
IGG SERPL-MCNC: 1347 MG/DL (ref 650–1600)
IGM SERPL-MCNC: 20 MG/DL (ref 50–300)

## 2024-02-23 ENCOUNTER — PATIENT MESSAGE (OUTPATIENT)
Dept: PRIMARY CARE CLINIC | Facility: CLINIC | Age: 39
End: 2024-02-23
Payer: COMMERCIAL

## 2024-03-06 ENCOUNTER — HOSPITAL ENCOUNTER (EMERGENCY)
Facility: OTHER | Age: 39
Discharge: HOME | End: 2024-03-06
Attending: EMERGENCY MEDICINE
Payer: COMMERCIAL

## 2024-03-06 VITALS
SYSTOLIC BLOOD PRESSURE: 120 MMHG | OXYGEN SATURATION: 100 % | HEART RATE: 95 BPM | TEMPERATURE: 99 F | DIASTOLIC BLOOD PRESSURE: 85 MMHG | RESPIRATION RATE: 118 BRPM

## 2024-03-06 DIAGNOSIS — M25.569 KNEE PAIN, UNSPECIFIED CHRONICITY, UNSPECIFIED LATERALITY: ICD-10-CM

## 2024-03-06 DIAGNOSIS — J18.9 COMMUNITY ACQUIRED PNEUMONIA, UNSPECIFIED LATERALITY: Primary | ICD-10-CM

## 2024-03-06 DIAGNOSIS — R07.89 CHEST WALL PAIN: ICD-10-CM

## 2024-03-06 DIAGNOSIS — R07.9 CHEST PAIN: ICD-10-CM

## 2024-03-06 LAB
ALBUMIN SERPL BCP-MCNC: 4.1 G/DL (ref 3.5–5.2)
ALP SERPL-CCNC: 71 U/L (ref 55–135)
ALT SERPL W/O P-5'-P-CCNC: 17 U/L (ref 10–44)
ANION GAP SERPL CALC-SCNC: 7 MMOL/L (ref 8–16)
ANISOCYTOSIS BLD QL SMEAR: SLIGHT
AST SERPL-CCNC: 23 U/L (ref 10–40)
B-HCG UR QL: NEGATIVE
BASOPHILS # BLD AUTO: 0.1 K/UL (ref 0–0.2)
BASOPHILS NFR BLD: 0.6 % (ref 0–1.9)
BILIRUB SERPL-MCNC: 0.5 MG/DL (ref 0.1–1)
BUN SERPL-MCNC: 9 MG/DL (ref 6–20)
CALCIUM SERPL-MCNC: 9.3 MG/DL (ref 8.7–10.5)
CHLORIDE SERPL-SCNC: 106 MMOL/L (ref 95–110)
CO2 SERPL-SCNC: 25 MMOL/L (ref 23–29)
CREAT SERPL-MCNC: 0.8 MG/DL (ref 0.5–1.4)
CTP QC/QA: YES
D DIMER PPP IA.FEU-MCNC: 1.92 MG/L FEU
DIFFERENTIAL METHOD BLD: ABNORMAL
EOSINOPHIL # BLD AUTO: 0.4 K/UL (ref 0–0.5)
EOSINOPHIL NFR BLD: 2.8 % (ref 0–8)
ERYTHROCYTE [DISTWIDTH] IN BLOOD BY AUTOMATED COUNT: 14 % (ref 11.5–14.5)
EST. GFR  (NO RACE VARIABLE): >60 ML/MIN/1.73 M^2
GIANT PLATELETS BLD QL SMEAR: PRESENT
GLUCOSE SERPL-MCNC: 89 MG/DL (ref 70–110)
HCT VFR BLD AUTO: 30.4 % (ref 37–48.5)
HGB BLD-MCNC: 10.9 G/DL (ref 12–16)
IMM GRANULOCYTES # BLD AUTO: 0.04 K/UL (ref 0–0.04)
IMM GRANULOCYTES NFR BLD AUTO: 0.3 % (ref 0–0.5)
LYMPHOCYTES # BLD AUTO: 3.4 K/UL (ref 1–4.8)
LYMPHOCYTES NFR BLD: 21.6 % (ref 18–48)
MCH RBC QN AUTO: 29.5 PG (ref 27–31)
MCHC RBC AUTO-ENTMCNC: 35.9 G/DL (ref 32–36)
MCV RBC AUTO: 82 FL (ref 82–98)
MONOCYTES # BLD AUTO: 1.4 K/UL (ref 0.3–1)
MONOCYTES NFR BLD: 9.1 % (ref 4–15)
NEUTROPHILS # BLD AUTO: 10.2 K/UL (ref 1.8–7.7)
NEUTROPHILS NFR BLD: 65.6 % (ref 38–73)
NRBC BLD-RTO: 0 /100 WBC
PLATELET # BLD AUTO: 325 K/UL (ref 150–450)
PLATELET BLD QL SMEAR: ABNORMAL
PMV BLD AUTO: 11.5 FL (ref 9.2–12.9)
POC MOLECULAR INFLUENZA A AGN: NEGATIVE
POC MOLECULAR INFLUENZA B AGN: NEGATIVE
POIKILOCYTOSIS BLD QL SMEAR: SLIGHT
POTASSIUM SERPL-SCNC: 4.1 MMOL/L (ref 3.5–5.1)
PROT SERPL-MCNC: 7.5 G/DL (ref 6–8.4)
RBC # BLD AUTO: 3.7 M/UL (ref 4–5.4)
SARS-COV-2 RDRP RESP QL NAA+PROBE: NEGATIVE
SODIUM SERPL-SCNC: 138 MMOL/L (ref 136–145)
TARGETS BLD QL SMEAR: ABNORMAL
TROPONIN I SERPL DL<=0.01 NG/ML-MCNC: <0.006 NG/ML (ref 0–0.03)
WBC # BLD AUTO: 15.49 K/UL (ref 3.9–12.7)

## 2024-03-06 PROCEDURE — 25000003 PHARM REV CODE 250: Performed by: EMERGENCY MEDICINE

## 2024-03-06 PROCEDURE — 81025 URINE PREGNANCY TEST: CPT | Performed by: EMERGENCY MEDICINE

## 2024-03-06 PROCEDURE — 99285 EMERGENCY DEPT VISIT HI MDM: CPT | Mod: 25

## 2024-03-06 PROCEDURE — 63700000 PHARM REV CODE 250 ALT 637 W/O HCPCS: Performed by: EMERGENCY MEDICINE

## 2024-03-06 PROCEDURE — 96361 HYDRATE IV INFUSION ADD-ON: CPT

## 2024-03-06 PROCEDURE — 80053 COMPREHEN METABOLIC PANEL: CPT | Performed by: PHYSICIAN ASSISTANT

## 2024-03-06 PROCEDURE — 93010 ELECTROCARDIOGRAM REPORT: CPT | Mod: ,,, | Performed by: INTERNAL MEDICINE

## 2024-03-06 PROCEDURE — 25500020 PHARM REV CODE 255: Performed by: EMERGENCY MEDICINE

## 2024-03-06 PROCEDURE — 63600175 PHARM REV CODE 636 W HCPCS: Performed by: EMERGENCY MEDICINE

## 2024-03-06 PROCEDURE — 84484 ASSAY OF TROPONIN QUANT: CPT | Performed by: PHYSICIAN ASSISTANT

## 2024-03-06 PROCEDURE — 96374 THER/PROPH/DIAG INJ IV PUSH: CPT

## 2024-03-06 PROCEDURE — 87635 SARS-COV-2 COVID-19 AMP PRB: CPT | Performed by: EMERGENCY MEDICINE

## 2024-03-06 PROCEDURE — 85025 COMPLETE CBC W/AUTO DIFF WBC: CPT | Performed by: PHYSICIAN ASSISTANT

## 2024-03-06 PROCEDURE — 85379 FIBRIN DEGRADATION QUANT: CPT | Performed by: EMERGENCY MEDICINE

## 2024-03-06 PROCEDURE — 93005 ELECTROCARDIOGRAM TRACING: CPT

## 2024-03-06 RX ORDER — AMOXICILLIN AND CLAVULANATE POTASSIUM 875; 125 MG/1; MG/1
1 TABLET, FILM COATED ORAL
Status: COMPLETED | OUTPATIENT
Start: 2024-03-06 | End: 2024-03-06

## 2024-03-06 RX ORDER — AMOXICILLIN AND CLAVULANATE POTASSIUM 875; 125 MG/1; MG/1
1 TABLET, FILM COATED ORAL 2 TIMES DAILY
Qty: 14 TABLET | Refills: 0 | Status: SHIPPED | OUTPATIENT
Start: 2024-03-06

## 2024-03-06 RX ORDER — MORPHINE SULFATE 4 MG/ML
4 INJECTION, SOLUTION INTRAMUSCULAR; INTRAVENOUS
Status: COMPLETED | OUTPATIENT
Start: 2024-03-06 | End: 2024-03-06

## 2024-03-06 RX ORDER — CYCLOBENZAPRINE HCL 10 MG
10 TABLET ORAL 3 TIMES DAILY PRN
Qty: 15 TABLET | Refills: 0 | Status: SHIPPED | OUTPATIENT
Start: 2024-03-06 | End: 2024-03-11

## 2024-03-06 RX ORDER — AZITHROMYCIN 250 MG/1
250 TABLET, FILM COATED ORAL DAILY
Qty: 4 TABLET | Refills: 0 | Status: SHIPPED | OUTPATIENT
Start: 2024-03-07 | End: 2024-03-11

## 2024-03-06 RX ORDER — LIDOCAINE 50 MG/G
1 PATCH TOPICAL
Status: DISCONTINUED | OUTPATIENT
Start: 2024-03-06 | End: 2024-03-07 | Stop reason: HOSPADM

## 2024-03-06 RX ORDER — LIDOCAINE 50 MG/G
1 PATCH TOPICAL DAILY
Qty: 5 PATCH | Refills: 0 | Status: SHIPPED | OUTPATIENT
Start: 2024-03-06

## 2024-03-06 RX ORDER — IBUPROFEN 600 MG/1
600 TABLET ORAL EVERY 6 HOURS PRN
Qty: 20 TABLET | Refills: 0 | Status: SHIPPED | OUTPATIENT
Start: 2024-03-06 | End: 2024-03-11

## 2024-03-06 RX ORDER — CYCLOBENZAPRINE HCL 10 MG
10 TABLET ORAL
Status: COMPLETED | OUTPATIENT
Start: 2024-03-06 | End: 2024-03-06

## 2024-03-06 RX ORDER — AZITHROMYCIN 250 MG/1
500 TABLET, FILM COATED ORAL
Status: COMPLETED | OUTPATIENT
Start: 2024-03-06 | End: 2024-03-06

## 2024-03-06 RX ADMIN — LIDOCAINE 1 PATCH: 50 PATCH CUTANEOUS at 10:03

## 2024-03-06 RX ADMIN — SODIUM CHLORIDE, POTASSIUM CHLORIDE, SODIUM LACTATE AND CALCIUM CHLORIDE 1000 ML: 600; 310; 30; 20 INJECTION, SOLUTION INTRAVENOUS at 08:03

## 2024-03-06 RX ADMIN — AZITHROMYCIN DIHYDRATE 500 MG: 250 TABLET, FILM COATED ORAL at 10:03

## 2024-03-06 RX ADMIN — CYCLOBENZAPRINE HYDROCHLORIDE 10 MG: 10 TABLET, FILM COATED ORAL at 07:03

## 2024-03-06 RX ADMIN — AMOXICILLIN AND CLAVULANATE POTASSIUM 1 TABLET: 875; 125 TABLET, FILM COATED ORAL at 10:03

## 2024-03-06 RX ADMIN — MORPHINE SULFATE 4 MG: 4 INJECTION, SOLUTION INTRAMUSCULAR; INTRAVENOUS at 08:03

## 2024-03-06 RX ADMIN — IOHEXOL 100 ML: 350 INJECTION, SOLUTION INTRAVENOUS at 08:03

## 2024-03-06 NOTE — Clinical Note
"Gautam"Francesco Ramirez was seen and treated in our emergency department on 3/6/2024.  She may return to work on 03/09/2024.       If you have any questions or concerns, please don't hesitate to call.      Renzo Damon MD"

## 2024-03-07 NOTE — DISCHARGE INSTRUCTIONS
You have evidence of a lung infection or pneumonia.  You received your 1st dose of antibiotics while in the ER.  Continue taking both Augmentin and azithromycin.  Azithromycin is dosed once daily for the next 4 days.  Augmentin is twice a day for the next week.    Your chest pain is from a muscular strain from coughing from your pneumonia.  You can use topical lidocaine patches to help with numbing.  Warm compresses or warm showers can also help with that pain.  Take ibuprofen every 6 hours as needed for pain.  Use Flexeril every 8 hours as needed for spasms.  Flexeril is a muscle relaxant and will make you drowsy and do not take it before driving. Do not use medication when you are operating heavy machinery, driving, or working because the medication may be sedating. Do not take medication sooner than the frequency as directed.     Return to the ER for any worsening pain, shortness of breath, high fever that does not respond to ibuprofen, or other concerning symptoms.

## 2024-03-07 NOTE — ED TRIAGE NOTES
Productive cough since Sunday with sharp, left sided CP since last night. States pain worse when coughing and when lying flat. Denies N/V, SOB. Reports yellow sputum with no reported fever. Presents awake, alert.

## 2024-03-07 NOTE — FIRST PROVIDER EVALUATION
Emergency Department TeleTriage Encounter Note      CHIEF COMPLAINT    Chief Complaint   Patient presents with    Chest Pain     Pt c/o L sided CP radiating to L arm that started last night at 2200. Reports she has had a productive cough since Saturday.        VITAL SIGNS   Initial Vitals [03/06/24 1800]   BP Pulse Resp Temp SpO2   127/68 104 18 98.7 °F (37.1 °C) 100 %      MAP       --            ALLERGIES    Review of patient's allergies indicates:   Allergen Reactions    Prednisone      Sickle cell crisis     Doxycycline      GI Upset with taking this medication     Nickel Rash       PROVIDER TRIAGE NOTE    38 year old female presents with cp - left sided. Also has a cough.     PE:  Patient alert and oriented. No acute distress    Initial orders will be placed and care will be transferred to an alternate provider when patient is roomed for a full evaluation. Any additional orders and the final disposition will be determined by that provider.        ORDERS  Labs Reviewed   SARS-COV-2 RDRP GENE   POCT INFLUENZA A/B MOLECULAR       ED Orders (720h ago, onward)      Start Ordered     Status Ordering Provider    03/06/24 1811 03/06/24 1810  POCT COVID-19 Rapid Screening  Once         Final result JAGDISH AG    03/06/24 1811 03/06/24 1810  POCT Influenza A/B Molecular  Once         Final result JAGDISH AG    03/06/24 1803 03/06/24 1802  EKG 12-lead (Chest Pain) Age >30  Once        Comments: If patient > 30 yrs.    Completed by HAYLEY MORRIS on 3/6/2024 at  6:06 PM UMER ANDRADE              Virtual Visit Note: The provider triage portion of this emergency department evaluation and documentation was performed via [employer], a HIPAA-compliant telemedicine application, in concert with a tele-presenter in the room. A face to face patient evaluation with one of my colleagues will occur once the patient is placed in an emergency department room.      DISCLAIMER: This note was prepared with M*Modal  voice recognition transcription software. Garbled syntax, mangled pronouns, and other bizarre constructions may be attributed to that software system.

## 2024-03-07 NOTE — ED PROVIDER NOTES
"Encounter Date: 3/6/2024       History     Chief Complaint   Patient presents with    Chest Pain     Pt c/o L sided CP radiating to L arm that started last night at 2200. Reports she has had a productive cough since Saturday.      39 yo W with pmhx SC trait, anemia, gallstone, lumpectomy presents with a chief complaint of chest pain.  Patient notes she has been suffering from a cough for the past 4 days.  Occasionally productive of sputum.  Beginning last night while in bed she felt an episode of severe pain in the left chest.  This was that 10:00 p.m..  She had 2 sit semi-supported which improved her pain somewhat.  However, she notes she has had persistent pain in the left chest radiating down the left arm described as "tightness" since that time.  She worked today but given persistence of symptoms she came to the ER.  She reports some associated shortness of breath.  No immobilization or recent travel.  No fevers.  No history of similar symptoms.  She took ibuprofen without relief of symptoms.        Review of patient's allergies indicates:   Allergen Reactions    Prednisone      Sickle cell crisis     Doxycycline      GI Upset with taking this medication     Nickel Rash     Past Medical History:   Diagnosis Date    Abnormal Pap smear of cervix 2016, 2017    colpo    Allergic rhinitis     Anemia     Gallstone     Genital herpes 2012    Outbreaks 1-2 per year, last outbreak 8/2017    Hemoglobin C disease     Sickle cell disease, type SC     Last crisis 3/2017     Past Surgical History:   Procedure Laterality Date    APPENDECTOMY      FOOT SURGERY Left     SPLENECTOMY, TOTAL      WISDOM TOOTH EXTRACTION       Family History   Problem Relation Age of Onset    Hypertension Father     Cancer Father         Lung    Cancer Paternal Grandfather         Lung    Ovarian cancer Other     Breast cancer Other     Diabetes Other     Esophageal cancer Neg Hx     Colon cancer Neg Hx      Social History     Tobacco Use    Smoking " status: Every Day     Current packs/day: 0.25     Average packs/day: 0.3 packs/day for 10.0 years (2.5 ttl pk-yrs)     Types: Cigarettes     Passive exposure: Current    Smokeless tobacco: Never    Tobacco comments:     black and milds   Substance Use Topics    Alcohol use: Yes     Alcohol/week: 1.0 standard drink of alcohol     Types: 1 Cans of beer per week     Comment: socially    Drug use: No     Review of Systems    Physical Exam     Initial Vitals [03/06/24 1800]   BP Pulse Resp Temp SpO2   127/68 104 18 98.7 °F (37.1 °C) 100 %      MAP       --         Physical Exam    Nursing note and vitals reviewed.  Constitutional: She appears well-developed and well-nourished. She is not diaphoretic. No distress.   HENT:   Head: Normocephalic and atraumatic.   Eyes: Right eye exhibits no discharge. Left eye exhibits no discharge. No scleral icterus.   Neck: Neck supple. No JVD present.   Normal range of motion.  Cardiovascular:  Regular rhythm and normal heart sounds.   Tachycardia present.   Exam reveals no gallop and no friction rub.       No murmur heard.  Pulmonary/Chest: Breath sounds normal. No respiratory distress. She has no wheezes. She has no rhonchi. She has no rales. She exhibits tenderness (Reproducible left chest wall tenderness).   Abdominal: Abdomen is soft. She exhibits no distension and no mass. There is no abdominal tenderness. There is no rebound and no guarding.   Musculoskeletal:         General: No tenderness or edema. Normal range of motion.      Cervical back: Normal range of motion and neck supple.      Comments: Range of motion of left arm worsens left chest pain     Neurological: She is alert and oriented to person, place, and time. She has normal strength. No sensory deficit.   Skin: Skin is warm and dry. Capillary refill takes less than 2 seconds.   Psychiatric: Thought content normal.         ED Course   Procedures  Labs Reviewed   CBC W/ AUTO DIFFERENTIAL - Abnormal; Notable for the  following components:       Result Value    WBC 15.49 (*)     RBC 3.70 (*)     Hemoglobin 10.9 (*)     Hematocrit 30.4 (*)     Gran # (ANC) 10.2 (*)     Mono # 1.4 (*)     All other components within normal limits   COMPREHENSIVE METABOLIC PANEL - Abnormal; Notable for the following components:    Anion Gap 7 (*)     All other components within normal limits   D DIMER, QUANTITATIVE - Abnormal; Notable for the following components:    D-Dimer 1.92 (*)     All other components within normal limits   TROPONIN I   SARS-COV-2 RDRP GENE   POCT INFLUENZA A/B MOLECULAR   POCT URINE PREGNANCY     EKG Readings: (Independently Interpreted)   Initial Reading: No STEMI. Rhythm: Sinus Tachycardia. Heart Rate: 101. ST Segments: Normal ST Segments. T Waves: Normal. Axis: Normal.       Imaging Results               CTA Chest Non-Coronary (PE Studies) (Final result)  Result time 03/06/24 21:08:31      Final result by Gustavo Negro MD (03/06/24 21:08:31)                   Impression:      1. No evidence of pulmonary embolism  2. Bilateral small patchy areas of infiltrate or mild consolidation in upper and lower lobes favored represent multifocal infection/pneumonitis.  See above comments.  Follow-up recommended.  3. This report was flagged in Epic as abnormal.      Electronically signed by: Gustavo Negro  Date:    03/06/2024  Time:    21:08               Narrative:    EXAMINATION:  CTA CHEST NON CORONARY (PE STUDIES)    CLINICAL HISTORY:  Pulmonary embolism (PE) suspected, positive D-dimer;    TECHNIQUE:  Low dose axial images, sagittal and coronal reformations were obtained from the thoracic inlet to the lung bases following the IV administration of 100 mL of Omnipaque 350.  Contrast timing was optimized to evaluate the pulmonary arteries.  MIP images were performed.    COMPARISON:  None    FINDINGS:  Pulmonary arteries:Pulmonary arteries are adequately enhanced.No filling defects to indicate pulmonary embolism.    Thoracic soft  tissues: Unremarkable.    Aorta: Left-sided aortic arch.  No aneurysm or dissection.    Heart: Normal size. No effusion.    Mitra/Mediastinum: No pathologic pauly enlargement.    Airways: Patent.    Lungs/Pleura: There are bilateral multiple small patchy areas of infiltrate or consolidation in upper and lower lobes favored represent multifocal infection/pneumonitis.  This is most from the left upper lobe and right middle lobes.  Follow-up recommended.  Small underlying pulmonary nodule cannot be excluded.  Follow-up recommended.    Esophagus: Unremarkable.    Upper Abdomen: No abnormality of the partially imaged upper abdomen.  Few surgical clips in the left upper quadrant.    Bones: No acute fracture. No suspicious lytic or sclerotic lesions.                                       X-Ray Chest AP Portable (Final result)  Result time 03/06/24 19:16:43      Final result by Valerio Forrest MD (03/06/24 19:16:43)                   Impression:      Faint ill-defined airspace opacities projecting over the right and left mid lung zones which may relate to multifocal infectious process in the appropriate clinical setting.      Electronically signed by: Valerio Forrest MD  Date:    03/06/2024  Time:    19:16               Narrative:    EXAMINATION:  XR CHEST AP PORTABLE    CLINICAL HISTORY:  Chest pain, unspecified    TECHNIQUE:  Single frontal view of the chest was performed.    COMPARISON:  None    FINDINGS:  Cardiac silhouette is not significantly enlarged.  Lungs are symmetrically expanded.  Faint ill-defined airspace opacities projecting over the right and left midlung zones may relate to multifocal infection in the appropriate clinical setting.  No significant volume of pleural fluid or pneumothorax identified.  Surgical clips project over the left upper abdomen.  Osseous structures appear intact.                                       Medications   lactated ringers bolus 1,000 mL (1,000 mLs Intravenous New Bag 3/6/24  2051)   LIDOcaine 5 % patch 1 patch (has no administration in time range)   amoxicillin-clavulanate 875-125mg per tablet 1 tablet (has no administration in time range)   azithromycin tablet 500 mg (has no administration in time range)   cyclobenzaprine tablet 10 mg (10 mg Oral Given 3/6/24 1924)   morphine injection 4 mg (4 mg Intravenous Given 3/6/24 2017)   iohexoL (OMNIPAQUE 350) injection 100 mL (100 mLs Intravenous Given 3/6/24 2033)     Medical Decision Making  37 yo W with pmhx SC trait, anemia, gallstone, lumpectomy presents with a chief complaint of chest pain.    My differential includes, but is not limited to:  ACS, PE, COVID, influenza, pneumonia, muscular pain    Labs were initially ordered by provider in triage.  COVID and influenza have resulted as negative.  Send a D-dimer as well.  Will administer antispasmodics.    Radiology read of chest x-ray suspicious for multifocal infectious process with faint, ill-defined airspace opacities over the right than left mid lung zones.  On my independent interpretation, I do not see any pleural effusion, cardiomegaly, pneumothorax, pneumonia.    Reassessment:  Pregnancy test negative.  D-dimer elevated at 1.92, will obtain CTA chest.  CBC with leukocytosis of 15.5.  Hemoglobin 10.9.  CMP normal.  Troponin normal.  On repeat assessment, she appears well but reports pain unchanged, will administer morphine and IV fluids.    Reassessment:  CTA negative for PE.  It does reveal bilateral small patchy areas of infiltrate a mild consolidation suspicious for multifocal infection/pneumonitis.  Patient's repeat vitals revealed a heart rate of 112 and documented respirations 21 but was satting 99% on room air.  On repeat assessment, she is breathing very comfortably.  She does not appear septic.  On auscultation her heart rate is lower after only some of her fluids have been administered.  I suspect that patient's pain is musculoskeletal in the setting of pneumonia.  From a  pneumonia standpoint, I do not feel she warrants emergent hospitalization.  Will initiate course of Augmentin and azithromycin for community-acquired pneumonia.  First doses provided in ER.  With regards to her pain, will treat her as musculoskeletal with NSAIDs, antispasmodics, lidocaine patches.  She is comfortable with this plan.  Extensive return precautions provided.    Amount and/or Complexity of Data Reviewed  Labs: ordered.  Radiology: ordered.    Risk  Prescription drug management.                                      Clinical Impression:  Final diagnoses:  [R07.9] Chest pain  [J18.9] Community acquired pneumonia, unspecified laterality (Primary)  [R07.89] Chest wall pain          ED Disposition Condition    Discharge Stable          ED Prescriptions       Medication Sig Dispense Start Date End Date Auth. Provider    amoxicillin-clavulanate 875-125mg (AUGMENTIN) 875-125 mg per tablet Take 1 tablet by mouth 2 (two) times daily. 14 tablet 3/6/2024 -- Renzo Damon MD    azithromycin (Z-ADRIAN) 250 MG tablet Take 1 tablet (250 mg total) by mouth once daily. Take 2 tablets by mouth on day 1; Take 1 tablet by mouth on days 2-5 for 4 days 4 tablet 3/7/2024 3/11/2024 Renzo Damon MD    cyclobenzaprine (FLEXERIL) 10 MG tablet Take 1 tablet (10 mg total) by mouth 3 (three) times daily as needed for Muscle spasms. 15 tablet 3/6/2024 3/11/2024 Renzo Damon MD    LIDOcaine (LIDODERM) 5 % Place 1 patch onto the skin once daily. Remove & Discard patch within 12 hours or as directed by MD 5 patch 3/6/2024 -- Renzo Damon MD    ibuprofen (ADVIL,MOTRIN) 600 MG tablet Take 1 tablet (600 mg total) by mouth every 6 (six) hours as needed for Pain. 20 tablet 3/6/2024 3/11/2024 Renzo Damon MD          Follow-up Information       Follow up With Specialties Details Why Contact Info    Jeffery Chawla MD Family Medicine Schedule an appointment as soon as possible for a visit   [address]  [facility]  [address]  [phone]      [facility] Emergency Dept Emergency Medicine  As needed, If symptoms worsen [address] [id number] [phone]             Renzo Damon MD  03/06/24 4349

## 2024-03-08 LAB
OHS QRS DURATION: 70 MS
OHS QTC CALCULATION: 394 MS

## 2024-03-11 ENCOUNTER — OFFICE VISIT (OUTPATIENT)
Dept: PRIMARY CARE CLINIC | Facility: CLINIC | Age: 39
End: 2024-03-11
Payer: COMMERCIAL

## 2024-03-11 VITALS
HEART RATE: 91 BPM | HEIGHT: 69 IN | SYSTOLIC BLOOD PRESSURE: 122 MMHG | OXYGEN SATURATION: 99 % | WEIGHT: 173.5 LBS | BODY MASS INDEX: 25.7 KG/M2 | DIASTOLIC BLOOD PRESSURE: 78 MMHG

## 2024-03-11 DIAGNOSIS — Z23 ENCOUNTER FOR ADMINISTRATION OF VACCINE: ICD-10-CM

## 2024-03-11 DIAGNOSIS — H92.02 LEFT EAR PAIN: Primary | ICD-10-CM

## 2024-03-11 DIAGNOSIS — Z00.00 ANNUAL PHYSICAL EXAM: ICD-10-CM

## 2024-03-11 DIAGNOSIS — B37.31 YEAST VAGINITIS: ICD-10-CM

## 2024-03-11 DIAGNOSIS — Z90.81 POST-SPLENECTOMY: ICD-10-CM

## 2024-03-11 DIAGNOSIS — B96.89 BACTERIAL VAGINOSIS: ICD-10-CM

## 2024-03-11 DIAGNOSIS — R23.1 LIVEDO RETICULARIS: ICD-10-CM

## 2024-03-11 DIAGNOSIS — D57.20 SICKLE CELL-HEMOGLOBIN C DISEASE WITHOUT CRISIS: ICD-10-CM

## 2024-03-11 DIAGNOSIS — N76.0 BACTERIAL VAGINOSIS: ICD-10-CM

## 2024-03-11 PROCEDURE — 99999 PR PBB SHADOW E&M-EST. PATIENT-LVL III: CPT | Mod: PBBFAC,,, | Performed by: FAMILY MEDICINE

## 2024-03-11 PROCEDURE — 1159F MED LIST DOCD IN RCRD: CPT | Mod: CPTII,S$GLB,, | Performed by: FAMILY MEDICINE

## 2024-03-11 PROCEDURE — 3078F DIAST BP <80 MM HG: CPT | Mod: CPTII,S$GLB,, | Performed by: FAMILY MEDICINE

## 2024-03-11 PROCEDURE — 3008F BODY MASS INDEX DOCD: CPT | Mod: CPTII,S$GLB,, | Performed by: FAMILY MEDICINE

## 2024-03-11 PROCEDURE — 99213 OFFICE O/P EST LOW 20 MIN: CPT | Mod: 25,S$GLB,, | Performed by: FAMILY MEDICINE

## 2024-03-11 PROCEDURE — 3074F SYST BP LT 130 MM HG: CPT | Mod: CPTII,S$GLB,, | Performed by: FAMILY MEDICINE

## 2024-03-11 PROCEDURE — 99395 PREV VISIT EST AGE 18-39: CPT | Mod: S$GLB,,, | Performed by: FAMILY MEDICINE

## 2024-03-11 RX ORDER — FLUCONAZOLE 150 MG/1
150 TABLET ORAL ONCE
Qty: 1 TABLET | Refills: 0 | Status: SHIPPED | OUTPATIENT
Start: 2024-03-11 | End: 2024-03-11

## 2024-03-11 NOTE — PROGRESS NOTES
Clinic Note  3/12/2024      Subjective:       Patient ID:  Gautam is a 38 y.o. female being seen for an established visit.    Chief Complaint: Annual Exam    Annual exam-patient with a history of sickle cell hemoglobin C lichen planus allergic rhinitis, thyroid nodule, gallstones, post splenectomy here for annual exam and several concerns    Abdominal fullness-being followed by Gastroenterology, recently had labs and stool studies and wanting to review test.  Was told to take Gas-X    Headaches-patient reports having daily tension headaches over last several months.  Just got new glasses 3 months ago.  Patient does sleep about 7 hours per night,  states that patient does snore.  Patient hello eyes feels tired, has a 5-year-old at home.  Is stressed out from her job.  Denies photophobia, phonophobia, nausea, vomiting.  Headaches do improved some when taking Motrin.  Denies any headaches today.      Livedo reticularis - patient reports having skin discoloration of her bilateral feet over last year, does not cause any pain or discomfort.    Recent pneumonia-recently treated for pneumonia with Augmentin and azithromycin, overall respiratory symptoms are improving.  Requesting medication for yeast vaginitis    E coli-patient in monogamous relationship with , and  recently tested positive for E coli UTI.  Wondering if this could be related to sex    Review of Systems   Constitutional:  Negative for chills, fever, malaise/fatigue and weight loss.   HENT:  Negative for congestion, sinus pain and sore throat.    Respiratory:  Negative for cough, shortness of breath and wheezing.    Cardiovascular:  Negative for chest pain and palpitations.   Gastrointestinal:  Negative for constipation, diarrhea, nausea and vomiting.   Genitourinary:  Negative for dysuria, frequency and urgency.   Musculoskeletal:  Negative for myalgias.   Skin:  Negative for rash.   Neurological:  Negative for headaches.  "      Medication List with Changes/Refills   Current Medications    AMOXICILLIN-CLAVULANATE 875-125MG (AUGMENTIN) 875-125 MG PER TABLET    Take 1 tablet by mouth 2 (two) times daily.    AZELASTINE (ASTELIN) 137 MCG (0.1 %) NASAL SPRAY    1 spray (137 mcg total) by Nasal route 2 (two) times daily.    FOLIC ACID (FOLVITE) 1 MG TABLET    Take 1 mg by mouth once daily.    IPRATROPIUM (ATROVENT) 21 MCG (0.03 %) NASAL SPRAY    2 sprays by Each Nostril route 2 (two) times daily.    LEVOCETIRIZINE (XYZAL) 5 MG TABLET    Take 1 tablet (5 mg total) by mouth once daily.    LIDOCAINE (LIDODERM) 5 %    Place 1 patch onto the skin once daily. Remove & Discard patch within 12 hours or as directed by MD    MOXIFLOXACIN (VIGAMOX) 0.5 % OPHTHALMIC SOLUTION    Instill one drop into the right eye every two hours while awake    TRIAMCINOLONE ACETONIDE 0.1% (KENALOG) 0.1 % CREAM    Apply topically 2 (two) times daily. for 10 days    VALACYCLOVIR (VALTREX) 1000 MG TABLET    TAKE 1 TABLET (1,000 MG TOTAL) BY MOUTH EVERY 12 (TWELVE) HOURS. 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    Weight as of this encounter: 78.7 kg (173 lb 8 oz).  Physical Exam  Vitals reviewed.   Constitutional:       General: She is not in acute distress.     Appearance: She is not diaphoretic.   HENT:      Head: Normocephalic " and atraumatic.      Right Ear: Tympanic membrane, ear canal and external ear normal. There is no impacted cerumen.      Left Ear: Tympanic membrane, ear canal and external ear normal. There is no impacted cerumen.   Eyes:      Conjunctiva/sclera: Conjunctivae normal.   Cardiovascular:      Rate and Rhythm: Normal rate and regular rhythm.      Heart sounds: Normal heart sounds.   Pulmonary:      Effort: Pulmonary effort is normal. No respiratory distress.      Breath sounds: Normal breath sounds. No wheezing.   Abdominal:      General: Bowel sounds are normal.      Palpations: Abdomen is soft.   Musculoskeletal:         General: Normal range of motion.      Cervical back: Normal range of motion.   Skin:     General: Skin is warm and dry.      Findings: No erythema or rash.          Neurological:      Mental Status: She is alert and oriented to person, place, and time.   Psychiatric:         Mood and Affect: Mood and affect normal.         Behavior: Behavior normal.         Thought Content: Thought content normal.         Judgment: Judgment normal.           Assessment and Plan:     1. Left ear pain  - patient with long history of allergic rhinitis with persistent left ear pain, discussed that her exam is normal and suspect that she may have Eustachian tube dysfunction leading to middle ear effusion, patient has been seen by ENT in the past who was also discussed similar diagnosis    2. Yeast vaginitis  - due to recent antibiotics, will give fluconazole  - fluconazole (DIFLUCAN) 150 MG Tab; Take 1 tablet (150 mg total) by mouth once. for 1 dose  Dispense: 1 tablet; Refill: 0    3. Bacterial vaginosis  - discussed bacterial vaginosis and the use of    4. Encounter for administration of vaccine / post splenectomy  - per hematology note:  patient with history of splenectomy, had pneumococcal 23 Valent in October and pneumococcal 13 in 2017.  Unclear at this time whether repeated pneumococcal vaccines are a value, but  reasonable to repeat every 5-10 years.  Also had meningococcal booster in 2017 and she beginning booster every 5 years    5. Annual physical exam  - reviewed all recent labs    6. 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 Livedo reticularis  - monitor for now as patient does not have any other symptoms, likely related to sickle cells-hemoglobin C disease      Follow Up:   No follow-ups on file.    Other Orders Placed This Visit:  No orders of the defined types were placed in this encounter.        Jeffery Chawla MD        This note is dictated on M*Modal word recognition program.  There are word recognition mistakes that are occasionally missed on review.

## 2024-03-11 NOTE — PATIENT INSTRUCTIONS
Supplements for Migraine:  1. Magnesium Oxide - 400mg by mouth daily  2. Riboflavin (Vitamin B2) - 400mg by mouth daily  3. Coenzyme Q10 - 200mg tablet by mouth daily     - Please download Migraine Zac brandon on phone and begin tracking your headaches \    BV - boric acid

## 2024-03-26 ENCOUNTER — PATIENT MESSAGE (OUTPATIENT)
Dept: PRIMARY CARE CLINIC | Facility: CLINIC | Age: 39
End: 2024-03-26
Payer: COMMERCIAL

## 2024-03-26 ENCOUNTER — OFFICE VISIT (OUTPATIENT)
Dept: URGENT CARE | Facility: CLINIC | Age: 39
End: 2024-03-26
Payer: COMMERCIAL

## 2024-03-26 VITALS
OXYGEN SATURATION: 98 % | HEIGHT: 69 IN | RESPIRATION RATE: 16 BRPM | BODY MASS INDEX: 25.62 KG/M2 | SYSTOLIC BLOOD PRESSURE: 117 MMHG | HEART RATE: 112 BPM | DIASTOLIC BLOOD PRESSURE: 78 MMHG | WEIGHT: 173 LBS | TEMPERATURE: 99 F

## 2024-03-26 DIAGNOSIS — R05.9 COUGH, UNSPECIFIED TYPE: Primary | ICD-10-CM

## 2024-03-26 DIAGNOSIS — J10.1 INFLUENZA A: ICD-10-CM

## 2024-03-26 LAB
CTP QC/QA: YES
POC MOLECULAR INFLUENZA A AGN: POSITIVE
POC MOLECULAR INFLUENZA B AGN: NEGATIVE

## 2024-03-26 PROCEDURE — 99213 OFFICE O/P EST LOW 20 MIN: CPT | Mod: S$GLB,,,

## 2024-03-26 PROCEDURE — 87502 INFLUENZA DNA AMP PROBE: CPT | Mod: QW,S$GLB,,

## 2024-03-26 RX ORDER — OSELTAMIVIR PHOSPHATE 75 MG/1
75 CAPSULE ORAL 2 TIMES DAILY
Qty: 10 CAPSULE | Refills: 0 | Status: SHIPPED | OUTPATIENT
Start: 2024-03-26 | End: 2024-03-31

## 2024-03-26 NOTE — PATIENT INSTRUCTIONS
- Rest.    - Drink plenty of fluids. Increasing your fluid intake will help loosen up mucous.  - Viral upper respiratory infections typically run their course in 10-14 days.      - You can take over-the-counter claritin, zyrtec, allegra, OR xyzal as directed. These are antihistamines that can help with runny nose, nasal congestion, sneezing, and helps to dry up post-nasal drip, which usually causes sore throat and cough.    - You can take Delsym to help with cough.     - If you do NOT have high blood pressure, you may use a decongestant form (D)  of this medication (ie. Claritin- D, zyrtec-D, allegra-D) or if you do not take the D form, you can take sudafed (pseudoephedrine) over the counter, which is a decongestant. Do NOT take two decongestant (D) medications at the same time (such as mucinex-D and claritin-D or plain sudafed and claritin D). Dextromethorphan (DM) is a cough suppressant over the counter (ie. mucinex DM, robitussin, delsym; dayquil/nyquil has DM as well.)     - You can use Flonase (fluticasone) nasal spray as directed for sinus congestion and postnasal drip. This is a steroid nasal spray that works locally over time to decrease the inflammation in your nose/sinuses and help with allergic symptoms. This is not an quick- relief spray like afrin, but it works well if used daily.  Discontinue if you develop nose bleed  - Use nasal saline prior to Flonase.  - Use Ocean Spray Nasal Saline 1-3 puffs each nostril every 2-3 hours then blow out onto tissue. This is to irrigate the nasal passage way to clear the sinus openings. Use until sinus problem resolved.    - A Neti Pot with sterile saline can help break up nasal congestion and give relief.      - Chloraseptic throat spray can help numb the throat.     - Warm salt water gargles can help with sore throat.  - Warm tea with honey can help with sore throat and cough. Honey is a natural cough suppressant.    - Rest.    - Drink plenty of fluids.    -  Acetaminophen (tylenol) or Ibuprofen (advil,motrin) as directed as needed for fever/pain. Avoid tylenol if you have a history of liver disease. Do not take ibuprofen if you have a history of GI bleeding, kidney disease, or if you take blood thinners.   - Ibuprofen dosing for adults: 400 mg by mouth every 4-6 hours as needed. Max: 2400 mg/day; Info: use lowest effective dose, shortest effective treatment duration; give w/ food if GI upset occurs.  - Tylenol dosing for adults: [By mouth route, immediate-release form] Dose: 325-1000 mg by mouth every 4-6h as needed; Max: 1 g/4h and 4 g/day from all sources. [By mouth route, extended-release form] Dose: 650-1300 mg Extended Release by mouth every 8h as needed; Max: 4 g/day from all sources.     - You must understand that you have received an Urgent Care treatment only and that you may be released before all of your medical problems are known or treated.   - You, the patient, will arrange for follow up care as instructed.   - If your condition worsens or fails to improve we recommend that you receive another evaluation at the ER immediately or contact your PCP to discuss your concerns or return here.   - Follow up with your PCP or specialty clinic as directed in the next 1-2 weeks if not improved or as needed.  You can call [phone] to schedule an appointment with the appropriate provider.    If your symptoms do not improve or worsen, go to the emergency room immediately.

## 2024-03-26 NOTE — PROGRESS NOTES
"Subjective:      Patient ID: Gautam Ramirez is a 38 y.o. female.    Vitals:  height is 5' 9" (1.753 m) and weight is 78.5 kg (173 lb). Her oral temperature is 99 °F (37.2 °C). Her blood pressure is 117/78 and her pulse is 112 (abnormal). Her respiration is 16 and oxygen saturation is 98%.     Chief Complaint: Cough    38 y.o female c/o body aches, fever and ear pain started yesterday. Patient reports that she had pneumonia a week ago. Patient reports that she been taking OTC cough medication     Cough  This is a new problem. The current episode started in the past 7 days. The problem has been unchanged. The problem occurs constantly. The cough is Productive of sputum. Associated symptoms include ear pain, headaches, nasal congestion and postnasal drip. Pertinent negatives include no chest pain, chills, ear congestion, fever, sore throat, shortness of breath, sweats, weight loss or wheezing. Nothing aggravates the symptoms. She has tried OTC cough suppressant for the symptoms. The treatment provided no relief. Her past medical history is significant for pneumonia. There is no history of emphysema or environmental allergies.       Constitution: Negative for chills and fever.   HENT:  Positive for ear pain and postnasal drip. Negative for sore throat.    Cardiovascular:  Negative for chest pain.   Respiratory:  Positive for cough. Negative for shortness of breath and wheezing.    Allergic/Immunologic: Negative for environmental allergies.   Neurological:  Positive for headaches.      Objective:     Physical Exam   Constitutional: She is oriented to person, place, and time. She appears well-developed. She is cooperative.  Non-toxic appearance. She does not appear ill. No distress.      Comments:Patient sits comfortably in exam chair. Answers questions in complete sentences. Does not show any signs of distress or discoloration.        HENT:   Head: Normocephalic and atraumatic.   Ears:   Right Ear: Hearing, " tympanic membrane, external ear and ear canal normal. impacted cerumen  Left Ear: Hearing, tympanic membrane, external ear and ear canal normal. impacted cerumen  Nose: Rhinorrhea and congestion present. No mucosal edema or nasal deformity. No epistaxis. Right sinus exhibits no maxillary sinus tenderness and no frontal sinus tenderness. Left sinus exhibits no maxillary sinus tenderness and no frontal sinus tenderness.   Mouth/Throat: Uvula is midline and mucous membranes are normal. No trismus in the jaw. Normal dentition. No uvula swelling. Posterior oropharyngeal erythema present. No oropharyngeal exudate or posterior oropharyngeal edema. No tonsillar exudate.   Eyes: Conjunctivae and lids are normal. No scleral icterus.   Neck: Trachea normal and phonation normal. Neck supple. No edema present. No erythema present. No neck rigidity present.   Cardiovascular: Normal rate, regular rhythm, normal heart sounds and normal pulses.   Pulmonary/Chest: Effort normal and breath sounds normal. No stridor. No respiratory distress. She has no decreased breath sounds. She has no wheezes. She has no rhonchi. She has no rales.   Abdominal: Normal appearance.   Musculoskeletal: Normal range of motion.         General: No deformity. Normal range of motion.   Lymphadenopathy:     She has no cervical adenopathy.        Right cervical: No superficial cervical, no deep cervical and no posterior cervical adenopathy present.       Left cervical: No superficial cervical, no deep cervical and no posterior cervical adenopathy present.   Neurological: She is alert and oriented to person, place, and time. She exhibits normal muscle tone. Coordination normal.   Skin: Skin is warm, dry, intact, not diaphoretic and not pale.   Psychiatric: Her speech is normal and behavior is normal. Judgment and thought content normal.   Nursing note and vitals reviewed.    Results for orders placed or performed in visit on 03/26/24   POCT Influenza A/B  MOLECULAR   Result Value Ref Range    POC Molecular Influenza A Ag Positive (A) Negative, Not Reported    POC Molecular Influenza B Ag Negative Negative, Not Reported     Acceptable Yes        Assessment:     1. Cough, unspecified type    2. Influenza A        Plan:       Cough, unspecified type  -     POCT Influenza A/B MOLECULAR    Influenza A  -     oseltamivir (TAMIFLU) 75 MG capsule; Take 1 capsule (75 mg total) by mouth 2 (two) times daily. for 5 days  Dispense: 10 capsule; Refill: 0                Patient Instructions   - Rest.    - Drink plenty of fluids. Increasing your fluid intake will help loosen up mucous.  - Viral upper respiratory infections typically run their course in 10-14 days.      - You can take over-the-counter claritin, zyrtec, allegra, OR xyzal as directed. These are antihistamines that can help with runny nose, nasal congestion, sneezing, and helps to dry up post-nasal drip, which usually causes sore throat and cough.    - You can take Delsym to help with cough.     - If you do NOT have high blood pressure, you may use a decongestant form (D)  of this medication (ie. Claritin- D, zyrtec-D, allegra-D) or if you do not take the D form, you can take sudafed (pseudoephedrine) over the counter, which is a decongestant. Do NOT take two decongestant (D) medications at the same time (such as mucinex-D and claritin-D or plain sudafed and claritin D). Dextromethorphan (DM) is a cough suppressant over the counter (ie. mucinex DM, robitussin, delsym; dayquil/nyquil has DM as well.)     - You can use Flonase (fluticasone) nasal spray as directed for sinus congestion and postnasal drip. This is a steroid nasal spray that works locally over time to decrease the inflammation in your nose/sinuses and help with allergic symptoms. This is not an quick- relief spray like afrin, but it works well if used daily.  Discontinue if you develop nose bleed  - Use nasal saline prior to Flonase.  - Use  Ocean Spray Nasal Saline 1-3 puffs each nostril every 2-3 hours then blow out onto tissue. This is to irrigate the nasal passage way to clear the sinus openings. Use until sinus problem resolved.    - A Neti Pot with sterile saline can help break up nasal congestion and give relief.      - Chloraseptic throat spray can help numb the throat.     - Warm salt water gargles can help with sore throat.  - Warm tea with honey can help with sore throat and cough. Honey is a natural cough suppressant.    - Rest.    - Drink plenty of fluids.    - Acetaminophen (tylenol) or Ibuprofen (advil,motrin) as directed as needed for fever/pain. Avoid tylenol if you have a history of liver disease. Do not take ibuprofen if you have a history of GI bleeding, kidney disease, or if you take blood thinners.   - Ibuprofen dosing for adults: 400 mg by mouth every 4-6 hours as needed. Max: 2400 mg/day; Info: use lowest effective dose, shortest effective treatment duration; give w/ food if GI upset occurs.  - Tylenol dosing for adults: [By mouth route, immediate-release form] Dose: 325-1000 mg by mouth every 4-6h as needed; Max: 1 g/4h and 4 g/day from all sources. [By mouth route, extended-release form] Dose: 650-1300 mg Extended Release by mouth every 8h as needed; Max: 4 g/day from all sources.     - You must understand that you have received an Urgent Care treatment only and that you may be released before all of your medical problems are known or treated.   - You, the patient, will arrange for follow up care as instructed.   - If your condition worsens or fails to improve we recommend that you receive another evaluation at the ER immediately or contact your PCP to discuss your concerns or return here.   - Follow up with your PCP or specialty clinic as directed in the next 1-2 weeks if not improved or as needed.  You can call [phone] to schedule an appointment with the appropriate provider.    If your symptoms do not improve or  worsen, go to the emergency room immediately.    '

## 2024-03-26 NOTE — LETTER
March 26, 2024      [facility] [address] [place] [facility] [address]  Phone: [phone]  Fax: [phone]       Patient: Gautam Ramirez   YOB: 1985  Date of Visit: 03/26/2024    To Whom It May Concern:    Janet Ramirez  was at [facility] on 03/26/2024. The patient may return to work/school on 03/29/24 with no restrictions. If you have any questions or concerns, or if I can be of further assistance, please do not hesitate to contact me.    Sincerely,    Hanna Waller PA-C

## 2024-04-04 ENCOUNTER — PATIENT MESSAGE (OUTPATIENT)
Dept: PRIMARY CARE CLINIC | Facility: CLINIC | Age: 39
End: 2024-04-04
Payer: COMMERCIAL

## 2024-04-09 ENCOUNTER — OFFICE VISIT (OUTPATIENT)
Dept: PRIMARY CARE CLINIC | Facility: CLINIC | Age: 39
End: 2024-04-09
Payer: COMMERCIAL

## 2024-04-09 DIAGNOSIS — R05.8 POST-VIRAL COUGH SYNDROME: Primary | ICD-10-CM

## 2024-04-09 PROCEDURE — 99213 OFFICE O/P EST LOW 20 MIN: CPT | Mod: 95,,, | Performed by: FAMILY MEDICINE

## 2024-04-09 RX ORDER — PROMETHAZINE HYDROCHLORIDE AND DEXTROMETHORPHAN HYDROBROMIDE 6.25; 15 MG/5ML; MG/5ML
5 SYRUP ORAL EVERY 4 HOURS PRN
Qty: 118 ML | Refills: 0 | Status: SHIPPED | OUTPATIENT
Start: 2024-04-09

## 2024-04-09 NOTE — PROGRESS NOTES
The patient location is: [place]  The chief complaint leading to consultation is: cough with plegm    Visit type: audiovisual    Face to Face time with patient: 15 minutes of total time spent on the encounter, which includes face to face time and non-face to face time preparing to see the patient (eg, review of tests), Obtaining and/or reviewing separately obtained history, Documenting clinical information in the electronic or other health record, Independently interpreting results (not separately reported) and communicating results to the patient/family/caregiver, or Care coordination (not separately reported).         Each patient to whom he or she provides medical services by telemedicine is:  (1) informed of the relationship between the physician and patient and the respective role of any other health care provider with respect to management of the patient; and (2) notified that he or she may decline to receive medical services by telemedicine and may withdraw from such care at any time.    Notes:       Clinic Note  4/9/2024      Subjective:       Patient ID:  Gautam is a 38 y.o. female being seen for an established visit.    Chief Complaint:  Cough with phlegm     Cough with phlegm-about 2 weeks ago patient tested positive for flu and took Tamiflu at the time.  At the time patient had body aches, severe fatigue, fever, chills.  Those symptoms have since resolved, week later started developing cough with phlegm/mucus.  Also has some throat soreness and chest soreness from the coughing.  Has tried to take Robitussin along with honey, cough drops, allergy medications which helps minimally.  Has side effects with Mucinex.  Denies any current fever, chills, chest pain, dyspnea, wheezing.  History of community-acquired pneumonia 1 month ago treated with antibiotics.        Review of Systems   Constitutional:  Negative for chills, fever, malaise/fatigue and weight loss.   HENT:  Negative for congestion, sinus pain and  sore throat.    Respiratory:  Positive for cough and sputum production. Negative for hemoptysis, shortness of breath and wheezing.    Cardiovascular:  Negative for chest pain and palpitations.   Gastrointestinal:  Negative for constipation, diarrhea, nausea and vomiting.   Genitourinary:  Negative for dysuria, frequency and urgency.   Musculoskeletal:  Negative for myalgias.   Skin:  Negative for rash.   Neurological:  Negative for headaches.       Medication List with Changes/Refills   Current Medications    AMOXICILLIN-CLAVULANATE 875-125MG (AUGMENTIN) 875-125 MG PER TABLET    Take 1 tablet by mouth 2 (two) times daily.    AZELASTINE (ASTELIN) 137 MCG (0.1 %) NASAL SPRAY    1 spray (137 mcg total) by Nasal route 2 (two) times daily.    FOLIC ACID (FOLVITE) 1 MG TABLET    Take 1 mg by mouth once daily.    IPRATROPIUM (ATROVENT) 21 MCG (0.03 %) NASAL SPRAY    2 sprays by Each Nostril route 2 (two) times daily.    LEVOCETIRIZINE (XYZAL) 5 MG TABLET    Take 1 tablet (5 mg total) by mouth once daily.    LIDOCAINE (LIDODERM) 5 %    Place 1 patch onto the skin once daily. Remove & Discard patch within 12 hours or as directed by MD    MOXIFLOXACIN (VIGAMOX) 0.5 % OPHTHALMIC SOLUTION    Instill one drop into the right eye every two hours while awake    TRIAMCINOLONE ACETONIDE 0.1% (KENALOG) 0.1 % CREAM    Apply topically 2 (two) times daily. for 10 days    VALACYCLOVIR (VALTREX) 1000 MG TABLET    TAKE 1 TABLET (1,000 MG TOTAL) BY MOUTH EVERY 12 (TWELVE) HOURS. 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 03/01/2024   Estimated body mass index is 25.55 kg/m² as calculated from the following:    Height  "as of 3/26/24: 5' 9" (1.753 m).    Weight as of 3/26/24: 78.5 kg (173 lb).  Physical Exam  Constitutional:       General: She is not in acute distress.     Appearance: Normal appearance. She is not ill-appearing, toxic-appearing or diaphoretic.   HENT:      Head: Normocephalic and atraumatic.   Eyes:      Conjunctiva/sclera: Conjunctivae normal.   Pulmonary:      Effort: Pulmonary effort is normal.      Comments: Speaking in complete sentences  Musculoskeletal:         General: Normal range of motion.   Neurological:      Mental Status: She is alert and oriented to person, place, and time.   Psychiatric:         Mood and Affect: Mood and affect normal.         Behavior: Behavior normal.         Thought Content: Thought content normal.         Cognition and Memory: Memory normal.         Judgment: Judgment normal.           Assessment and Plan:     1. Post-viral cough syndrome  - symptoms consistent with postviral cough syndrome, recommend continue taking the Robitussin to help break up the mucus, will also add on promethazine-DM.  Discussed with patient that if in 1 week the cough has still not improved does send a message and can switch to a different cough syrup/re-evaluate  - promethazine-dextromethorphan (PROMETHAZINE-DM) 6.25-15 mg/5 mL Syrp; Take 5 mLs by mouth every 4 (four) hours as needed (cough).  Dispense: 118 mL; Refill: 0        Follow Up:   No follow-ups on file.    Other Orders Placed This Visit:  No orders of the defined types were placed in this encounter.        Jeffery Chawla MD        This note is dictated on M*Modal word recognition program.  There are word recognition mistakes that are occasionally missed on review.  "

## 2024-05-29 ENCOUNTER — TELEPHONE (OUTPATIENT)
Dept: OPTOMETRY | Facility: CLINIC | Age: 39
End: 2024-05-29
Payer: COMMERCIAL

## 2024-05-29 NOTE — TELEPHONE ENCOUNTER
----- Message from Jarrod Moser sent at 5/29/2024  4:02 PM CDT -----  Consult/Advisory    Name Of Caller:Rosalee       Contact Preference:[phone]    Nature of call: Ptn see Dr Iyer he has nothing avail she is wanting to be seen this week for cl fit if possible if so can you gibe her a call

## 2024-07-05 DIAGNOSIS — J30.1 SEASONAL ALLERGIC RHINITIS DUE TO POLLEN: ICD-10-CM

## 2024-07-05 DIAGNOSIS — H66.002 ACUTE SUPPURATIVE OTITIS MEDIA OF LEFT EAR WITHOUT SPONTANEOUS RUPTURE OF TYMPANIC MEMBRANE, RECURRENCE NOT SPECIFIED: ICD-10-CM

## 2024-07-05 DIAGNOSIS — J30.9 ALLERGIC RHINITIS, UNSPECIFIED SEASONALITY, UNSPECIFIED TRIGGER: ICD-10-CM

## 2024-07-05 NOTE — TELEPHONE ENCOUNTER
No care due was identified.  [facility] Embedded Care Due Messages. Reference number: [id number].   7/05/2024 4:00:36 PM CDT

## 2024-07-05 NOTE — TELEPHONE ENCOUNTER
No care due was identified.  [facility] Embedded Care Due Messages. Reference number: [id number].   7/05/2024 3:48:25 PM CDT

## 2024-07-06 RX ORDER — IPRATROPIUM BROMIDE 21 UG/1
SPRAY, METERED NASAL
Qty: 90 ML | Refills: 2 | Status: SHIPPED | OUTPATIENT
Start: 2024-07-06

## 2024-07-06 RX ORDER — LEVOCETIRIZINE DIHYDROCHLORIDE 5 MG/1
5 TABLET, FILM COATED ORAL
Qty: 90 TABLET | Refills: 2 | OUTPATIENT
Start: 2024-07-06

## 2024-07-07 NOTE — TELEPHONE ENCOUNTER
Refill Decision Note   Gautam Ramirez  is requesting a refill authorization.  Brief Assessment and Rationale for Refill:  Quick Discontinue  Approve     Medication Therapy Plan:         Comments:     Note composed:8:43 PM 07/06/2024

## 2024-07-16 RX ORDER — AZELASTINE 1 MG/ML
1 SPRAY, METERED NASAL 2 TIMES DAILY
Qty: 30 ML | Refills: 1 | Status: SHIPPED | OUTPATIENT
Start: 2024-07-16

## 2024-07-16 RX ORDER — LEVOCETIRIZINE DIHYDROCHLORIDE 5 MG/1
5 TABLET, FILM COATED ORAL DAILY
Qty: 90 TABLET | Refills: 3 | Status: SHIPPED | OUTPATIENT
Start: 2024-07-16

## 2024-07-24 DIAGNOSIS — M25.569 KNEE PAIN, UNSPECIFIED CHRONICITY, UNSPECIFIED LATERALITY: ICD-10-CM

## 2024-07-24 RX ORDER — IBUPROFEN 600 MG/1
600 TABLET ORAL EVERY 6 HOURS
COMMUNITY
Start: 2024-07-05

## 2024-07-24 RX ORDER — IBUPROFEN 600 MG/1
600 TABLET ORAL EVERY 6 HOURS
Qty: 90 TABLET | Refills: 1 | Status: SHIPPED | OUTPATIENT
Start: 2024-07-24

## 2024-07-24 NOTE — TELEPHONE ENCOUNTER
No care due was identified.  [facility] Embedded Care Due Messages. Reference number: [id number].   7/24/2024 12:08:30 AM CDT

## 2024-11-14 ENCOUNTER — OFFICE VISIT (OUTPATIENT)
Dept: OBSTETRICS AND GYNECOLOGY | Facility: CLINIC | Age: 39
End: 2024-11-14
Attending: OBSTETRICS & GYNECOLOGY
Payer: COMMERCIAL

## 2024-11-14 ENCOUNTER — LAB VISIT (OUTPATIENT)
Dept: LAB | Facility: OTHER | Age: 39
End: 2024-11-14
Attending: OBSTETRICS & GYNECOLOGY
Payer: COMMERCIAL

## 2024-11-14 VITALS
WEIGHT: 170.19 LBS | HEIGHT: 69 IN | SYSTOLIC BLOOD PRESSURE: 120 MMHG | BODY MASS INDEX: 25.21 KG/M2 | DIASTOLIC BLOOD PRESSURE: 60 MMHG

## 2024-11-14 DIAGNOSIS — N89.8 VAGINAL DISCHARGE: ICD-10-CM

## 2024-11-14 DIAGNOSIS — Z11.3 SCREENING FOR VENEREAL DISEASE: ICD-10-CM

## 2024-11-14 DIAGNOSIS — Z01.419 WELL FEMALE EXAM WITH ROUTINE GYNECOLOGICAL EXAM: Primary | ICD-10-CM

## 2024-11-14 LAB
HBV SURFACE AG SERPL QL IA: NORMAL
HIV 1+2 AB+HIV1 P24 AG SERPL QL IA: NEGATIVE
TREPONEMA PALLIDUM IGG+IGM AB [PRESENCE] IN SERUM OR PLASMA BY IMMUNOASSAY: NONREACTIVE

## 2024-11-14 PROCEDURE — 1159F MED LIST DOCD IN RCRD: CPT | Mod: CPTII,S$GLB,, | Performed by: OBSTETRICS & GYNECOLOGY

## 2024-11-14 PROCEDURE — 3074F SYST BP LT 130 MM HG: CPT | Mod: CPTII,S$GLB,, | Performed by: OBSTETRICS & GYNECOLOGY

## 2024-11-14 PROCEDURE — 87591 N.GONORRHOEAE DNA AMP PROB: CPT | Performed by: OBSTETRICS & GYNECOLOGY

## 2024-11-14 PROCEDURE — 36415 COLL VENOUS BLD VENIPUNCTURE: CPT | Performed by: OBSTETRICS & GYNECOLOGY

## 2024-11-14 PROCEDURE — 86593 SYPHILIS TEST NON-TREP QUANT: CPT | Performed by: OBSTETRICS & GYNECOLOGY

## 2024-11-14 PROCEDURE — 3078F DIAST BP <80 MM HG: CPT | Mod: CPTII,S$GLB,, | Performed by: OBSTETRICS & GYNECOLOGY

## 2024-11-14 PROCEDURE — 99395 PREV VISIT EST AGE 18-39: CPT | Mod: S$GLB,,, | Performed by: OBSTETRICS & GYNECOLOGY

## 2024-11-14 PROCEDURE — 87340 HEPATITIS B SURFACE AG IA: CPT | Performed by: OBSTETRICS & GYNECOLOGY

## 2024-11-14 PROCEDURE — 87389 HIV-1 AG W/HIV-1&-2 AB AG IA: CPT | Performed by: OBSTETRICS & GYNECOLOGY

## 2024-11-14 PROCEDURE — 3008F BODY MASS INDEX DOCD: CPT | Mod: CPTII,S$GLB,, | Performed by: OBSTETRICS & GYNECOLOGY

## 2024-11-14 PROCEDURE — 0352U VAGINOSIS SCREEN BY DNA PROBE: CPT | Performed by: OBSTETRICS & GYNECOLOGY

## 2024-11-14 PROCEDURE — 1160F RVW MEDS BY RX/DR IN RCRD: CPT | Mod: CPTII,S$GLB,, | Performed by: OBSTETRICS & GYNECOLOGY

## 2024-11-14 PROCEDURE — 99999 PR PBB SHADOW E&M-EST. PATIENT-LVL III: CPT | Mod: PBBFAC,,, | Performed by: OBSTETRICS & GYNECOLOGY

## 2024-11-14 NOTE — PROGRESS NOTES
SUBJECTIVE:   39 y.o. female   for routine gyn exam. Patient's last menstrual period was 10/26/2024 (approximate)..  She has vaginal d/c. H/o BV. Desires STD screen. Has vulvar bump for a few months. No itching or burning         Past Medical History:   Diagnosis Date    Abnormal Pap smear of cervix ,     colpo    Allergic rhinitis     Anemia     Gallstone     Genital herpes 2012    Outbreaks 1-2 per year, last outbreak 2017    Hemoglobin C disease     Sickle cell disease, type SC     Last crisis 3/2017     Past Surgical History:   Procedure Laterality Date    APPENDECTOMY      FOOT SURGERY Left     SPLENECTOMY, TOTAL      WISDOM TOOTH EXTRACTION       Social History     Socioeconomic History    Marital status:    Tobacco Use    Smoking status: Every Day     Current packs/day: 0.25     Average packs/day: 0.3 packs/day for 10.0 years (2.5 ttl pk-yrs)     Types: Cigarettes     Passive exposure: Current    Smokeless tobacco: Never    Tobacco comments:     black and milds   Substance and Sexual Activity    Alcohol use: Yes     Alcohol/week: 1.0 standard drink of alcohol     Types: 1 Cans of beer per week     Comment: socially    Drug use: No    Sexual activity: Yes     Partners: Male     Birth control/protection: None   Social History Narrative    N/A per the patient      Family History   Problem Relation Name Age of Onset    Hypertension Father      Cancer Father          Lung    Cancer Paternal Grandfather          Lung    Ovarian cancer Other      Breast cancer Other      Diabetes Other      Esophageal cancer Neg Hx      Colon cancer Neg Hx       OB History    Para Term  AB Living   1 1 0 1 0 1   SAB IAB Ectopic Multiple Live Births   0 0 0 0 1      # Outcome Date GA Lbr Bro/2nd Weight Sex Type Anes PTL Lv   1  18 35w0d  2.326 kg (5 lb 2.1 oz) M Vag-Spont EPI Y NAZARIO         Current Outpatient Medications   Medication Sig Dispense Refill    amoxicillin-clavulanate  875-125mg (AUGMENTIN) 875-125 mg per tablet Take 1 tablet by mouth 2 (two) times daily. 14 tablet 0    azelastine (ASTELIN) 137 mcg (0.1 %) nasal spray 1 spray (137 mcg total) by Nasal route 2 (two) times daily. 30 mL 1    folic acid (FOLVITE) 1 MG tablet Take 1 mg by mouth once daily.      ibuprofen (ADVIL,MOTRIN) 600 MG tablet TAKE 1 TABLET BY MOUTH EVERY 6 HOURS 90 tablet 1    ibuprofen (ADVIL,MOTRIN) 600 MG tablet Take 600 mg by mouth every 6 (six) hours.      ipratropium (ATROVENT) 21 mcg (0.03 %) nasal spray SPRAY 2 SPRAYS BY EACH NOSTRIL ROUTE 2 TIMES DAILY. 90 mL 2    levocetirizine (XYZAL) 5 MG tablet Take 1 tablet (5 mg total) by mouth once daily. 90 tablet 3    LIDOcaine (LIDODERM) 5 % Place 1 patch onto the skin once daily. Remove & Discard patch within 12 hours or as directed by MD 5 patch 0    moxifloxacin (VIGAMOX) 0.5 % ophthalmic solution Instill one drop into the right eye every two hours while awake 3 mL 2    promethazine-dextromethorphan (PROMETHAZINE-DM) 6.25-15 mg/5 mL Syrp Take 5 mLs by mouth every 4 (four) hours as needed (cough). 118 mL 0    valACYclovir (VALTREX) 1000 MG tablet TAKE 1 TABLET (1,000 MG TOTAL) BY MOUTH EVERY 12 (TWELVE) HOURS. FOR 3 DAYS AS NEEDED 30 tablet 8    triamcinolone acetonide 0.1% (KENALOG) 0.1 % cream Apply topically 2 (two) times daily. for 10 days 454 g 2     No current facility-administered medications for this visit.     Allergies: Prednisone, Doxycycline, and Nickel     ROS:  Constitutional: no weight loss, weight gain, fever, fatigue  Eyes:  No vision changes, glasses/contacts  ENT/Mouth: No ulcers, sinus problems, ears ringing, headache  Cardiovascular: No inability to lie flat, chest pain, exercise intolerance, swelling, heart palpitations  Respiratory: No wheezing, coughing blood, shortness of breath, or cough  Gastrointestinal: No diarrhea, bloody stool, nausea/vomiting, constipation, gas, hemorrhoids  Genitourinary: No blood in urine, painful urination,  "urgency of urination, frequency of urination, incomplete emptying, incontinence, abnormal bleeding, painful periods, heavy periods, +vaginal discharge, vaginal odor, painful intercourse, sexual problems, bleeding after intercourse.  Musculoskeletal: No muscle weakness  Skin/Breast: No painful breasts, nipple discharge, masses, rash, ulcers  Neurological: No passing out, seizures, numbness, headache  Endocrine: No diabetes, hypothyroid, hyperthyroid, hot flashes, hair loss, abnormal hair growth, acne  Psychiatric: No depression, crying  Hematologic: No bruises, bleeding, swollen lymph nodes, anemia.      OBJECTIVE:   The patient appears well, alert, oriented x 3, in no distress.  /60 (BP Location: Left arm, Patient Position: Sitting)   Ht 5' 9" (1.753 m)   Wt 77.2 kg (170 lb 3.1 oz)   LMP 10/26/2024 (Approximate)   BMI 25.13 kg/m²   NECK: no thyromegaly, trachea midline  SKIN: no acne, striae, hirsutism  CHEST: CTAB  CV: RRR  BREAST EXAM: breasts appear normal, no suspicious masses, no skin or nipple changes or axillary nodes  ABDOMEN: no hernias, masses, or hepatosplenomegaly  GENITALIA: normal external genitalia, no erythema, no discharge  1-2 mm folliculitis left of midline above clitoris  URETHRA: normal urethra, normal urethral meatus  VAGINA: Normal  CERVIX: no lesions or cervical motion tenderness  UTERUS: normal size, contour, position, consistency, mobility, non-tender  ADNEXA: no mass, fullness, tenderness    Physical Exam    Genitourinary:            ASSESSMENT:   1. Well female exam with routine gynecological exam        2. Vaginal discharge  Vaginosis Screen by DNA Probe    C. trachomatis/N. gonorrhoeae by AMP DNA      3. Screening for venereal disease  Vaginosis Screen by DNA Probe    C. trachomatis/N. gonorrhoeae by AMP DNA    HIV 1/2 Ag/Ab (4th Gen)    Treponema Pallidium Antibodies IgG, IgM    Hepatitis B Surface Antigen          PLAN:   Orders Placed This Encounter    Vaginosis Screen by " DNA Probe    C. trachomatis/N. gonorrhoeae by AMP DNA    HIV 1/2 Ag/Ab (4th Gen)    Treponema Pallidium Antibodies IgG, IgM    Hepatitis B Surface Antigen     Discussed healthy lifestyle including regular exercise, healthy eating, etc.  Return to clinic in 1 year

## 2024-11-15 LAB
BACTERIAL VAGINOSIS DNA: DETECTED
C TRACH DNA SPEC QL NAA+PROBE: NOT DETECTED
CANDIDA GLABRATA/KRUSEI: NOT DETECTED
CANDIDA RRNA VAG QL PROBE: NOT DETECTED
N GONORRHOEA DNA SPEC QL NAA+PROBE: NOT DETECTED
TRICHOMONAS VAGINALIS: NOT DETECTED

## 2025-02-22 ENCOUNTER — OFFICE VISIT (OUTPATIENT)
Dept: URGENT CARE | Facility: CLINIC | Age: 40
End: 2025-02-22
Payer: COMMERCIAL

## 2025-02-22 VITALS
RESPIRATION RATE: 18 BRPM | WEIGHT: 170 LBS | BODY MASS INDEX: 25.18 KG/M2 | DIASTOLIC BLOOD PRESSURE: 82 MMHG | TEMPERATURE: 98 F | SYSTOLIC BLOOD PRESSURE: 115 MMHG | HEIGHT: 69 IN | OXYGEN SATURATION: 97 % | HEART RATE: 81 BPM

## 2025-02-22 DIAGNOSIS — J01.90 ACUTE NON-RECURRENT SINUSITIS, UNSPECIFIED LOCATION: Primary | ICD-10-CM

## 2025-02-22 DIAGNOSIS — R51.9 NONINTRACTABLE HEADACHE, UNSPECIFIED CHRONICITY PATTERN, UNSPECIFIED HEADACHE TYPE: ICD-10-CM

## 2025-02-22 PROCEDURE — 99214 OFFICE O/P EST MOD 30 MIN: CPT | Mod: S$GLB,,, | Performed by: NURSE PRACTITIONER

## 2025-02-22 RX ORDER — BUTALBITAL, ACETAMINOPHEN AND CAFFEINE 50; 325; 40 MG/1; MG/1; MG/1
1 TABLET ORAL EVERY 4 HOURS PRN
Qty: 20 TABLET | Refills: 0 | Status: SHIPPED | OUTPATIENT
Start: 2025-02-22 | End: 2025-02-27

## 2025-02-22 RX ORDER — AZITHROMYCIN 250 MG/1
TABLET, FILM COATED ORAL
Qty: 6 TABLET | Refills: 0 | Status: SHIPPED | OUTPATIENT
Start: 2025-02-22 | End: 2025-02-27

## 2025-02-22 NOTE — PROGRESS NOTES
"Subjective:      Patient ID: Gautam Ramirez is a 39 y.o. female.    Vitals:  height is 5' 9" (1.753 m) and weight is 77.1 kg (170 lb). Her oral temperature is 98.3 °F (36.8 °C). Her blood pressure is 115/82 and her pulse is 81. Her respiration is 18 and oxygen saturation is 97%.     Chief Complaint: Otalgia    39-year-old female seen today with a 2 week history of sinus congestion, headache, and a post nasal drip. She has not had a fever. Takes Astelin and Xyzal daily for nasal allergies.     Otalgia   There is pain in the left ear. This is a new problem. Episode onset: x 2 weeks. The problem occurs constantly. The problem has been waxing and waning. There has been no fever. Associated symptoms include coughing and headaches. Pertinent negatives include no neck pain or sore throat. Associated symptoms comments: Nasal congestion   . She has tried acetaminophen and NSAIDs (otc cough meds) for the symptoms. The treatment provided no relief.       Constitution: Negative for chills, sweating, fatigue and fever.   HENT:  Positive for ear pain, congestion, postnasal drip and sinus pressure. Negative for sore throat.    Neck: Negative for neck pain and neck stiffness.   Cardiovascular: Negative.    Eyes: Negative.    Respiratory:  Positive for cough. Negative for sputum production, shortness of breath and wheezing.    Gastrointestinal: Negative.    Endocrine: negative.   Genitourinary: Negative.    Musculoskeletal: Negative.    Skin: Negative.    Allergic/Immunologic: Positive for seasonal allergies.   Neurological:  Positive for headaches. Negative for dizziness.   Hematologic/Lymphatic: Negative.    Psychiatric/Behavioral: Negative.        Objective:     Physical Exam   Constitutional: She is oriented to person, place, and time. normal  HENT:   Head: Normocephalic and atraumatic.   Ears:   Right Ear: Tympanic membrane, external ear and ear canal normal.   Left Ear: External ear and ear canal normal.      Comments: " Left TM with a large clear fluid effusion  Nose: Congestion present. No rhinorrhea.      Comments: Turbinates enlarged  Mouth/Throat: Mucous membranes are moist.   Eyes: Conjunctivae are normal. Pupils are equal, round, and reactive to light. Right eye exhibits no discharge. Left eye exhibits no discharge. Extraocular movement intact   Neck: Neck supple. No neck rigidity present.   Cardiovascular: Normal rate, regular rhythm and normal heart sounds.   No murmur heard.  Pulmonary/Chest: Effort normal and breath sounds normal. No respiratory distress. She has no wheezes. She has no rhonchi. She has no rales.   Abdominal: Normal appearance and bowel sounds are normal. She exhibits no distension. Soft. There is no abdominal tenderness. There is no rebound and no guarding.   Musculoskeletal: Normal range of motion.         General: Normal range of motion.   Lymphadenopathy:     She has no cervical adenopathy.   Neurological: no focal deficit. She is alert, oriented to person, place, and time and at baseline. She displays no weakness. No cranial nerve deficit or sensory deficit. Gait normal.   Skin: Skin is warm and dry.   Psychiatric: Mood normal.       Assessment:     1. Acute non-recurrent sinusitis, unspecified location    2. Nonintractable headache, unspecified chronicity pattern, unspecified headache type        Plan:       Acute non-recurrent sinusitis, unspecified location  -     azithromycin (Z-ADRIAN) 250 MG tablet; Take 2 tablets by mouth on day 1; Take 1 tablet by mouth on days 2-5  Dispense: 6 tablet; Refill: 0    Nonintractable headache, unspecified chronicity pattern, unspecified headache type  -     butalbital-acetaminophen-caffeine -40 mg (FIORICET, ESGIC) -40 mg per tablet; Take 1 tablet by mouth every 4 (four) hours as needed for Headaches.  Dispense: 20 tablet; Refill: 0    Continue with Xyzol, Astelin and Flonase (for3 to 7 days for congestion)

## 2025-03-13 ENCOUNTER — OFFICE VISIT (OUTPATIENT)
Dept: PRIMARY CARE CLINIC | Facility: CLINIC | Age: 40
End: 2025-03-13
Payer: COMMERCIAL

## 2025-03-13 DIAGNOSIS — H66.002 ACUTE SUPPURATIVE OTITIS MEDIA OF LEFT EAR WITHOUT SPONTANEOUS RUPTURE OF TYMPANIC MEMBRANE, RECURRENCE NOT SPECIFIED: Primary | ICD-10-CM

## 2025-03-13 DIAGNOSIS — H66.002 ACUTE SUPPURATIVE OTITIS MEDIA OF LEFT EAR WITHOUT SPONTANEOUS RUPTURE OF TYMPANIC MEMBRANE, RECURRENCE NOT SPECIFIED: ICD-10-CM

## 2025-03-13 RX ORDER — AMOXICILLIN AND CLAVULANATE POTASSIUM 875; 125 MG/1; MG/1
1 TABLET, FILM COATED ORAL 2 TIMES DAILY
Qty: 10 TABLET | Refills: 0 | Status: SHIPPED | OUTPATIENT
Start: 2025-03-13 | End: 2025-03-18

## 2025-03-13 RX ORDER — MECLIZINE HYDROCHLORIDE 25 MG/1
25 TABLET ORAL 3 TIMES DAILY PRN
Qty: 30 TABLET | Refills: 0 | Status: SHIPPED | OUTPATIENT
Start: 2025-03-13

## 2025-03-13 NOTE — TELEPHONE ENCOUNTER
Care Due:                  Date            Visit Type   Department     Provider  --------------------------------------------------------------------------------                                ESTABLISHED                              PATIENT -    [facility]  Last Visit: 03-      [facility] [place]           Eleonora Hung  Next Visit: None Scheduled  None         None Found                                                            Last  Test          Frequency    Reason                     Performed    Due Date  --------------------------------------------------------------------------------    CBC.........  12 months..  ibuprofen, valACYclovir..  03- 03-    Cr..........  12 months..  ibuprofen, valACYclovir..  03- 03-    Health [facility] Embedded Care Due Messages. Reference number: [id number].   3/13/2025 9:12:18 AM CDT

## 2025-03-13 NOTE — PROGRESS NOTES
03/13/2025    Gautam Ramirez  [id number]    CC:  Left ear effusion    Location:[place]  Visit type: audiovisual     Face to Face time with patient: 10 minutes  total minutes of total time spent on the encounter, which includes face to face time and non-face to face time preparing to see the patient (eg, review of tests), Obtaining and/or reviewing separately obtained history, Documenting clinical information in the electronic or other health record, Independently interpreting results (not separately reported) and communicating results to the patient/family/caregiver, or Care coordination (not separately reported).     Each patient to whom he or she provides medical services by telemedicine is:  (1) informed of the relationship between the physician and patient and the respective role of any other health care provider with respect to management of the patient; and (2) notified that he or she may decline to receive medical services by telemedicine and may withdraw from such care at any time.    HPI  This patient is new to me presents virtually for persistent left ear effusion.  Chronic conditions:  sickle yang hemoglobin C.  Two weeks ago was seen in urgent Care noted to have large middle ear effusion.  Was prescribed Z-Tyrone and Fioricet for headaches.  Cold-like symptoms have resolved, but your fullness and dizziness have not.  Unable to tolerate Flonase due to nosebleeds, so has been using Astelin.  No resolution in symptoms.  Patient is followed with ENT before, and never had intervention.  Notes that ever she takes amoxicillin this clears up.      Negative 10 point ROS outside of HPI    Social History     Socioeconomic History    Marital status:    Tobacco Use    Smoking status: Every Day     Current packs/day: 0.25     Average packs/day: 0.3 packs/day for 10.0 years (2.5 ttl pk-yrs)     Types: Cigarettes     Passive exposure: Current    Smokeless tobacco: Never    Tobacco comments:     black and milds    Substance and Sexual Activity    Alcohol use: Yes     Alcohol/week: 1.0 standard drink of alcohol     Types: 1 Cans of beer per week     Comment: socially    Drug use: No    Sexual activity: Yes     Partners: Male     Birth control/protection: None   Social History Narrative    N/A per the patient      Social Drivers of Health     Financial Resource Strain: Low Risk  (3/11/2025)    Overall Financial Resource Strain (CARDIA)     Difficulty of Paying Living Expenses: Not hard at all   Food Insecurity: No Food Insecurity (3/11/2025)    Hunger Vital Sign     Worried About Running Out of Food in the Last Year: Never true     Ran Out of Food in the Last Year: Never true   Transportation Needs: No Transportation Needs (3/11/2025)    PRAPARE - Transportation     Lack of Transportation (Medical): No     Lack of Transportation (Non-Medical): No   Physical Activity: Sufficiently Active (3/11/2025)    Exercise Vital Sign     Days of Exercise per Week: 5 days     Minutes of Exercise per Session: 150+ min   Stress: Stress Concern Present (3/11/2025)    [employer] - Occupational Stress Questionnaire     Feeling of Stress : To some extent   Housing Stability: Low Risk  (3/11/2025)    Housing Stability Vital Sign     Unable to Pay for Housing in the Last Year: No     Number of Times Moved in the Last Year: 0     Homeless in the Last Year: No         Current Medications[1]      Physical Exam  Vitals unable to obtain    Gen: well appearing  Resp: speaks in full sentences. Non labored breathing  Cv: non-diaphoretic    1. Acute suppurative otitis media of left ear without spontaneous rupture of tympanic membrane, recurrence not specified  - amoxicillin-clavulanate 875-125mg (AUGMENTIN) 875-125 mg per tablet; Take 1 tablet by mouth 2 (two) times daily. for 5 days  Dispense: 10 tablet; Refill: 0  - meclizine (ANTIVERT) 25 mg tablet; Take 1 tablet (25 mg total) by mouth 3 (three) times daily as needed for  Dizziness.  Dispense: 30 tablet; Refill: 0  Discussed supportive care and watchful waiting      RTC if no improvement within 2 weeks  Eleonora Hung MD  Family Medicine           [1]   Current Outpatient Medications:     azelastine (ASTELIN) 137 mcg (0.1 %) nasal spray, 1 spray (137 mcg total) by Nasal route 2 (two) times daily., Disp: 30 mL, Rfl: 1    folic acid (FOLVITE) 1 MG tablet, Take 1 mg by mouth once daily., Disp: , Rfl:     ibuprofen (ADVIL,MOTRIN) 600 MG tablet, TAKE 1 TABLET BY MOUTH EVERY 6 HOURS, Disp: 90 tablet, Rfl: 1    ipratropium (ATROVENT) 21 mcg (0.03 %) nasal spray, SPRAY 2 SPRAYS BY EACH NOSTRIL ROUTE 2 TIMES DAILY., Disp: 90 mL, Rfl: 2    levocetirizine (XYZAL) 5 MG tablet, Take 1 tablet (5 mg total) by mouth once daily., Disp: 90 tablet, Rfl: 3    moxifloxacin (VIGAMOX) 0.5 % ophthalmic solution, Instill one drop into the right eye every two hours while awake, Disp: 3 mL, Rfl: 2    valACYclovir (VALTREX) 1000 MG tablet, TAKE 1 TABLET (1,000 MG TOTAL) BY MOUTH EVERY 12 (TWELVE) HOURS. FOR 3 DAYS AS NEEDED, Disp: 30 tablet, Rfl: 8

## 2025-03-18 RX ORDER — PROMETHAZINE HYDROCHLORIDE 25 MG/1
25 TABLET ORAL EVERY 6 HOURS PRN
Qty: 30 TABLET | Refills: 0 | Status: SHIPPED | OUTPATIENT
Start: 2025-03-18

## 2025-04-11 ENCOUNTER — LAB VISIT (OUTPATIENT)
Dept: PRIMARY CARE CLINIC | Facility: CLINIC | Age: 40
End: 2025-04-11
Payer: COMMERCIAL

## 2025-04-11 ENCOUNTER — OFFICE VISIT (OUTPATIENT)
Dept: PRIMARY CARE CLINIC | Facility: CLINIC | Age: 40
End: 2025-04-11
Payer: COMMERCIAL

## 2025-04-11 ENCOUNTER — OFFICE VISIT (OUTPATIENT)
Dept: OPTOMETRY | Facility: CLINIC | Age: 40
End: 2025-04-11
Payer: COMMERCIAL

## 2025-04-11 VITALS
OXYGEN SATURATION: 100 % | BODY MASS INDEX: 25.7 KG/M2 | TEMPERATURE: 98 F | WEIGHT: 173.5 LBS | HEART RATE: 95 BPM | HEIGHT: 69 IN | SYSTOLIC BLOOD PRESSURE: 109 MMHG | DIASTOLIC BLOOD PRESSURE: 71 MMHG

## 2025-04-11 DIAGNOSIS — H52.13 MYOPIA OF BOTH EYES: ICD-10-CM

## 2025-04-11 DIAGNOSIS — E04.1 THYROID NODULE: ICD-10-CM

## 2025-04-11 DIAGNOSIS — M54.12 CERVICAL RADICULOPATHY: ICD-10-CM

## 2025-04-11 DIAGNOSIS — H36.819: ICD-10-CM

## 2025-04-11 DIAGNOSIS — Z11.3 SCREENING EXAMINATION FOR STD (SEXUALLY TRANSMITTED DISEASE): ICD-10-CM

## 2025-04-11 DIAGNOSIS — D57.1: ICD-10-CM

## 2025-04-11 DIAGNOSIS — J06.9 UPPER RESPIRATORY TRACT INFECTION, UNSPECIFIED TYPE: Primary | ICD-10-CM

## 2025-04-11 DIAGNOSIS — Z00.00 ROUTINE HEALTH MAINTENANCE: ICD-10-CM

## 2025-04-11 DIAGNOSIS — G89.29 CHRONIC LEFT EAR PAIN: ICD-10-CM

## 2025-04-11 DIAGNOSIS — H92.02 CHRONIC LEFT EAR PAIN: ICD-10-CM

## 2025-04-11 DIAGNOSIS — Z46.0 FITTING AND ADJUSTMENT OF SPECTACLES AND CONTACT LENSES: Primary | ICD-10-CM

## 2025-04-11 DIAGNOSIS — G89.29 CHRONIC NONINTRACTABLE HEADACHE, UNSPECIFIED HEADACHE TYPE: ICD-10-CM

## 2025-04-11 DIAGNOSIS — Z13.5 SCREENING FOR EYE CONDITION: ICD-10-CM

## 2025-04-11 DIAGNOSIS — Z01.00 ENCOUNTER FOR ROUTINE EYE AND VISION EXAMINATION: Primary | ICD-10-CM

## 2025-04-11 DIAGNOSIS — Z97.3 WEARS CONTACT LENSES: ICD-10-CM

## 2025-04-11 DIAGNOSIS — R51.9 CHRONIC NONINTRACTABLE HEADACHE, UNSPECIFIED HEADACHE TYPE: ICD-10-CM

## 2025-04-11 LAB
ABSOLUTE EOSINOPHIL (OHS): 0.17 K/UL
ABSOLUTE MONOCYTE (OHS): 1.33 K/UL (ref 0.3–1)
ABSOLUTE NEUTROPHIL COUNT (OHS): 20.96 K/UL (ref 1.8–7.7)
ALBUMIN SERPL BCP-MCNC: 4.1 G/DL (ref 3.5–5.2)
ALP SERPL-CCNC: 90 UNIT/L (ref 40–150)
ALT SERPL W/O P-5'-P-CCNC: 16 UNIT/L (ref 10–44)
ANION GAP (OHS): 10 MMOL/L (ref 8–16)
ANISOCYTOSIS BLD QL SMEAR: SLIGHT
AST SERPL-CCNC: 20 UNIT/L (ref 11–45)
BASOPHILS # BLD AUTO: 0.11 K/UL
BASOPHILS NFR BLD AUTO: 0.4 %
BILIRUB SERPL-MCNC: 0.9 MG/DL (ref 0.1–1)
BUN SERPL-MCNC: 9 MG/DL (ref 6–20)
CALCIUM SERPL-MCNC: 9.2 MG/DL (ref 8.7–10.5)
CHLORIDE SERPL-SCNC: 108 MMOL/L (ref 95–110)
CHOLEST SERPL-MCNC: 175 MG/DL (ref 120–199)
CHOLEST/HDLC SERPL: 4.5 {RATIO} (ref 2–5)
CO2 SERPL-SCNC: 23 MMOL/L (ref 23–29)
CREAT SERPL-MCNC: 0.7 MG/DL (ref 0.5–1.4)
DACRYOCYTES BLD QL SMEAR: NORMAL
EAG (OHS): ABNORMAL
ERYTHROCYTE [DISTWIDTH] IN BLOOD BY AUTOMATED COUNT: 14.2 % (ref 11.5–14.5)
FERRITIN SERPL-MCNC: 183 NG/ML (ref 20–300)
GFR SERPLBLD CREATININE-BSD FMLA CKD-EPI: >60 ML/MIN/1.73/M2
GLUCOSE SERPL-MCNC: 65 MG/DL (ref 70–110)
HBA1C MFR BLD: <4 % (ref 4–5.6)
HCT VFR BLD AUTO: 31.7 % (ref 37–48.5)
HDLC SERPL-MCNC: 39 MG/DL (ref 40–75)
HDLC SERPL: 22.3 % (ref 20–50)
HGB BLD-MCNC: 11.1 GM/DL (ref 12–16)
HIV 1+2 AB+HIV1 P24 AG SERPL QL IA: NORMAL
IMM GRANULOCYTES # BLD AUTO: 0.05 K/UL (ref 0–0.04)
IMM GRANULOCYTES NFR BLD AUTO: 0.2 % (ref 0–0.5)
IRON SATN MFR SERPL: 13 % (ref 20–50)
IRON SERPL-MCNC: 32 UG/DL (ref 30–160)
LDLC SERPL CALC-MCNC: 113.6 MG/DL (ref 63–159)
LYMPHOCYTES # BLD AUTO: 2.3 K/UL (ref 1–4.8)
MCH RBC QN AUTO: 29.8 PG (ref 27–31)
MCHC RBC AUTO-ENTMCNC: 35 G/DL (ref 32–36)
MCV RBC AUTO: 85 FL (ref 82–98)
NONHDLC SERPL-MCNC: 136 MG/DL
NUCLEATED RBC (/100WBC) (OHS): 1 /100 WBC
OVALOCYTES BLD QL SMEAR: NORMAL
PLATELET # BLD AUTO: 273 K/UL (ref 150–450)
PLATELET BLD QL SMEAR: NORMAL
PMV BLD AUTO: 12.2 FL (ref 9.2–12.9)
POIKILOCYTOSIS BLD QL SMEAR: SLIGHT
POTASSIUM SERPL-SCNC: 4.2 MMOL/L (ref 3.5–5.1)
PROT SERPL-MCNC: 7 GM/DL (ref 6–8.4)
RBC # BLD AUTO: 3.73 M/UL (ref 4–5.4)
RELATIVE EOSINOPHIL (OHS): 0.7 %
RELATIVE LYMPHOCYTE (OHS): 9.2 % (ref 18–48)
RELATIVE MONOCYTE (OHS): 5.3 % (ref 4–15)
RELATIVE NEUTROPHIL (OHS): 84.2 % (ref 38–73)
SCHISTOCYTES BLD QL SMEAR: NORMAL
SODIUM SERPL-SCNC: 141 MMOL/L (ref 136–145)
T PALLIDUM IGG+IGM SER QL: NORMAL
TIBC SERPL-MCNC: 255 UG/DL (ref 250–450)
TRANSFERRIN SERPL-MCNC: 172 MG/DL (ref 200–375)
TRIGL SERPL-MCNC: 112 MG/DL (ref 30–150)
TSH SERPL-ACNC: 0.51 UIU/ML (ref 0.4–4)
WBC # BLD AUTO: 24.92 K/UL (ref 3.9–12.7)

## 2025-04-11 PROCEDURE — 81515 NFCT DS BV&VAGINITIS DNA ALG: CPT | Performed by: STUDENT IN AN ORGANIZED HEALTH CARE EDUCATION/TRAINING PROGRAM

## 2025-04-11 PROCEDURE — 99999 PR PBB SHADOW E&M-EST. PATIENT-LVL III: CPT | Mod: PBBFAC,,, | Performed by: OPTOMETRIST

## 2025-04-11 PROCEDURE — 3008F BODY MASS INDEX DOCD: CPT | Mod: CPTII,S$GLB,, | Performed by: STUDENT IN AN ORGANIZED HEALTH CARE EDUCATION/TRAINING PROGRAM

## 2025-04-11 PROCEDURE — 84443 ASSAY THYROID STIM HORMONE: CPT

## 2025-04-11 PROCEDURE — 87389 HIV-1 AG W/HIV-1&-2 AB AG IA: CPT

## 2025-04-11 PROCEDURE — 99214 OFFICE O/P EST MOD 30 MIN: CPT | Mod: S$GLB,,, | Performed by: STUDENT IN AN ORGANIZED HEALTH CARE EDUCATION/TRAINING PROGRAM

## 2025-04-11 PROCEDURE — 3044F HG A1C LEVEL LT 7.0%: CPT | Mod: CPTII,S$GLB,, | Performed by: STUDENT IN AN ORGANIZED HEALTH CARE EDUCATION/TRAINING PROGRAM

## 2025-04-11 PROCEDURE — 80061 LIPID PANEL: CPT

## 2025-04-11 PROCEDURE — 80053 COMPREHEN METABOLIC PANEL: CPT

## 2025-04-11 PROCEDURE — 3074F SYST BP LT 130 MM HG: CPT | Mod: CPTII,S$GLB,, | Performed by: STUDENT IN AN ORGANIZED HEALTH CARE EDUCATION/TRAINING PROGRAM

## 2025-04-11 PROCEDURE — 84466 ASSAY OF TRANSFERRIN: CPT

## 2025-04-11 PROCEDURE — 85025 COMPLETE CBC W/AUTO DIFF WBC: CPT

## 2025-04-11 PROCEDURE — 82728 ASSAY OF FERRITIN: CPT

## 2025-04-11 PROCEDURE — 87491 CHLMYD TRACH DNA AMP PROBE: CPT | Performed by: STUDENT IN AN ORGANIZED HEALTH CARE EDUCATION/TRAINING PROGRAM

## 2025-04-11 PROCEDURE — 99999 PR PBB SHADOW E&M-EST. PATIENT-LVL IV: CPT | Mod: PBBFAC,,, | Performed by: STUDENT IN AN ORGANIZED HEALTH CARE EDUCATION/TRAINING PROGRAM

## 2025-04-11 PROCEDURE — 86593 SYPHILIS TEST NON-TREP QUANT: CPT

## 2025-04-11 PROCEDURE — 83036 HEMOGLOBIN GLYCOSYLATED A1C: CPT

## 2025-04-11 PROCEDURE — 3078F DIAST BP <80 MM HG: CPT | Mod: CPTII,S$GLB,, | Performed by: STUDENT IN AN ORGANIZED HEALTH CARE EDUCATION/TRAINING PROGRAM

## 2025-04-11 RX ORDER — BUTALBITAL, ACETAMINOPHEN AND CAFFEINE 50; 325; 40 MG/1; MG/1; MG/1
1 TABLET ORAL EVERY 4 HOURS PRN
Qty: 30 TABLET | Refills: 1 | Status: SHIPPED | OUTPATIENT
Start: 2025-04-11 | End: 2025-04-11

## 2025-04-11 NOTE — PROGRESS NOTES
04/11/2025    Gautam Ramirez  [id number]    Chief Complaint   Patient presents with    Otalgia     Severe ear pain as of Wednesday (2d ago).     Headache    Generalized Body Aches    Nasal Congestion    Annual Exam       HPI    History of Present Illness    HPI:  Gautam presents with cold symptoms, including nasal congestion and ear pain, which started two days ago after exposure to her sick son. She also reports ongoing right arm numbness and recurrent headaches. Gautam reports cold symptoms that began 2 days ago after her son was ill with vomiting and fever. She initially had postnasal drip, followed by a general decline in her condition. She has been taking Motrin for symptom relief. She discontinued Flonase and Azelastine nasal sprays due to blood in her sputum and nasal irritation. She has been coughing up yellow and green sputum for the last 2 days.    She complains of ear pain, particularly in one ear which is sore. She mentions recurring ear problems, especially with weather changes. She was treated with Augmentin for a left ear issue about 1-2 months ago, which cleared the infection but the pain persisted. An ENT evaluation about 2 years ago indicated she did not require tubes.    She reports numbness in her right arm for a few months, extending from the top of her arm to her fingers, occurring when awake and moving around. She denies associated neck or shoulder pain, or history of injury. She had been going to the gym for 6-7 months prior to symptom onset but stopped 2 months ago with no change in the condition. She occasionally takes ibuprofen for severe discomfort.    She has intermittent dizziness and headaches. Phenergan was effective for dizziness but caused drowsiness. For headaches, ibuprofen is ineffective, but Fioricet helps when they are severe and accompanied by vertigo.    She has sickle cell trait and has not had a crisis in years. Her last hematologist evaluation a few years ago showed  no symptoms requiring specific follow-up.    MEDICATIONS:  Gautam is on Motrin (ibuprofen) as needed for pain. She uses Zyrtec nightly for allergies. Phenergan (promethazine) is taken as needed for dizziness, though it causes drowsiness. She takes Fiorcet as needed for severe headaches/migraines and Valtrex (valacyclovir) as needed. Gautam has discontinued Flonase and Azelastine nasal sprays due to irritation and blood in mucus.    MEDICAL HISTORY:  Gautam has a history of a thyroid nodule, which was previously monitored at a cancer center. 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 Gautam receives the flu vaccine annually and received the pneumonia vaccine last year. Gautam is currently using condoms for contraception.    FAMILY HISTORY:  Family history is significant for brother-in-law who  a few weeks ago from liver cancer at a young age.    TEST RESULTS:  Beatrizs thyroid blood level was last checked 5 years ago in 2020. She undergoes annual STD screenings.    ALLERGIES:  Gautam has an allergy to steroids, which caused a sickle cell crisis during pregnancy.    SOCIAL HISTORY:  Occupation: Employed      ROS:  General: -fever, -chills, -fatigue, -weight gain, -weight loss  Eyes: -vision changes, -redness, -discharge  ENT: +ear pain, +nasal congestion, -sore throat, +nasal irritation, +post nasal drip  Cardiovascular: -chest pain, -palpitations, -lower extremity edema  Respiratory: -cough, -shortness of breath, +hemoptysis, +productive cough  Gastrointestinal: -abdominal pain, -nausea, -vomiting, -diarrhea, -constipation, -blood in stool  Genitourinary: -dysuria, -hematuria, -frequency  Musculoskeletal: -joint pain, -muscle pain  Skin: -rash, -lesion  Neurological: +headache, +dizziness, +numbness, -tingling  Psychiatric: -anxiety, -depression, -sleep difficulty               Negative 10 point ROS outside of HPI    Social History     Socioeconomic History    Marital  status:    Tobacco Use    Smoking status: Every Day     Current packs/day: 0.25     Average packs/day: 0.3 packs/day for 10.0 years (2.5 ttl pk-yrs)     Types: Cigarettes     Passive exposure: Current    Smokeless tobacco: Never    Tobacco comments:     black and milds   Substance and Sexual Activity    Alcohol use: Yes     Alcohol/week: 1.0 standard drink of alcohol     Types: 1 Cans of beer per week     Comment: socially    Drug use: No    Sexual activity: Yes     Partners: Male     Birth control/protection: None   Social History Narrative    N/A per the patient      Social Drivers of Health     Financial Resource Strain: Low Risk  (3/11/2025)    Overall Financial Resource Strain (CARDIA)     Difficulty of Paying Living Expenses: Not hard at all   Food Insecurity: No Food Insecurity (3/11/2025)    Hunger Vital Sign     Worried About Running Out of Food in the Last Year: Never true     Ran Out of Food in the Last Year: Never true   Transportation Needs: No Transportation Needs (3/11/2025)    PRAPARE - Transportation     Lack of Transportation (Medical): No     Lack of Transportation (Non-Medical): No   Physical Activity: Sufficiently Active (3/11/2025)    Exercise Vital Sign     Days of Exercise per Week: 5 days     Minutes of Exercise per Session: 150+ min   Stress: Stress Concern Present (3/11/2025)    [employer] - Occupational Stress Questionnaire     Feeling of Stress : To some extent   Housing Stability: Low Risk  (3/11/2025)    Housing Stability Vital Sign     Unable to Pay for Housing in the Last Year: No     Number of Times Moved in the Last Year: 0     Homeless in the Last Year: No         Current Medications[1]      Physical Exam  Vitals:    04/11/25 0943   BP: 109/71   Pulse: 95   Temp: 98.4 °F (36.9 °C)       Gen: well appearing, NAD  Resp: non labored breathing, no crackles, no wheezes, CTAB  Nose: bilateral congestion of turbinates with erythema, sinuses non tender  to touch,   Ears: TM wnl no effusions  Throat: no erythema, no exudates  CV: RRR no murmur, gallops, rubs, no LE edema  Abd: soft nontender BS present no organomegaly  Shoulder:    Inspection:no obvious deformity or asymmetry   Palpation: bilateral AC joint and bicipital groove tenderness   ROM: full active and passive ROM   Rotator cuff: negative       Problem List Items Addressed This Visit       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 Upper respiratory tract infection, unspecified type  - POCT Influenza A/B Molecular negative   - POCT COVID-19 Rapid Screening negative     2. Routine health maintenance  - CBC Auto Differential; Future  - Comprehensive Metabolic Panel; Future  - Hemoglobin A1C; Future  - TSH; Future  - Lipid Panel; Future    3. Screening examination for STD (sexually transmitted disease)  - Vaginosis Screen by DNA Probe  - C. trachomatis/N. gonorrhoeae by AMP DNA  - HIV 1/2 Ag/Ab (4th Gen); Future  - Treponema Pallidium Antibodies IgG, IgM; Future    4. Thyroid nodule  7/2021:1.1 cm right thyroid lobe nodule, no FNA recommended, it appears smaller compared to the prior exam.   Slightly heterogeneous thyroid gland with increased vascularity suggestive of thyroiditis.  - US Thyroid; Future    5. Sickle cell disease without crisis, with nonproliferative sickle cell retinopathy, unspecified laterality  - Iron and TIBC; Future  - Ferritin; Future    6. Chronic left ear pain  - Ambulatory referral/consult to ENT; Future    7. Cervical radiculopathy  - X-Ray Cervical Spine AP And Lateral; Future  - EMG W/ ULTRASOUND AND NERVE CONDUCTION TEST 1  Extremity; Future    8. Chronic nonintractable headache, unspecified headache type  Will refill fiorecit      RTC in as needed for routine care    Eleonora Hung MD  Family Medicine           [1]   Current Outpatient Medications:     azelastine (ASTELIN) 137 mcg (0.1 %) nasal spray, 1 spray (137 mcg total) by Nasal route 2 (two) times daily., Disp: 30 mL, Rfl: 1    ibuprofen (ADVIL,MOTRIN) 600 MG tablet, TAKE 1 TABLET BY MOUTH EVERY 6 HOURS, Disp: 90 tablet, Rfl: 1    ipratropium (ATROVENT) 21 mcg (0.03 %) nasal spray, SPRAY 2 SPRAYS BY EACH NOSTRIL ROUTE 2 TIMES DAILY., Disp: 90 mL, Rfl: 2    levocetirizine (XYZAL) 5 MG tablet, Take 1 tablet (5 mg total) by mouth once daily., Disp: 90 tablet, Rfl: 3    folic acid (FOLVITE) 1 MG tablet, Take 1 mg by mouth once daily., Disp: , Rfl:     moxifloxacin (VIGAMOX) 0.5 % ophthalmic solution, Instill one drop into the right eye every two hours while awake, Disp: 3 mL, Rfl: 2    promethazine (PHENERGAN) 25 MG tablet, Take 1 tablet (25 mg total) by mouth every 6 (six) hours as needed for Nausea. (Patient not taking: Reported on 4/11/2025), Disp: 30 tablet, Rfl: 0    valACYclovir (VALTREX) 1000 MG tablet, TAKE 1 TABLET (1,000 MG TOTAL) BY MOUTH EVERY 12 (TWELVE) HOURS. FOR 3 DAYS AS NEEDED, Disp: 30 tablet, Rfl: 8

## 2025-04-11 NOTE — PROGRESS NOTES
HPI    40 Y/o female is here for routine eye exam with C/o pt say's she has been   headaches for the past three months, pt also say's that her eye are   sensitive to light, and that she always has to wear shades when in the sun   light.  Pt denies pain and discomfort   No f/f    Eye med:   Last edited by Herb Veronica MA on 4/11/2025  2:30 PM.            Assessment /Plan     For exam results, see Encounter Report.    Encounter for routine eye and vision examination    Screening for eye condition    Myopia of both eyes    Wears contact lenses      Good fit w CLs, but having some presby issues--discussed otc readers over CLs  For spex discussed PAls/adaptation  Retinal scarring OU from sickle cell hx-no active retinopathy today.  Advised yearly DFE    PLAN:    Wrote spex/CLRx  Continue Daily Wear schedule.  NO SLEEPING IN CONTACT LENSES. Clean and disinfect nightly.  exchange monthly.    Rtc 1 yr/prn

## 2025-04-13 LAB
BACTERIAL VAGINOSIS DNA (OHS): DETECTED
C TRACH DNA SPEC QL NAA+PROBE: NOT DETECTED
CANDIDA GLABRATA/KRUSEI DNA (OHS): NOT DETECTED
CANDIDA SPECIES DNA (OHS): NOT DETECTED
CTGC SOURCE (OHS) ORD-325: NORMAL
N GONORRHOEA DNA UR QL NAA+PROBE: NOT DETECTED
TRICHOMONAS VAGINALIS DNA (OHS): NOT DETECTED

## 2025-04-14 ENCOUNTER — PATIENT MESSAGE (OUTPATIENT)
Dept: PRIMARY CARE CLINIC | Facility: CLINIC | Age: 40
End: 2025-04-14
Payer: COMMERCIAL

## 2025-04-14 ENCOUNTER — HOSPITAL ENCOUNTER (OUTPATIENT)
Dept: RADIOLOGY | Facility: HOSPITAL | Age: 40
Discharge: HOME | End: 2025-04-14
Attending: STUDENT IN AN ORGANIZED HEALTH CARE EDUCATION/TRAINING PROGRAM
Payer: COMMERCIAL

## 2025-04-14 ENCOUNTER — RESULTS FOLLOW-UP (OUTPATIENT)
Dept: PRIMARY CARE CLINIC | Facility: CLINIC | Age: 40
End: 2025-04-14

## 2025-04-14 DIAGNOSIS — D72.829 LEUKOCYTOSIS, UNSPECIFIED TYPE: ICD-10-CM

## 2025-04-14 DIAGNOSIS — N76.0 BACTERIAL VAGINOSIS: Primary | ICD-10-CM

## 2025-04-14 DIAGNOSIS — M54.12 CERVICAL RADICULOPATHY: ICD-10-CM

## 2025-04-14 DIAGNOSIS — E04.1 THYROID NODULE: ICD-10-CM

## 2025-04-14 DIAGNOSIS — B96.89 BACTERIAL VAGINOSIS: Primary | ICD-10-CM

## 2025-04-14 PROCEDURE — 76536 US EXAM OF HEAD AND NECK: CPT | Mod: TC

## 2025-04-14 PROCEDURE — 72040 X-RAY EXAM NECK SPINE 2-3 VW: CPT | Mod: TC

## 2025-04-14 PROCEDURE — 72040 X-RAY EXAM NECK SPINE 2-3 VW: CPT | Mod: 26,,, | Performed by: RADIOLOGY

## 2025-04-14 PROCEDURE — 76536 US EXAM OF HEAD AND NECK: CPT | Mod: 26,,, | Performed by: RADIOLOGY

## 2025-04-14 RX ORDER — METRONIDAZOLE 500 MG/1
500 TABLET ORAL EVERY 12 HOURS
Qty: 10 TABLET | Refills: 0 | Status: SHIPPED | OUTPATIENT
Start: 2025-04-14 | End: 2025-04-19

## 2025-04-16 ENCOUNTER — TELEPHONE (OUTPATIENT)
Dept: PRIMARY CARE CLINIC | Facility: CLINIC | Age: 40
End: 2025-04-16
Payer: COMMERCIAL

## 2025-04-16 ENCOUNTER — E-CONSULT (OUTPATIENT)
Dept: HEMATOLOGY/ONCOLOGY | Facility: CLINIC | Age: 40
End: 2025-04-16
Payer: COMMERCIAL

## 2025-04-16 DIAGNOSIS — D72.829 LEUKOCYTOSIS, UNSPECIFIED TYPE: Primary | ICD-10-CM

## 2025-04-16 RX ORDER — BUTALBITAL, ACETAMINOPHEN AND CAFFEINE 50; 325; 40 MG/1; MG/1; MG/1
1 TABLET ORAL EVERY 4 HOURS PRN
Qty: 30 TABLET | Refills: 1 | Status: SHIPPED | OUTPATIENT
Start: 2025-04-16 | End: 2025-04-16

## 2025-04-16 RX ORDER — BUTALBITAL, ACETAMINOPHEN AND CAFFEINE 50; 325; 40 MG/1; MG/1; MG/1
1 TABLET ORAL EVERY 6 HOURS PRN
Qty: 30 TABLET | Refills: 1 | Status: SHIPPED | OUTPATIENT
Start: 2025-04-16

## 2025-04-16 NOTE — TELEPHONE ENCOUNTER
----- Message from Eleonora Hung MD sent at 4/16/2025  9:25 AM CDT -----  Regarding: Fioricet refill  Please let patient know that we are unable to electronically prescribe Fioricet to [facility] or [facility].  I have sent it to the [facility] on [address] and [address], which is close to her regular pharmacy.

## 2025-04-16 NOTE — CONSULTS
[facility] - Hematology Oncology  Response for E-Consult     Patient Name: Gautam Ramirez  MRN: [id number]  Primary Care Provider: Jeffery Chawla MD   Requesting Provider: Eleonora Hung MD  E-Consult to [facility]  Consult performed by: Yaya Addison MD  Consult ordered by: Eleonora Hung MD  Reason for consult: are her cbc changes expected with Hgb SC?  Assessment/Recommendations: 04/11/25 11:10  WBC: 24.92 (H)  RBC: 3.73 (L)  Hemoglobin: 11.1 (L)  Hematocrit: 31.7 (L)  MCV: 85  MCH: 29.8  MCHC: 35.0  RDW: 14.2  Platelet Count: 273  MPV: 12.2  Platelet Estimate: Appears Normal  Neut %: 84.2 (H)  Lymph %: 9.2 (L)  Mono %: 5.3  Eos %: 0.7  Basophil %: 0.4  Immature Granulocytes: 0.2  Gran # (ANC): 20.96 (H)  Lymph #: 2.30  Mono #: 1.33 (H)  Eos #: 0.17  Baso #: 0.11  Immature Grans (Abs): 0.05 (H)  nRBC: 1 (H)  Ovalocytes: Occasional  Aniso: Slight  Poikilocytosis: Slight  Teardrop Cells: Occasional  Fragmented Cells: Occasional  Iron: 32  TIBC: 255  Transferrin: 172 (L)  Ferritin: 183.0  Iron Saturation: 13 (L)  Sodium: 141  Potassium: 4.2  Chloride: 108  CO2: 23  Anion Gap: 10  BUN: 9  Creatinine: 0.7  eGFR: >60  Glucose: 65 (L)  Calcium: 9.2  ALP: 90  PROTEIN TOTAL: 7.0  Albumin: 4.1  BILIRUBIN TOTAL: 0.9  AST: 20  ALT: 16  Cholesterol Total: 175  HDL: 39 (L)  HDL/Cholesterol Ratio: 22.3  Non-HDL Cholesterol: 136  Total Cholesterol/HDL Ratio: 4.5  Triglycerides: 112  LDL Cholesterol: 113.6  Hemoglobin A1C External: <4.0 (L)  Estimated Avg Glucose: See Comments  TSH: 0.508  HIV 1/2 Ag/Ab: Non-Reactive  Treponema Pallidum Antibodies (IgG, IgM): Non-Reactive  Morphology: Rpt      (H): Data is abnormally high  (L): Data is abnormally low  Rpt: View report in Results Review for more information          Recommendation: Marked leukocytosis, low iron saturation are not expected in hemoglobin SC disease. Recommend iron supplementation, consider further evaluation for causes of iron deficiency (query menstrual history  in women of child-bearing age, screen for GI blood losses) and replete as indicated (PO iron usually a good first step), and consider further evaluation for leukocytosis. Elevated WBC with neutrophil predominance suggestive of infectious/inflammatory process--clinical correlation recommended. Consider repeat CBC with differential to evaluate for resolution within 4-6 weeks if acute infectious/inflammatory etiology suspected. If no clinical history to suggest infectious/inflammatory condition, evaluation for primary causes of neutrophilic leukocytosis would be reasonable at this juncture--rule out MPN (MPN panel (LAB 3614), BCR/ABL PCR)    Additional future steps to consider: na    Total time of Consultation: 10 minute    I did not speak to the requesting provider verbally about this.     *This eConsult is based on the clinical data available to me and is furnished without benefit of a physical examination. The eConsult will need to be interpreted in light of any clinical issues or changes in patient status not available to me at the time of filing this eConsults. Significant changes in patient condition or level of acuity should result in immediate formal consultation and reevaluation. Please alert me if you have further questions.    Thank you for this eConsult referral.     Yaya Addison MD  [facility] - Hematology Oncology

## 2025-04-21 ENCOUNTER — TELEPHONE (OUTPATIENT)
Dept: PRIMARY CARE CLINIC | Facility: CLINIC | Age: 40
End: 2025-04-21
Payer: COMMERCIAL

## 2025-04-21 RX ORDER — FERROUS SULFATE 324(65)MG
TABLET, DELAYED RELEASE (ENTERIC COATED) ORAL
Qty: 30 TABLET | Refills: 2 | Status: SHIPPED | OUTPATIENT
Start: 2025-04-21

## 2025-04-21 NOTE — TELEPHONE ENCOUNTER
----- Message from Eleonora Hung MD sent at 4/21/2025  8:22 AM CDT -----  Your x-ray was normal and does not show the cause of this sensations on your arm.    Please let patient know the eConsult for her abnormal CBC was completed.  Based on the recommendations of the econsult, I have sent iron to her pharmacy to take every other day, With a plan to repeat   her CBC and speciality testing in 4-6 weeks.  ----- Message -----  From: Phyllis Rad Results In  Sent: 4/15/2025   8:53 AM CDT  To: Eleonora Hung MD

## 2025-04-23 DIAGNOSIS — M25.569 KNEE PAIN, UNSPECIFIED CHRONICITY, UNSPECIFIED LATERALITY: ICD-10-CM

## 2025-04-23 NOTE — TELEPHONE ENCOUNTER
No care due was identified.  [facility] Embedded Care Due Messages. Reference number: [id number].   4/23/2025 3:49:47 PM CDT

## 2025-04-25 RX ORDER — IBUPROFEN 600 MG/1
600 TABLET ORAL EVERY 6 HOURS PRN
Qty: 90 TABLET | Refills: 0 | Status: SHIPPED | OUTPATIENT
Start: 2025-04-25

## 2025-05-08 DIAGNOSIS — Z12.31 OTHER SCREENING MAMMOGRAM: ICD-10-CM

## 2025-05-19 ENCOUNTER — PATIENT MESSAGE (OUTPATIENT)
Dept: PRIMARY CARE CLINIC | Facility: CLINIC | Age: 40
End: 2025-05-19
Payer: COMMERCIAL

## 2025-05-20 DIAGNOSIS — H36.819: Primary | ICD-10-CM

## 2025-05-20 DIAGNOSIS — D57.1: Primary | ICD-10-CM

## 2025-05-26 DIAGNOSIS — M25.569 KNEE PAIN, UNSPECIFIED CHRONICITY, UNSPECIFIED LATERALITY: ICD-10-CM

## 2025-05-26 NOTE — TELEPHONE ENCOUNTER
No care due was identified.  [facility] Embedded Care Due Messages. Reference number: [id number].   5/26/2025 12:12:12 AM CDT

## 2025-05-27 ENCOUNTER — LAB VISIT (OUTPATIENT)
Dept: LAB | Facility: HOSPITAL | Age: 40
End: 2025-05-27
Attending: STUDENT IN AN ORGANIZED HEALTH CARE EDUCATION/TRAINING PROGRAM
Payer: COMMERCIAL

## 2025-05-27 DIAGNOSIS — D57.1: ICD-10-CM

## 2025-05-27 DIAGNOSIS — D72.829 LEUKOCYTOSIS, UNSPECIFIED TYPE: ICD-10-CM

## 2025-05-27 DIAGNOSIS — H36.819: ICD-10-CM

## 2025-05-27 LAB
ABSOLUTE EOSINOPHIL (OHS): 0.42 K/UL
ABSOLUTE MONOCYTE (OHS): 0.85 K/UL (ref 0.3–1)
ABSOLUTE NEUTROPHIL COUNT (OHS): 5.1 K/UL (ref 1.8–7.7)
BASOPHILS # BLD AUTO: 0.08 K/UL
BASOPHILS NFR BLD AUTO: 0.8 %
ERYTHROCYTE [DISTWIDTH] IN BLOOD BY AUTOMATED COUNT: 13.8 % (ref 11.5–14.5)
HCT VFR BLD AUTO: 31.9 % (ref 37–48.5)
HGB BLD-MCNC: 11.6 GM/DL (ref 12–16)
IMM GRANULOCYTES # BLD AUTO: 0.02 K/UL (ref 0–0.04)
IMM GRANULOCYTES NFR BLD AUTO: 0.2 % (ref 0–0.5)
LYMPHOCYTES # BLD AUTO: 3.84 K/UL (ref 1–4.8)
MCH RBC QN AUTO: 29.9 PG (ref 27–31)
MCHC RBC AUTO-ENTMCNC: 36.4 G/DL (ref 32–36)
MCV RBC AUTO: 82 FL (ref 82–98)
NUCLEATED RBC (/100WBC) (OHS): 0 /100 WBC
PLATELET # BLD AUTO: 290 K/UL (ref 150–450)
PMV BLD AUTO: 11.8 FL (ref 9.2–12.9)
RBC # BLD AUTO: 3.88 M/UL (ref 4–5.4)
RELATIVE EOSINOPHIL (OHS): 4.1 %
RELATIVE LYMPHOCYTE (OHS): 37.2 % (ref 18–48)
RELATIVE MONOCYTE (OHS): 8.2 % (ref 4–15)
RELATIVE NEUTROPHIL (OHS): 49.5 % (ref 38–73)
WBC # BLD AUTO: 10.31 K/UL (ref 3.9–12.7)

## 2025-05-27 PROCEDURE — 81339 MPL GENE SEQ ALYS EXON 10: CPT

## 2025-05-27 PROCEDURE — 85025 COMPLETE CBC W/AUTO DIFF WBC: CPT

## 2025-05-27 PROCEDURE — 81219 CALR GENE COM VARIANTS: CPT

## 2025-05-27 PROCEDURE — 81270 JAK2 GENE: CPT

## 2025-05-27 PROCEDURE — 36415 COLL VENOUS BLD VENIPUNCTURE: CPT

## 2025-05-27 RX ORDER — IBUPROFEN 600 MG/1
600 TABLET, FILM COATED ORAL EVERY 6 HOURS PRN
Qty: 90 TABLET | Refills: 0 | Status: SHIPPED | OUTPATIENT
Start: 2025-05-27

## 2025-05-31 ENCOUNTER — HOSPITAL ENCOUNTER (OUTPATIENT)
Dept: RADIOLOGY | Facility: HOSPITAL | Age: 40
Discharge: HOME | End: 2025-05-31
Attending: FAMILY MEDICINE
Payer: COMMERCIAL

## 2025-05-31 VITALS — HEIGHT: 69 IN | WEIGHT: 173 LBS | BODY MASS INDEX: 25.62 KG/M2

## 2025-05-31 DIAGNOSIS — Z12.31 OTHER SCREENING MAMMOGRAM: ICD-10-CM

## 2025-05-31 PROCEDURE — 77067 SCR MAMMO BI INCL CAD: CPT | Mod: 26,,, | Performed by: RADIOLOGY

## 2025-05-31 PROCEDURE — 77067 SCR MAMMO BI INCL CAD: CPT | Mod: TC

## 2025-05-31 PROCEDURE — 77063 BREAST TOMOSYNTHESIS BI: CPT | Mod: 26,,, | Performed by: RADIOLOGY

## 2025-06-05 ENCOUNTER — CLINICAL SUPPORT (OUTPATIENT)
Dept: PRIMARY CARE CLINIC | Facility: CLINIC | Age: 40
End: 2025-06-05
Payer: COMMERCIAL

## 2025-06-05 DIAGNOSIS — Z92.29 IMMUNIZATIONS UP TO DATE: Primary | ICD-10-CM

## 2025-06-05 LAB
M MPNR RESULT: NORMAL
PATH REPORT.FINAL DX SPEC: NORMAL

## 2025-06-05 PROCEDURE — 90734 MENACWYD/MENACWYCRM VACC IM: CPT | Mod: S$GLB,,, | Performed by: STUDENT IN AN ORGANIZED HEALTH CARE EDUCATION/TRAINING PROGRAM

## 2025-06-05 PROCEDURE — 90472 IMMUNIZATION ADMIN EACH ADD: CPT | Mod: S$GLB,,, | Performed by: STUDENT IN AN ORGANIZED HEALTH CARE EDUCATION/TRAINING PROGRAM

## 2025-06-05 PROCEDURE — G0009 ADMIN PNEUMOCOCCAL VACCINE: HCPCS | Mod: S$GLB,,, | Performed by: STUDENT IN AN ORGANIZED HEALTH CARE EDUCATION/TRAINING PROGRAM

## 2025-06-05 PROCEDURE — 99999 PR PBB SHADOW E&M-EST. PATIENT-LVL I: CPT | Mod: PBBFAC,,,

## 2025-06-05 PROCEDURE — 90677 PCV20 VACCINE IM: CPT | Mod: S$GLB,,, | Performed by: STUDENT IN AN ORGANIZED HEALTH CARE EDUCATION/TRAINING PROGRAM

## 2025-06-12 ENCOUNTER — CLINICAL SUPPORT (OUTPATIENT)
Dept: AUDIOLOGY | Facility: CLINIC | Age: 40
End: 2025-06-12
Payer: COMMERCIAL

## 2025-06-12 ENCOUNTER — OFFICE VISIT (OUTPATIENT)
Dept: OTOLARYNGOLOGY | Facility: CLINIC | Age: 40
End: 2025-06-12
Payer: COMMERCIAL

## 2025-06-12 DIAGNOSIS — R42 DIZZINESS AND GIDDINESS: ICD-10-CM

## 2025-06-12 DIAGNOSIS — G43.809 VESTIBULAR MIGRAINE: Primary | ICD-10-CM

## 2025-06-12 DIAGNOSIS — R42 RECURRENT VERTIGO: ICD-10-CM

## 2025-06-12 DIAGNOSIS — H91.93 HIGH FREQUENCY HEARING LOSS, BILATERAL: Primary | ICD-10-CM

## 2025-06-12 DIAGNOSIS — J31.0 CHRONIC RHINITIS: ICD-10-CM

## 2025-06-12 DIAGNOSIS — H93.13 TINNITUS, BILATERAL: ICD-10-CM

## 2025-06-12 PROCEDURE — 92557 COMPREHENSIVE HEARING TEST: CPT | Mod: S$GLB,,, | Performed by: AUDIOLOGIST

## 2025-06-12 PROCEDURE — 92567 TYMPANOMETRY: CPT | Mod: S$GLB,,, | Performed by: AUDIOLOGIST

## 2025-06-12 PROCEDURE — 99999 PR PBB SHADOW E&M-EST. PATIENT-LVL I: CPT | Mod: PBBFAC,,, | Performed by: AUDIOLOGIST

## 2025-06-12 PROCEDURE — 99999 PR PBB SHADOW E&M-EST. PATIENT-LVL I: CPT | Mod: PBBFAC,,, | Performed by: OTOLARYNGOLOGY

## 2025-06-12 RX ORDER — NORTRIPTYLINE HYDROCHLORIDE 10 MG/1
10 CAPSULE ORAL NIGHTLY
Qty: 30 CAPSULE | Refills: 11 | Status: SHIPPED | OUTPATIENT
Start: 2025-06-12 | End: 2026-06-12

## 2025-06-12 RX ORDER — MOMETASONE FUROATE MONOHYDRATE 50 UG/1
2 SPRAY, METERED NASAL DAILY
Qty: 17 G | Refills: 2 | Status: SHIPPED | OUTPATIENT
Start: 2025-06-12 | End: 2025-06-12

## 2025-06-12 NOTE — PROGRESS NOTES
Ear, Nose, & Throat  Otolaryngology - Head & Neck Surgery    Summary of Visit:  Gautam Ramirez was self-referred for ear discomfort/dizziness      Subjective:     Chief Complaint: left ear discomfort/dizziness      Gautam Ramirez is a 40 y.o. female who was self-referred for multiple concerns. She states she has recurrent ear infections 3-4 per year with a history of ETD. During episodes she experiences ear pain and some dizziness, resolved after taking antibiotics only--no PO steroids. She does not experience pain while flying. In between these episodes she is with some baseline ear discomfort. She reports allergy symptoms such as nasal congestion worse in Spring and Fall. She takes an antihistamine qhs and occasionally uses flonase and saline spray. She is not consistent with flonase as this causes epistaxis. She feels she has had some gradual hearing changes. Of note, she has a history of migraines and takes Fioricet as needed for exacerbations. She reports occasional headache mainly to temporal regions, no pain with chewing. She has episodes of dizziness described as room spinning and lasting about 30 seconds not accompanied by hearing changes or tinnitus. During the most recent episode she was laying in bed.     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 04/25/2017    Thyroid nodule 09/18/2017    Panic attack due to exceptional stress 09/03/2019    Hx of appendectomy 01/05/2006    Wears contact lenses 04/11/2025     Resolved Ambulatory Problems     Diagnosis Date Noted    Leukocytosis 03/29/2017    HSV-2 infection complicating pregnancy 09/18/2017    Prenatal care, first pregnancy 09/18/2017     Sickle cell trait - partner negative 10/16/2017    Premature rupture of membranes 2018     (spontaneous vaginal delivery) 2018     Past Medical History:   Diagnosis Date    Abnormal Pap smear of cervix ,     Allergic rhinitis     Anemia     Gallstone     Genital herpes     Hemoglobin C disease     Sickle cell disease, type SC        Past Surgical History  She has a past surgical history that includes Appendectomy; Splenectomy, total; Foot surgery (Left); and [place] tooth extraction.    Past Surgical History:   Procedure Laterality Date    APPENDECTOMY      FOOT SURGERY Left     SPLENECTOMY, TOTAL      WISDOM TOOTH EXTRACTION          Family History  Her family history includes Cancer in her father and paternal grandfather; Diabetes in an other family member; Hypertension in her father; Ovarian cancer in an other family member.    Social History  She reports that she has been smoking cigarettes. She has a 2.5 pack-year smoking history. She has been exposed to tobacco smoke. She has never used smokeless tobacco. She reports current alcohol use of about 1.0 standard drink of alcohol per week. She reports that she does not use drugs.    Allergies  She is allergic to prednisone, doxycycline, and nickel.    Medications  She has a current medication list which includes the following prescription(s): azelastine, butalbital-acetaminophen-caffeine -40 mg, ferrous sulfate, folic acid, ibuprofen, ipratropium, levocetirizine, moxifloxacin, nortriptyline, promethazine, and valacyclovir.    ROS:  Pertinent positive and negative review of systems as noted in HPI.     Objective:     LMP 2025    General Appearance:   Awake, Alert and Oriented. NAD. Appropriate affect and appearance      Neuro:   Spontaneous eye opening, appropriate verbal responses, follows commands  Pupils equal, round & brisk. EOMI, no proptosis  Face is symmetric, HB I, non-edematous bilaterally  Vision grossly intact,  Hearing grossly intact     Head and Face:   skin is intact with no lesions noted.  Parotid and submandibular glands are symmetric and non-tender.      Ears:  Periauricular regions non-erythematous, non-fluctuanct non-tender  Pinna normal bilaterally, no skin lesions  EACs patent and without drainage bilaterally   Tympanic membranes are normal in appearance bilaterally.  No middle ear effusion noted bilaterally.    Nose:   External nose is symmetric, no skin lesions  Inferior turbinate hypertrophy, No polyps or rhinorrhea     OC/OP:  Oral mucosa moist     Neck:  Neck is symmetric, non-edematous, non-erythematous    Respiratory:  Normal work of breathing, no accessory muscle use, no stridor     Voice:  Normal vocal quality, volume and articulation    Mary Ann-Hallpike negative    Data Review:   LABS    IMAGING        AUDIO    Mild ascending SNHL to mild downsloping SNHL of right    Procedures:       Assessment:     1. Vestibular migraine    2. Chronic rhinitis    3. Recurrent vertigo        Plan:     I had a long discussion with the patient regarding her condition and the further workup and management options. She was instructed to use nasonex daily in place of flonase due to side effects. Trial of nortriptyline for presumed vestibular migraine--mary ann-hallpike negative today--, hold Fioricet. She will follow up in 6-8 weeks.     No orders of the defined types were placed in this encounter.     Medications Ordered This Encounter   Medications    nortriptyline (PAMELOR) 10 MG capsule      Problem List Items Addressed This Visit    None

## 2025-06-12 NOTE — PROGRESS NOTES
Audiologic Evaluation 6/12/2025:       Gautam Ramirez, a 40 y.o. female, was seen today in the clinic for an audiologic evaluation.  Ms. Ramirez reported hearing loss, tinnitus, intermittent otalgia, and dizziness. Her most recent episode of dizziness occurred while she was laying in bed and lasted about 30 seconds.     Tympanometry revealed Type A tympanogram in the right ear and Type As tympanogram in the left ear. Audiogram results revealed mild rising to normal and sloping to moderate sensorineural hearing loss in the right ear and essentially normal hearing with a mild sensorineural hearing loss at 8000 Hz in the left ear.  Speech reception thresholds were noted at 15 dB in the right ear and 5 dB in the left ear.  Speech discrimination scores were 100% in the right ear and 100% in the left ear.    Recommendations:  Otologic evaluation  Annual audiogram  Hearing protection when in noise

## 2025-06-16 ENCOUNTER — RESULTS FOLLOW-UP (OUTPATIENT)
Dept: PRIMARY CARE CLINIC | Facility: CLINIC | Age: 40
End: 2025-06-16

## 2025-06-23 NOTE — TELEPHONE ENCOUNTER
Called patient to schedule her echo. Patient stated she would call back in a little bit when she gets by a calendar.   complains of pain/discomfort

## 2025-06-26 ENCOUNTER — HOSPITAL ENCOUNTER (OUTPATIENT)
Dept: RADIOLOGY | Facility: HOSPITAL | Age: 40
Discharge: HOME | End: 2025-06-26
Attending: FAMILY MEDICINE
Payer: COMMERCIAL

## 2025-06-26 DIAGNOSIS — R92.8 ABNORMAL MAMMOGRAM: ICD-10-CM

## 2025-06-26 PROCEDURE — 76642 ULTRASOUND BREAST LIMITED: CPT | Mod: TC,RT

## 2025-06-26 PROCEDURE — 77061 BREAST TOMOSYNTHESIS UNI: CPT | Mod: 26,RT,, | Performed by: RADIOLOGY

## 2025-06-26 PROCEDURE — 77065 DX MAMMO INCL CAD UNI: CPT | Mod: 26,RT,, | Performed by: RADIOLOGY

## 2025-06-26 PROCEDURE — 77061 BREAST TOMOSYNTHESIS UNI: CPT | Mod: TC,RT

## 2025-06-26 PROCEDURE — 76642 ULTRASOUND BREAST LIMITED: CPT | Mod: 26,RT,, | Performed by: RADIOLOGY

## 2025-07-02 ENCOUNTER — RESULTS FOLLOW-UP (OUTPATIENT)
Dept: PRIMARY CARE CLINIC | Facility: CLINIC | Age: 40
End: 2025-07-02

## 2025-07-02 DIAGNOSIS — Z12.31 SCREENING MAMMOGRAM, ENCOUNTER FOR: Primary | ICD-10-CM

## 2025-07-24 ENCOUNTER — OFFICE VISIT (OUTPATIENT)
Dept: OTOLARYNGOLOGY | Facility: CLINIC | Age: 40
End: 2025-07-24
Payer: COMMERCIAL

## 2025-07-24 DIAGNOSIS — G43.809 VESTIBULAR MIGRAINE: ICD-10-CM

## 2025-07-24 DIAGNOSIS — H90.41 SENSORINEURAL HEARING LOSS (SNHL) OF RIGHT EAR WITH UNRESTRICTED HEARING OF LEFT EAR: Primary | ICD-10-CM

## 2025-07-24 DIAGNOSIS — R42 RECURRENT VERTIGO: ICD-10-CM

## 2025-07-24 PROCEDURE — 99214 OFFICE O/P EST MOD 30 MIN: CPT | Mod: S$GLB,,, | Performed by: OTOLARYNGOLOGY

## 2025-07-24 PROCEDURE — 1159F MED LIST DOCD IN RCRD: CPT | Mod: CPTII,S$GLB,, | Performed by: OTOLARYNGOLOGY

## 2025-07-24 PROCEDURE — 99999 PR PBB SHADOW E&M-EST. PATIENT-LVL III: CPT | Mod: PBBFAC,,, | Performed by: OTOLARYNGOLOGY

## 2025-07-24 PROCEDURE — 3044F HG A1C LEVEL LT 7.0%: CPT | Mod: CPTII,S$GLB,, | Performed by: OTOLARYNGOLOGY

## 2025-07-25 NOTE — PROGRESS NOTES
Ear, Nose, & Throat  Otolaryngology - Head & Neck Surgery    Summary of Visit:  Gautam Ramirez was seen in follow-up for Follow-up      Subjective:     Chief Complaint:   Chief Complaint   Patient presents with    Follow-up       Gautam Ramirez is a 40 y.o. female who returns to clinic for follow up.  She notes significant improvement in symptoms since last visit.  She has not experienced any headaches or dizziness since initiation of nortriptyline.  She is experiencing some fatigue and brain fog which she associates with the medication.    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 2017    Thyroid nodule 2017    Panic attack due to exceptional stress 2019    Hx of appendectomy 2006    Wears contact lenses 2025     Resolved Ambulatory Problems     Diagnosis Date Noted    Leukocytosis 2017    HSV-2 infection complicating pregnancy 2017    Prenatal care, first pregnancy 2017    Sickle cell trait - partner negative 10/16/2017    Premature rupture of membranes 2018     (spontaneous vaginal delivery) 2018     Past Medical History:   Diagnosis Date    Abnormal Pap smear of cervix ,     Allergic rhinitis     Anemia     Gallstone     Genital herpes     Hemoglobin C disease     Sickle cell disease, type SC        Past Surgical History  She has a past surgical history that includes Appendectomy; Splenectomy, total; Foot surgery (Left); and [place] tooth extraction.    Past Surgical History:   Procedure Laterality Date    APPENDECTOMY      FOOT SURGERY Left     SPLENECTOMY, TOTAL      WISDOM TOOTH EXTRACTION          Family  History  Her family history includes Cancer in her father and paternal grandfather; Diabetes in an other family member; Hypertension in her father; Ovarian cancer in an other family member.    Social History  She reports that she has been smoking cigarettes. She has a 2.5 pack-year smoking history. She has been exposed to tobacco smoke. She has never used smokeless tobacco. She reports current alcohol use of about 1.0 standard drink of alcohol per week. She reports that she does not use drugs.    Allergies  She is allergic to prednisone, doxycycline, and nickel.    Medications  She has a current medication list which includes the following prescription(s): azelastine, butalbital-acetaminophen-caffeine -40 mg, ferrous sulfate, folic acid, ibuprofen, ipratropium, levocetirizine, moxifloxacin, nortriptyline, promethazine, and valacyclovir.    ROS:  Pertinent positive and negative review of systems as noted in HPI.     Objective:     There were no vitals taken for this visit.   General Appearance:   Awake, Alert and Oriented. NAD. Appropriate affect and appearance      Neuro:   Spontaneous eye opening, appropriate verbal responses, follows commands  Pupils equal, round & brisk. EOMI, no proptosis,  Face is symmetric, HB I, non-edematous bilaterally       Head and Face:   Skin is intact with no lesions noted.  Parotid and submandibular glands are symmetric and non-tender.      Ears:  Periauricular regions non-erythematous, non-fluctuanct, and non-tender  Pinna normal bilaterally, no skin lesions  EACs patent and without drainage bilaterally   Tympanic membranes are normal in appearance bilaterally.  No middle ear effusion noted bilaterally.    Nose:   External nose is symmetric, no skin lesions  Septum midline, No inferior turbinate hypertrophy, No polyps or rhinorrhea     OC/OP:  Tongue midline on extension, non-edematous, soft  No labial, buccal, oral tongue or floor of mouth lesions  Soft palate symmetric,  midline and without lesions or masses, tonsils symmetric  No masses or lesions of the visualized oropharynx     Neck:  Neck is symmetric, non-edematous, non-erythematous  Trachea is midline and easily palpable,  No palpable adenopathy or masses in levels I-VI  No thyroid nodules or masses, non-tender      Respiratory:  Normal work of breathing, no accessory muscle use, no stridor     Voice:  Normal vocal quality, volume and articulation    Data Review:   LABS    IMAGING    AUDIO    Procedures:       Assessment:     1. Sensorineural hearing loss (SNHL) of right ear with unrestricted hearing of left ear    2. Vestibular migraine    3. Recurrent vertigo        Plan:     I had a long discussion with the patient regarding her condition and the further workup and management options.  She is experiencing significant symptomatic improvement with initiation of migraine prophylactic therapy.  However, she does have asymmetric hearing loss.  MRI of the IAC's is ordered to assess this further.  Follow up after this is performed.  She is encouraged to continue her nortriptyline as her mild sedation associated with in his likely to improve with some tolerance.    Orders Placed This Encounter   Procedures    MRI IAC/Temporal Bones W W/O Contrast    Basic Metabolic Panel          Problem List Items Addressed This Visit    None  Visit Diagnoses         Sensorineural hearing loss (SNHL) of right ear with unrestricted hearing of left ear    -  Primary    Relevant Orders    MRI IAC/Temporal Bones W W/O Contrast    Basic Metabolic Panel

## 2025-08-25 DIAGNOSIS — M25.569 KNEE PAIN, UNSPECIFIED CHRONICITY, UNSPECIFIED LATERALITY: ICD-10-CM

## 2025-08-26 RX ORDER — IBUPROFEN 600 MG/1
600 TABLET, FILM COATED ORAL EVERY 6 HOURS PRN
Qty: 90 TABLET | Refills: 0 | Status: SHIPPED | OUTPATIENT
Start: 2025-08-26